# Patient Record
Sex: MALE | Race: WHITE | NOT HISPANIC OR LATINO | Employment: OTHER | URBAN - METROPOLITAN AREA
[De-identification: names, ages, dates, MRNs, and addresses within clinical notes are randomized per-mention and may not be internally consistent; named-entity substitution may affect disease eponyms.]

---

## 2022-03-21 PROBLEM — E78.1 HYPERGLYCERIDEMIA, PURE: Status: ACTIVE | Noted: 2022-03-21

## 2022-03-21 PROBLEM — R73.01 IMPAIRED FASTING GLUCOSE: Status: ACTIVE | Noted: 2022-03-21

## 2022-03-21 NOTE — PROGRESS NOTES
FAMILY MEDICINE NEW PATIENT     Date of Service: 22  Primary Care Provider:   Joseph Gannon MD     Name: Yue Villafuerte       : 1965       Age:56 y o  Sex: male      MRN: [id number]      Chief [address] and Back Pain     ASSESSMENT and PLAN:  Yue Villafuerte is a 55 y o  male here to establish care with:     Problem List Items Addressed This Visit        Endocrine    Impaired fasting glucose     Counseled patient on the importance of lifestyle interventions, specifically discussed a whole food, plant based diet low in saturated fat and processed foods  Discussed the importance of a diet that is rich in whole grains, fruits and vegetables, beans and legumes  Other    Hyperglyceridemia, pure - Primary     Last >200, has had TGs as high as high as >600  He has been on fibrate in the past, but had adverse reaction  Counseled on dietary modifications, specifically avoiding processed/refined carbohydrates, alcohol  Patient to send previously done labs for life insurance         Chronic right-sided low back pain without sciatica     Back pain MSK in nature given that symptoms worse at the end of the day after repetitive motions with job as   Would not expect kidney stone to have such insidious symptoms that come and go  No significant findings of disc herniations or radicular symptoms on exam, and no tenderness to palpation  Patient requesting CT scan to evaluate internal organs, though given nature of his back pain, and no other symptoms (no GI,  symptoms, ect) there is no indication for this type of imaging  Can obtain lumbar spine xray to assess disc space height, degenerative changes  Had extensive discussion regarding mainstay of therapy for chronic back pain, especially given poor hip and hamstring flexibility            Relevant Orders    XR spine lumbar minimum 4 views non injury      Other Visit Diagnoses     Encounter to establish care with new doctor        Class 1 obesity due to excess calories without serious comorbidity with body mass index (BMI) of 30 0 to 30 9 in adult               SUBJECTIVE:  Ana Combs is a 55 y o  male who presents today with a chief complaint of Establish Care and Back Pain  HPI     Patient presents to [facility]  He was previously seen at [address] in [facility] and previously in [facility]  He was last seen in March 2020  Overall his health is "not bad " His main complaint today is back pain  He reports he has cramping sensation, dull right sided back and flank pain  This started about a year ago as a dull pain, but the pain has progressed to having some cramping/spasms at night  He denies any injury or trauma  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  He cannot wear belts due to back pain  He has remote back injury in his 25s  He denies any lower extremity numbness, tingling, weakness in his legs  He started taking an antiinflammatory supplement  He sees a chiropractor on a regular basis  He has history of familial hyperglyceridemia, he has intolerance to fibrate  He reports it caused him to say inappropriate things  His brother is on atorvastatin  He does not eat the healthiest diet  He does eat a lot meat, red meat twice a week  He does not eat whole grains  He has one serving of fruit daily  He does want to eat healthier  He is not on any medications  He does have allergies to certain foods, has had rashes to certain chocolate, sensation of throat closing when eating certain chicken  He has to avoid brown sugar  Review of Systems   Gastrointestinal: Negative for abdominal pain, blood in stool, constipation, diarrhea and nausea  Musculoskeletal: Positive for back pain  Negative for gait problem  Neurological: Negative for weakness and numbness  Psychiatric/Behavioral: Negative for sleep disturbance       I have reviewed the patient's PMH, Surgical History, Family History, Social History, Medication List and Allergies  Histories Reviewed and Updated 3/22/2022:  Patient's Medications   New Prescriptions    No medications on file   Previous Medications    NON FORMULARY    Take 1 Dose by mouth in the morning RELIEF FACTOR PACK- ANTI-INFLAMATORY   Modified Medications    No medications on file   Discontinued Medications    No medications on file     Allergies   Allergen Reactions    Turmeric - Food Allergy Anaphylaxis    Niacin Other (See Comments)     Other reaction(s): NIACIN (NIACIN) (facial flushing)    Fenofibrate Dizziness     Other reaction(s): dizziness     Past Medical History:   Diagnosis Date    Back ache     Hemorrhoids     Hypertension     Kidney stone      Past Surgical History:   Procedure Laterality Date    NO PAST SURGERIES       Social History     Socioeconomic History    Marital status: Unknown     Spouse name: Not on file    Number of children: Not on file    Years of education: Not on file    Highest education level: Not on file   Occupational History    Not on file   Tobacco Use    Smoking status: Never Smoker    Smokeless tobacco: Never Used   Substance and Sexual Activity    Alcohol use:  Yes     Alcohol/week: 3 0 standard drinks     Types: 3 Cans of beer per week    Drug use: Never    Sexual activity: Not on file   Other Topics Concern    Not on file   Social History Narrative    Not on file     Social Determinants of Health     Financial Resource Strain: Not on file   Food Insecurity: Not on file   Transportation Needs: Not on file   Physical Activity: Not on file   Stress: Not on file   Social Connections: Not on file   Intimate Partner Violence: Not on file   Housing Stability: Not on file     Family History   Problem Relation Age of Onset    Diabetes Mother     No Known Problems Father     Diabetes Sister     Diabetic kidney disease Sister     Stroke Sister     Hyperlipidemia Brother      Immunization History   Administered Date(s) Administered    COVID-19 PFIZER VACCINE 0 3 ML IM 06/03/2021, 06/25/2021    INFLUENZA 11/01/2009, 11/11/2010    Tdap 02/04/2010     OBJECTIVE:  /68   Pulse 64   Temp (!) 96 2 °F (35 7 °C)   Resp 16   Ht 5' 7 5" (1 715 m)   Wt 89 8 kg (198 lb)   BMI 30 55 kg/m²   BP Readings from Last 3 Encounters:   03/22/22 110/68      Wt Readings from Last 3 Encounters:   03/22/22 89 8 kg (198 lb)      Physical Exam  Constitutional:       General: He is not in acute distress  Appearance: Normal appearance  He is obese  He is not ill-appearing or diaphoretic  HENT:      Head: Normocephalic and atraumatic  Right Ear: External ear normal       Left Ear: External ear normal       Nose: Nose normal       Mouth/Throat:      Mouth: Mucous membranes are moist    Eyes:      Extraocular Movements: Extraocular movements intact  Conjunctiva/sclera: Conjunctivae normal    Cardiovascular:      Rate and Rhythm: Normal rate and regular rhythm  Pulses: Normal pulses  Heart sounds: Normal heart sounds  No murmur heard  No friction rub  No gallop  Pulmonary:      Effort: Pulmonary effort is normal  No respiratory distress  Breath sounds: Normal breath sounds  No stridor  No wheezing, rhonchi or rales  Abdominal:      General: There is no distension  Palpations: Abdomen is soft  Tenderness: There is no abdominal tenderness  There is no guarding or rebound  Musculoskeletal:      Cervical back: Normal range of motion and neck supple  Lumbar back: No swelling, spasms, tenderness or bony tenderness  Normal range of motion  Negative right straight leg raise test and negative left straight leg raise test  No scoliosis  Right hip: Decreased range of motion  Left hip: Decreased range of motion  Right lower leg: No edema  Left lower leg: No edema        Comments: Decreased hip range of motion due to poor flexibility in bilateral hips, poor hamstring flexibility Skin:     General: Skin is warm and dry  Findings: No erythema or rash  Neurological:      General: No focal deficit present  Mental Status: He is alert  Gait: Gait normal    Psychiatric:         Mood and Affect: Mood normal          Behavior: Behavior normal              BMI Counseling: Body mass index is 30 55 kg/m²  The BMI is above normal  Nutrition recommendations include decreasing portion sizes, encouraging healthy choices of fruits and vegetables, consuming healthier snacks and reducing intake of saturated and trans fat  Exercise recommendations include moderate physical activity 150 minutes/week and strength training exercises  Rationale for BMI follow-up plan is due to patient being overweight or obese  Depression Screening and Follow-up Plan: Patient was screened for depression during today's encounter  They screened negative with a PHQ-2 score of 0          Abad Corea MD

## 2022-03-22 ENCOUNTER — OFFICE VISIT (OUTPATIENT)
Dept: FAMILY MEDICINE CLINIC | Facility: CLINIC | Age: 57
End: 2022-03-22
Payer: COMMERCIAL

## 2022-03-22 VITALS
WEIGHT: 198 LBS | SYSTOLIC BLOOD PRESSURE: 110 MMHG | TEMPERATURE: 96.2 F | HEIGHT: 68 IN | RESPIRATION RATE: 16 BRPM | BODY MASS INDEX: 30.01 KG/M2 | DIASTOLIC BLOOD PRESSURE: 68 MMHG | HEART RATE: 64 BPM

## 2022-03-22 DIAGNOSIS — Z76.89 ENCOUNTER TO ESTABLISH CARE WITH NEW DOCTOR: ICD-10-CM

## 2022-03-22 DIAGNOSIS — R73.01 IMPAIRED FASTING GLUCOSE: ICD-10-CM

## 2022-03-22 DIAGNOSIS — M54.50 CHRONIC RIGHT-SIDED LOW BACK PAIN WITHOUT SCIATICA: ICD-10-CM

## 2022-03-22 DIAGNOSIS — E78.1 HYPERGLYCERIDEMIA, PURE: Primary | ICD-10-CM

## 2022-03-22 DIAGNOSIS — G89.29 CHRONIC RIGHT-SIDED LOW BACK PAIN WITHOUT SCIATICA: ICD-10-CM

## 2022-03-22 DIAGNOSIS — E66.09 CLASS 1 OBESITY DUE TO EXCESS CALORIES WITHOUT SERIOUS COMORBIDITY WITH BODY MASS INDEX (BMI) OF 30.0 TO 30.9 IN ADULT: ICD-10-CM

## 2022-03-22 PROCEDURE — 3725F SCREEN DEPRESSION PERFORMED: CPT | Performed by: FAMILY MEDICINE

## 2022-03-22 PROCEDURE — 99204 OFFICE O/P NEW MOD 45 MIN: CPT | Performed by: FAMILY MEDICINE

## 2022-03-22 NOTE — PATIENT INSTRUCTIONS
Jaron Method for progressive back extension     Lower Back Exercises   AMBULATORY CARE:   Lower back exercises  help heal and strengthen your back muscles to prevent another injury  Ask your healthcare provider if you need to see a physical therapist for more advanced exercises  Seek care immediately if:   · You have severe pain that prevents you from moving  Contact your healthcare provider if:   · Your pain becomes worse  · You have new pain  · You have questions or concerns about your condition or care  Do lower back exercises safely:   · Do the exercises on a mat or firm surface  (not on a bed) to support your spine and prevent low back pain  · Move slowly and smoothly  Avoid fast or jerky motions  · Breathe normally  Do not hold your breath  · Stop if you feel pain  It is normal to feel some discomfort at first  Regular exercise will help decrease your discomfort over time  Lower back exercises: Your healthcare provider may recommend that you do back exercises 10 to 30 minutes each day  He may also recommend that you do exercises 1 to 3 times each day  Ask your healthcare provider which exercises are best for you and how often to do them  · Ankle pumps:  Lie on your back  Move your foot up (with your toes pointing toward your head)  Then, move your foot down (with your toes pointing away from you)  Repeat this exercise 10 times on each side  · Heel slides:  Lie on your back  Slowly bend one leg and then straighten it  Next, bend the other leg and then straighten it  Repeat 10 times on each side  · Pelvic tilt:  Lie on your back with your knees bent and feet flat on the floor  Place your arms in a relaxed position beside your body  Tighten the muscles of your abdomen and flatten your back against the floor  Hold for 5 seconds  Repeat 5 times  · Back stretch:  Lie on your back with your hands behind your head   Bend your knees and turn the lower half of your body to one side  Hold this position for 10 seconds  Repeat 3 times on each side  · Straight leg raises:  Lie on your back with one leg straight  Bend the other knee  Tighten your abdomen and then slowly lift the straight leg up about 6 to 12 inches off the floor  Hold for 1 to 5 seconds  Lower your leg slowly  Repeat 10 times on each leg  · Knee-to-chest:  Lie on your back with your knees bent and feet flat on the floor  Pull one of your knees toward your chest and hold it there for 5 seconds  Return your leg to the starting position  Lift the other knee toward your chest and hold for 5 seconds  Do this 5 times on each side  · Cat and camel:  Place your hands and knees on the floor  Arch your back upward toward the ceiling and lower your head  Round out your spine as much as you can  Hold for 5 seconds  Lift your head upward and push your chest downward toward the floor  Hold for 5 seconds  Do 3 sets or as directed  · Wall squats:  Stand with your back against a wall  Tighten the muscles of your abdomen  Slowly lower your body until your knees are bent at a 45 degree angle  Hold this position for 5 seconds  Slowly move back up to a standing position  Repeat 10 times  · Curl up:  Lie on your back with your knees bent and feet flat on the floor  Place your hands, palms down, underneath the curve in your lower back  Next, with your elbows on the floor, lift your shoulders and chest 2 to 3 inches  Keep your head in line with your shoulders  Hold this position for 5 seconds  When you can do this exercise without pain for 10 to 15 seconds, you may add a rotation  While your shoulders and chest are lifted off the ground, turn slightly to the left and hold  Repeat on the other side  · Bird dog:  Place your hands and knees on the floor  Keep your wrists directly below your shoulders and your knees directly below your hips  Pull your belly button in toward your spine  Do not flatten or arch your back  Tighten your abdominal muscles  Raise one arm straight out so that it is aligned with your head  Next, raise the leg opposite your arm  Hold this position for 15 seconds  Lower your arm and leg slowly and change sides  Do 5 sets  © Copyright [employer] 2022 Information is for End User's use only and may not be sold, redistributed or otherwise used for commercial purposes  All illustrations and images included in CareNotes® are the copyrighted property of [employer]  or Isaac Goodson   The above information is an  only  It is not intended as medical advice for individual conditions or treatments  Talk to your doctor, nurse or pharmacist before following any medical regimen to see if it is safe and effective for you

## 2022-03-22 NOTE — ASSESSMENT & PLAN NOTE
Last >200, has had TGs as high as high as >600  He has been on fibrate in the past, but had adverse reaction  Counseled on dietary modifications, specifically avoiding processed/refined carbohydrates, alcohol  Patient to send previously done labs for life insurance

## 2022-03-23 ENCOUNTER — TELEPHONE (OUTPATIENT)
Dept: INTERNAL MEDICINE CLINIC | Facility: CLINIC | Age: 57
End: 2022-03-23

## 2022-03-23 NOTE — ASSESSMENT & PLAN NOTE
Back pain MSK in nature given that symptoms worse at the end of the day after repetitive motions with job as   Would not expect kidney stone to have such insidious symptoms that come and go  No significant findings of disc herniations or radicular symptoms on exam, and no tenderness to palpation  Patient requesting CT scan to evaluate internal organs, though given nature of his back pain, and no other symptoms (no GI,  symptoms, ect) there is no indication for this type of imaging  Can obtain lumbar spine xray to assess disc space height, degenerative changes  Had extensive discussion regarding mainstay of therapy for chronic back pain, especially given poor hip and hamstring flexibility

## 2022-03-23 NOTE — TELEPHONE ENCOUNTER
Stacie Moon had left a voice mail to the  Office requesting a call back  Attempted to call Sj Pratt to call back

## 2022-03-24 ENCOUNTER — OFFICE VISIT (OUTPATIENT)
Dept: INTERNAL MEDICINE CLINIC | Facility: CLINIC | Age: 57
End: 2022-03-24
Payer: COMMERCIAL

## 2022-03-24 VITALS
DIASTOLIC BLOOD PRESSURE: 90 MMHG | BODY MASS INDEX: 29.98 KG/M2 | HEIGHT: 68 IN | WEIGHT: 197.8 LBS | TEMPERATURE: 97.8 F | SYSTOLIC BLOOD PRESSURE: 142 MMHG | OXYGEN SATURATION: 97 % | HEART RATE: 57 BPM

## 2022-03-24 DIAGNOSIS — R10.9 RIGHT FLANK PAIN: Primary | ICD-10-CM

## 2022-03-24 DIAGNOSIS — R79.89 ELEVATED SERUM CREATININE: ICD-10-CM

## 2022-03-24 PROBLEM — N28.1 BILATERAL RENAL CYSTS: Status: ACTIVE | Noted: 2020-02-18

## 2022-03-24 PROBLEM — K64.1 GRADE II HEMORRHOIDS: Status: ACTIVE | Noted: 2018-06-28

## 2022-03-24 PROCEDURE — 1036F TOBACCO NON-USER: CPT | Performed by: INTERNAL MEDICINE

## 2022-03-24 PROCEDURE — 3008F BODY MASS INDEX DOCD: CPT | Performed by: INTERNAL MEDICINE

## 2022-03-24 PROCEDURE — 99204 OFFICE O/P NEW MOD 45 MIN: CPT | Performed by: INTERNAL MEDICINE

## 2022-03-24 RX ORDER — MULTIVIT-MIN/IRON FUM/FOLIC AC 7.5 MG-4
1 TABLET ORAL DAILY
COMMUNITY

## 2022-03-24 NOTE — ASSESSMENT & PLAN NOTE
Questionable history of bilateral kidney cysts, he did have kidney stones in the past   No family history of kidney disease other than complications from longstanding diabetes  Last blood work in the system from 2 years ago shows elevated creatinine and low GFR of 58  Ordered CT scan of the abdomen wo and UA  He had blood work done 1 month ago, but does not have copies with him, we will review and re-evaluate the need for further tests

## 2022-03-24 NOTE — ASSESSMENT & PLAN NOTE
Blood pressure in office today was 142/90  And prior office visit 2 days ago his blood pressure was 110/80  We will monitor

## 2022-03-24 NOTE — PROGRESS NOTES
INTERNAL MEDICINE INITIAL OFFICE VISIT  [facility] INTERNAL MEDICINE NURYS    NAME: Pablo Person  AGE: 55 y o  SEX: male  : 1965     DATE: 3/24/2022     Assessment and Plan:     Right flank pain  Questionable history of bilateral kidney cysts, he did have kidney stones in the past   No family history of kidney disease other than complications from longstanding diabetes  Last blood work in the system from 2 years ago shows elevated creatinine and low GFR of 58  Ordered CT scan of the abdomen wo and UA  He had blood work done 1 month ago, but does not have copies with him, we will review and re-evaluate the need for further tests  Bilateral renal cysts  Ordered CT scan for further evaluation  Elevated serum creatinine  Patient will bring copies of his most recent blood work, oriented to avoid NSAIDs for now  Elevated blood-pressure reading without diagnosis of hypertension  Blood pressure in office today was 142/90  And prior office visit 2 days ago his blood pressure was 110/80  We will monitor  Return in about 3 months (around 2022) for Recheck  Chief Complaint:     Chief Complaint   Patient presents with    Follow-up     establish care, ED, lower right back pain      History of Present Illness:     Patient is a 50-year-old male with past medical history of hypertriglyceridemia, elevated creatinine, questionable bilateral kidney cysts, history of kidney stones, who presents in office to establish care with us as a primary care physician  Patient was recently seen by family med, but states that his issue was not addressed as he expected  Patient states that he has been dealing with right flank pain for about 1 year  The pain has been progressive bothersome and worsening, the pain is exacerbated by certain movements specially when he sits down  The pain is alleviated by him lying down on his left side    He also takes Advil as needed for the pain  The pain is mild most of the time, with episodes of severe cramp pain  The pain does not radiate, is not associated with nausea, vomiting, urinary or bowel changes  Patient states that he had back problem in the past, and he does not feel like the pain comes from a back issue  Review of his prior consultations and blood work revealed elevated creatinine of 1 38, with a GFR of 58 and 2020, he also had elevated fasting glucose of 110, and elevated triglycerides of 244  Patient was seen by internal medicine in the past, questionable bilateral kidney cysts, he was referred by nephrologist, he does not recall seeing a specialist for his or being told of this diagnosis  Patient states that he have a colonoscopy about 5 years ago, he has a history of colon polyps and hemorrhoids which were taking care at that time  Patient have blood work done about 1 month ago, but could not bring the copies for review today  The following portions of the patient's history were reviewed and updated as appropriate: allergies, current medications, past family history, past medical history, past social history, past surgical history and problem list      Review of Systems:     Review of Systems   Constitutional: Negative for appetite change, fatigue and fever  HENT: Negative for congestion, sore throat and trouble swallowing  Eyes: Negative for visual disturbance  Respiratory: Negative for cough, chest tightness, shortness of breath and wheezing  Cardiovascular: Negative for chest pain, palpitations and leg swelling  Gastrointestinal: Positive for abdominal pain (right flank)  Negative for constipation, nausea and vomiting  Genitourinary: Negative for difficulty urinating, frequency, hematuria and urgency  Musculoskeletal: Positive for back pain  Negative for arthralgias and joint swelling  Skin: Negative for rash  Neurological: Negative for dizziness and headaches     Psychiatric/Behavioral: Negative for confusion and sleep disturbance  Past Medical History:     Past Medical History:   Diagnosis Date    Back ache     Hemorrhoids     Hypertension     Kidney stone     Kidney stones         Past Surgical History:     Past Surgical History:   Procedure Laterality Date    NO PAST SURGERIES          Social History:   He reports that he has never smoked  He has never used smokeless tobacco  He reports current alcohol use of about 3 0 standard drinks of alcohol per week  He reports that he does not use drugs  Family History:     Family History   Problem Relation Age of Onset    Diabetes Mother     No Known Problems Father     Diabetes Sister     Diabetic kidney disease Sister     Stroke Sister     Hyperlipidemia Brother         Current Medications:     Current Outpatient Medications:     Multiple Vitamins-Minerals (multivitamin with minerals) tablet, Take 1 tablet by mouth daily, Disp: , Rfl:     NON FORMULARY, Take 1 Dose by mouth in the morning RELIEF FACTOR PACK- ANTI-INFLAMATORY, Disp: , Rfl:      Allergies: Allergies   Allergen Reactions    Turmeric - Food Allergy Anaphylaxis    Niacin Other (See Comments)     Other reaction(s): NIACIN (NIACIN) (facial flushing)    Fenofibrate Dizziness     Other reaction(s): dizziness        Physical Exam:     /90 (BP Location: Left arm, Patient Position: Sitting, Cuff Size: Standard)   Pulse 57   Temp 97 8 °F (36 6 °C) (Tympanic)   Ht 5' 7 5" (1 715 m)   Wt 89 7 kg (197 lb 12 8 oz)   SpO2 97%   BMI 30 52 kg/m²     Physical Exam  Vitals and nursing note reviewed  Constitutional:       General: He is not in acute distress  Appearance: He is well-developed  He is obese  HENT:      Head: Normocephalic and atraumatic  Eyes:      Conjunctiva/sclera: Conjunctivae normal       Pupils: Pupils are equal, round, and reactive to light  Neck:      Thyroid: No thyromegaly     Cardiovascular:      Rate and Rhythm: Normal rate and regular rhythm  Heart sounds: Normal heart sounds  Pulmonary:      Effort: No respiratory distress  Breath sounds: Normal breath sounds  No wheezing  Abdominal:      General: Bowel sounds are normal  There is no distension  Palpations: Abdomen is soft  There is no mass  Tenderness: There is no abdominal tenderness  There is no right CVA tenderness  Musculoskeletal:         General: No swelling  Normal range of motion  Cervical back: Normal range of motion and neck supple  Skin:     General: Skin is warm and dry  Capillary Refill: Capillary refill takes less than 2 seconds  Coloration: Skin is not pale  Findings: No erythema  Neurological:      Mental Status: He is alert and oriented to person, place, and time  Sensory: No sensory deficit  Motor: No weakness or abnormal muscle tone  Psychiatric:         Thought Content: Thought content normal          Judgment: Judgment normal           Data:     Laboratory Results: I have personally reviewed the pertinent laboratory results/reports   Radiology/Other Diagnostic Testing Results: I have personally reviewed pertinent reports        Sourav Anderson MD  [facility] INTERNAL MEDICINE [place]

## 2022-04-22 ENCOUNTER — HOSPITAL ENCOUNTER (OUTPATIENT)
Dept: CT IMAGING | Facility: HOSPITAL | Age: 57
Discharge: HOME | End: 2022-04-22
Attending: INTERNAL MEDICINE
Payer: COMMERCIAL

## 2022-04-22 DIAGNOSIS — R10.9 RIGHT FLANK PAIN: ICD-10-CM

## 2022-04-22 PROCEDURE — 74176 CT ABD & PELVIS W/O CONTRAST: CPT

## 2022-04-22 PROCEDURE — G1004 CDSM NDSC: HCPCS

## 2022-05-02 DIAGNOSIS — R73.01 IMPAIRED FASTING GLUCOSE: Primary | ICD-10-CM

## 2022-05-02 DIAGNOSIS — E78.1 HYPERGLYCERIDEMIA, PURE: ICD-10-CM

## 2022-05-02 DIAGNOSIS — R79.89 ELEVATED SERUM CREATININE: ICD-10-CM

## 2022-05-02 NOTE — PROGRESS NOTES
Called patient to inform results of CT scan, which showed bilateral kidney cysts, simple and right-sided 3 mm kidney stone  Instructed patient to increase oral intake  He was not able to have access to his most recent blood work, will repeat

## 2022-05-24 ENCOUNTER — APPOINTMENT (OUTPATIENT)
Dept: LAB | Facility: CLINIC | Age: 57
End: 2022-05-24
Payer: COMMERCIAL

## 2022-05-24 DIAGNOSIS — R79.89 ELEVATED SERUM CREATININE: ICD-10-CM

## 2022-05-24 DIAGNOSIS — R73.01 IMPAIRED FASTING GLUCOSE: ICD-10-CM

## 2022-05-24 DIAGNOSIS — E78.1 HYPERGLYCERIDEMIA, PURE: ICD-10-CM

## 2022-05-24 LAB
ALBUMIN SERPL BCP-MCNC: 3.9 G/DL (ref 3.5–5)
ALP SERPL-CCNC: 102 U/L (ref 46–116)
ALT SERPL W P-5'-P-CCNC: 41 U/L (ref 12–78)
ANION GAP SERPL CALCULATED.3IONS-SCNC: 2 MMOL/L (ref 4–13)
AST SERPL W P-5'-P-CCNC: 27 U/L (ref 5–45)
BACTERIA UR QL AUTO: NORMAL /HPF
BASOPHILS # BLD AUTO: 0.05 THOUSANDS/ΜL (ref 0–0.1)
BASOPHILS NFR BLD AUTO: 1 % (ref 0–1)
BILIRUB SERPL-MCNC: 1.26 MG/DL (ref 0.2–1)
BILIRUB UR QL STRIP: NEGATIVE
BUN SERPL-MCNC: 19 MG/DL (ref 5–25)
CALCIUM SERPL-MCNC: 9.4 MG/DL (ref 8.3–10.1)
CHLORIDE SERPL-SCNC: 108 MMOL/L (ref 100–108)
CHOLEST SERPL-MCNC: 176 MG/DL
CLARITY UR: CLEAR
CO2 SERPL-SCNC: 31 MMOL/L (ref 21–32)
COLOR UR: NORMAL
CREAT SERPL-MCNC: 1.43 MG/DL (ref 0.6–1.3)
EOSINOPHIL # BLD AUTO: 0.13 THOUSAND/ΜL (ref 0–0.61)
EOSINOPHIL NFR BLD AUTO: 2 % (ref 0–6)
ERYTHROCYTE [DISTWIDTH] IN BLOOD BY AUTOMATED COUNT: 12.1 % (ref 11.6–15.1)
EST. AVERAGE GLUCOSE BLD GHB EST-MCNC: 114 MG/DL
GFR SERPL CREATININE-BSD FRML MDRD: 54 ML/MIN/1.73SQ M
GLUCOSE SERPL-MCNC: 97 MG/DL (ref 65–140)
GLUCOSE UR STRIP-MCNC: NEGATIVE MG/DL
HBA1C MFR BLD: 5.6 %
HCT VFR BLD AUTO: 47 % (ref 36.5–49.3)
HDLC SERPL-MCNC: 33 MG/DL
HGB BLD-MCNC: 16.4 G/DL (ref 12–17)
HGB UR QL STRIP.AUTO: NEGATIVE
IMM GRANULOCYTES # BLD AUTO: 0.01 THOUSAND/UL (ref 0–0.2)
IMM GRANULOCYTES NFR BLD AUTO: 0 % (ref 0–2)
KETONES UR STRIP-MCNC: NEGATIVE MG/DL
LDLC SERPL CALC-MCNC: 88 MG/DL (ref 0–100)
LEUKOCYTE ESTERASE UR QL STRIP: NEGATIVE
LYMPHOCYTES # BLD AUTO: 1.6 THOUSANDS/ΜL (ref 0.6–4.47)
LYMPHOCYTES NFR BLD AUTO: 28 % (ref 14–44)
MCH RBC QN AUTO: 30.9 PG (ref 26.8–34.3)
MCHC RBC AUTO-ENTMCNC: 34.9 G/DL (ref 31.4–37.4)
MCV RBC AUTO: 89 FL (ref 82–98)
MONOCYTES # BLD AUTO: 0.49 THOUSAND/ΜL (ref 0.17–1.22)
MONOCYTES NFR BLD AUTO: 9 % (ref 4–12)
NEUTROPHILS # BLD AUTO: 3.39 THOUSANDS/ΜL (ref 1.85–7.62)
NEUTS SEG NFR BLD AUTO: 60 % (ref 43–75)
NITRITE UR QL STRIP: NEGATIVE
NON-SQ EPI CELLS URNS QL MICRO: NORMAL /HPF
NRBC BLD AUTO-RTO: 0 /100 WBCS
PH UR STRIP.AUTO: 5.5 [PH]
PLATELET # BLD AUTO: 179 THOUSANDS/UL (ref 149–390)
PMV BLD AUTO: 11.6 FL (ref 8.9–12.7)
POTASSIUM SERPL-SCNC: 4.5 MMOL/L (ref 3.5–5.3)
PROT SERPL-MCNC: 7.2 G/DL (ref 6.4–8.2)
PROT UR STRIP-MCNC: NEGATIVE MG/DL
RBC # BLD AUTO: 5.3 MILLION/UL (ref 3.88–5.62)
RBC #/AREA URNS AUTO: NORMAL /HPF
SODIUM SERPL-SCNC: 141 MMOL/L (ref 136–145)
SP GR UR STRIP.AUTO: 1.02 (ref 1–1.03)
TRIGL SERPL-MCNC: 273 MG/DL
UROBILINOGEN UR STRIP-ACNC: <2 MG/DL
WBC # BLD AUTO: 5.67 THOUSAND/UL (ref 4.31–10.16)
WBC #/AREA URNS AUTO: NORMAL /HPF

## 2022-05-24 PROCEDURE — 80061 LIPID PANEL: CPT

## 2022-05-24 PROCEDURE — 83036 HEMOGLOBIN GLYCOSYLATED A1C: CPT

## 2022-05-24 PROCEDURE — 81001 URINALYSIS AUTO W/SCOPE: CPT | Performed by: INTERNAL MEDICINE

## 2022-05-24 PROCEDURE — 80053 COMPREHEN METABOLIC PANEL: CPT

## 2022-05-24 PROCEDURE — 36415 COLL VENOUS BLD VENIPUNCTURE: CPT

## 2022-05-24 PROCEDURE — 85025 COMPLETE CBC W/AUTO DIFF WBC: CPT

## 2022-05-25 ENCOUNTER — RA CDI HCC (OUTPATIENT)
Dept: OTHER | Facility: HOSPITAL | Age: 57
End: 2022-05-25

## 2022-05-25 NOTE — PROGRESS NOTES
[facility]  coding opportunities       Chart reviewed, no opportunity found: CHART REVIEWED, NO OPPORTUNITY FOUND        Patients Insurance        Commercial Insurance: [employer]

## 2022-06-01 ENCOUNTER — OFFICE VISIT (OUTPATIENT)
Dept: INTERNAL MEDICINE CLINIC | Facility: CLINIC | Age: 57
End: 2022-06-01
Payer: COMMERCIAL

## 2022-06-01 VITALS
HEIGHT: 68 IN | OXYGEN SATURATION: 96 % | TEMPERATURE: 98.1 F | BODY MASS INDEX: 29.98 KG/M2 | WEIGHT: 197.8 LBS | HEART RATE: 55 BPM | SYSTOLIC BLOOD PRESSURE: 138 MMHG | DIASTOLIC BLOOD PRESSURE: 80 MMHG

## 2022-06-01 DIAGNOSIS — N20.0 KIDNEY STONE: ICD-10-CM

## 2022-06-01 DIAGNOSIS — E78.1 HYPERTRIGLYCERIDEMIA: ICD-10-CM

## 2022-06-01 DIAGNOSIS — R17 ELEVATED BILIRUBIN: ICD-10-CM

## 2022-06-01 DIAGNOSIS — R79.89 ELEVATED SERUM CREATININE: ICD-10-CM

## 2022-06-01 DIAGNOSIS — N28.1 BILATERAL RENAL CYSTS: ICD-10-CM

## 2022-06-01 DIAGNOSIS — Z00.00 ANNUAL PHYSICAL EXAM: Primary | ICD-10-CM

## 2022-06-01 DIAGNOSIS — Z12.5 SCREENING FOR PROSTATE CANCER: ICD-10-CM

## 2022-06-01 DIAGNOSIS — E78.2 MIXED HYPERLIPIDEMIA: ICD-10-CM

## 2022-06-01 PROCEDURE — 3008F BODY MASS INDEX DOCD: CPT | Performed by: INTERNAL MEDICINE

## 2022-06-01 PROCEDURE — 1036F TOBACCO NON-USER: CPT | Performed by: INTERNAL MEDICINE

## 2022-06-01 PROCEDURE — 99396 PREV VISIT EST AGE 40-64: CPT | Performed by: INTERNAL MEDICINE

## 2022-06-01 RX ORDER — ICOSAPENT ETHYL 1000 MG/1
1 CAPSULE ORAL 2 TIMES DAILY
Qty: 60 CAPSULE | Refills: 2 | Status: SHIPPED | OUTPATIENT
Start: 2022-06-01

## 2022-06-01 NOTE — ASSESSMENT & PLAN NOTE
As seen on CT, 3 mm  Hx of stones in the past, no stone analysis   Recommended increase oral hydration

## 2022-06-01 NOTE — PATIENT INSTRUCTIONS

## 2022-06-01 NOTE — PROGRESS NOTES
ADULT ANNUAL PHYSICAL  [facility] INTERNAL MEDICINE NURYS    NAME: Reed Rodriges  AGE: 55 y o  SEX: male  : 1965     DATE: 2022     Assessment and Plan:     Problem List Items Addressed This Visit        Genitourinary    Bilateral renal cysts     As seen on recent ct scan, minimal increase in the right side  Kidney stone     As seen on CT, 3 mm  Hx of stones in the past, no stone analysis  Recommended increase oral hydration                                  Elevated serum creatinine     Persistent elevated Cr  Occasional use of NSAIDs  Refer to Nephrology  Repeat Cr/BUN after good hydration and no use of NSAID prior  Relevant Orders    BUN    Creatinine, serum    Ambulatory Referral to Nephrology    Elevated bilirubin     Check direct bilirubin  No hx of transient jaundice in the past             Relevant Orders    Bilirubin, total    Bilirubin, direct    Mixed hyperlipidemia     Hx of high TG in the past, has positive family hx  Also low HDL  Tried Tricor and niacin, didn't tolerated  Will start Vascepa, repeat lipid panel in 3 months           Relevant Medications    Icosapent Ethyl (Vascepa) 1 g CAPS      Other Visit Diagnoses     Annual physical exam    -  Primary    Screening for prostate cancer        Relevant Orders    PSA, Total Screen          Immunizations and preventive care screenings were discussed with patient today  Appropriate education was printed on patient's after visit summary  Counseling:  Alcohol/drug use: discussed moderation in alcohol intake, the recommendations for healthy alcohol use, and avoidance of illicit drug use  Dental Health: discussed importance of regular tooth brushing, flossing, and dental visits  Injury prevention: discussed safety/seat belts, safety helmets, smoke detectors, carbon dioxide detectors, and smoking near bedding or upholstery    Sexual health: discussed sexually transmitted diseases, partner selection, use of condoms, avoidance of unintended pregnancy, and contraceptive alternatives  · Exercise: the importance of regular exercise/physical activity was discussed  Recommend exercise 3-5 times per week for at least 30 minutes  ·   Patient states that he had a colonoscopy done in the past 6 years  We do not have access to the records  Recommended to follow up with GI  Return in 3 months (on 9/1/2022)  Chief Complaint:     Chief Complaint   Patient presents with    Annual Exam     3 month f/u      History of Present Illness:     Adult Annual Physical   Patient here for a comprehensive physical exam  The patient reports no problems  His back/flank pain has improved with avoidance of activities that exacerbate it  Discussed results of recent blood work and CT abdomen  CT scan of abdomen wo : 04/22/2022  KIDNEYS/URETERS:  3 mm right renal interpolar calculus  No significant interval change  No hydronephrosis or hydroureter  5 cm right renal exophytic simple cyst previously 3 6 cm; minimal enlargement over this period of time is not an unexpected finding  New 1 8 cm left renal lower pole parapelvic simple cyst  No perinephric collection      STOMACH AND BOWEL:  Few scattered colonic diverticula without diverticulitis  OSSEOUS STRUCTURES:  No acute fracture or osseous destructive lesion identified  Mild degenerative changes of the spine, pubic symphysis, and multiple joints  Chronic bilateral pars defect at L5  Minimal grade 1 anterolisthesis of L5 on S1  Multilevel   thoracolumbar spondylosis and paravertebral ossification suggestive of diffuse idiopathic skeletal hyperostosis      IMPRESSION:     Stable 3 mm right renal nonobstructing calculus      Bilateral renal simple cysts, the larger of which measures 5 cm on the right      Colonic diverticulosis  Results of the blood work that were abnormal discussed with patient        Diet and Physical Activity  · Diet/Nutrition: limited junk food and limited fruits/vegetables  · Exercise: no formal exercise  Depression Screening  PHQ-2/9 Depression Screening         General Health  · Sleep: sleeps well and gets 7-8 hours of sleep on average  · Hearing: normal - bilateral   · Vision: wears glasses  · Dental: regular dental visits   Health  · Symptoms include: none     Review of Systems:     Review of Systems   Constitutional: Negative for appetite change and fatigue  HENT: Negative for congestion, hearing loss, sore throat and tinnitus  Eyes: Negative for visual disturbance  Respiratory: Negative for cough, chest tightness, shortness of breath and wheezing  Cardiovascular: Negative for chest pain, palpitations and leg swelling  Gastrointestinal: Negative for abdominal pain, nausea and vomiting  Genitourinary: Negative for difficulty urinating, frequency and urgency  Musculoskeletal: Positive for back pain  Negative for arthralgias and joint swelling  Skin: Negative for rash  Neurological: Negative for dizziness and headaches  Psychiatric/Behavioral: Negative for confusion and sleep disturbance  The patient is not nervous/anxious         Past Medical History:     Past Medical History:   Diagnosis Date    Back ache     Hemorrhoids     Hypertension     Kidney stone     Kidney stones       Past Surgical History:     Past Surgical History:   Procedure Laterality Date    NO PAST SURGERIES        Family History:     Family History   Problem Relation Age of Onset    Diabetes Mother     No Known Problems Father     Diabetes Sister     Diabetic kidney disease Sister     Stroke Sister     Hyperlipidemia Brother       Social History:     Social History     Socioeconomic History    Marital status: /Civil Union     Spouse name: None    Number of children: None    Years of education: None    Highest education level: None   Occupational History    None   Tobacco Use  Smoking status: Never Smoker    Smokeless tobacco: Never Used   Vaping Use    Vaping Use: Never used   Substance and Sexual Activity    Alcohol use: Yes     Alcohol/week: 3 0 standard drinks     Types: 3 Cans of beer per week     Comment: 2-3 beers a week    Drug use: Never    Sexual activity: None   Other Topics Concern    None   Social History Narrative    None     Social Determinants of Health     Financial Resource Strain: Not on file   Food Insecurity: Not on file   Transportation Needs: Not on file   Physical Activity: Not on file   Stress: Not on file   Social Connections: Not on file   Intimate Partner Violence: Not on file   Housing Stability: Not on file      Current Medications:     Current Outpatient Medications   Medication Sig Dispense Refill    Icosapent Ethyl (Vascepa) 1 g CAPS Take 1 capsule (1 g total) by mouth 2 (two) times a day 60 capsule 2    Multiple Vitamins-Minerals (multivitamin with minerals) tablet Take 1 tablet by mouth daily      NON FORMULARY Take 1 Dose by mouth in the morning RELIEF FACTOR PACK- ANTI-INFLAMATORY       No current facility-administered medications for this visit  Allergies: Allergies   Allergen Reactions    Turmeric - Food Allergy Anaphylaxis    Niacin Other (See Comments)     Other reaction(s): NIACIN (NIACIN) (facial flushing)    Fenofibrate Dizziness     Other reaction(s): dizziness      Physical Exam:     /80 (BP Location: Left arm, Patient Position: Sitting, Cuff Size: Standard)   Pulse 55   Temp 98 1 °F (36 7 °C) (Tympanic)   Ht 5' 7 5" (1 715 m)   Wt 89 7 kg (197 lb 12 8 oz)   SpO2 96%   BMI 30 52 kg/m²     Physical Exam  Vitals and nursing note reviewed  Constitutional:       General: He is not in acute distress  Appearance: Normal appearance  He is obese  HENT:      Head: Normocephalic and atraumatic        Right Ear: Tympanic membrane and ear canal normal       Left Ear: Tympanic membrane and ear canal normal  Eyes:      Extraocular Movements: Extraocular movements intact  Pupils: Pupils are equal, round, and reactive to light  Cardiovascular:      Rate and Rhythm: Regular rhythm  Pulses: Normal pulses  Heart sounds: No murmur heard  Pulmonary:      Effort: Pulmonary effort is normal       Breath sounds: Normal breath sounds  No wheezing or rhonchi  Abdominal:      General: Abdomen is flat  Bowel sounds are normal       Palpations: Abdomen is soft  Tenderness: There is no abdominal tenderness  Musculoskeletal:         General: No swelling  Normal range of motion  Cervical back: Normal range of motion and neck supple  Skin:     General: Skin is warm and dry  Capillary Refill: Capillary refill takes less than 2 seconds  Neurological:      General: No focal deficit present  Mental Status: He is alert and oriented to person, place, and time  Sensory: No sensory deficit  Motor: No weakness  Psychiatric:         Mood and Affect: Mood normal          Behavior: Behavior normal          Thought Content:  Thought content normal           Ce Torrez MD  [facility] INTERNAL MEDICINE [place]

## 2022-06-01 NOTE — ASSESSMENT & PLAN NOTE
Tried Tricor and Niacin in the past, did not tolerated  , he also has low HDL, indication pattern of metabolic syndrome  Will start Vescepa and recheck lipid panel in 3 months

## 2022-06-01 NOTE — ASSESSMENT & PLAN NOTE
Persistent elevated Cr  Occasional use of NSAIDs  Refer to Nephrology  Repeat Cr/BUN after good hydration and no use of NSAID prior

## 2022-06-02 ENCOUNTER — TELEPHONE (OUTPATIENT)
Dept: NEPHROLOGY | Facility: CLINIC | Age: 57
End: 2022-06-02

## 2022-06-02 PROBLEM — E78.2 MIXED HYPERLIPIDEMIA: Status: ACTIVE | Noted: 2022-06-02

## 2022-06-02 NOTE — ASSESSMENT & PLAN NOTE
Hx of high TG in the past, has positive family hx  Also low HDL  Tried Tricor and niacin, didn't tolerated    Will start Vascepa, repeat lipid panel in 3 months

## 2022-06-06 NOTE — TELEPHONE ENCOUNTER
New Patient Intake Form   Patient Details   Reed Rodriges     1965     [id number]     Appointment Information   Who is calling to schedule? If not patient, what is callers name? [address]    Referring Provider  Dr Carlos Pritchett   Referring Provider Number [phone]   Reason for Appt (Diagnosis) Elevated serum creatinine    Is patient aware of why they are being referred? yes   Does Patient have labs done at [facility]? If not, where do they go? yes    Has patient had labs / urine work done? List date of most recent lab / urine work yes    Has patient had a BMP & CBC done in the past 2 years? If so, list the date yes    Has patient been hospitalized recently? If yes, list name and location of hospital they were in no    Has patient been seen by a Nephrologist before? If yes, list name, location and phone number  no   Has patient been see by another Specialty before (ex  Neurology, urology, cardiology)? If yes, please list name, and specialty no   Has the patient had imaging done? If so, list the most recent date and type of imaging yes  - CT   Does the patient has a stone analysis report if history of kidney stones? no   Appointment Details   Is there a referral on file?  yes    Appointment Date  8/23   Location [place]    Miscellaneous

## 2022-06-09 ENCOUNTER — TELEPHONE (OUTPATIENT)
Dept: INTERNAL MEDICINE CLINIC | Facility: CLINIC | Age: 57
End: 2022-06-09

## 2022-06-09 NOTE — TELEPHONE ENCOUNTER
6/9/22 - Pt called and said his pharmacy advised him they couldn't fill Dr Hannah You prescription for Vascepa because the ins co is denying it and they don't see where a prior authn for this medication was done  Pt wants a status of this medication  Icosapent Ethyl (Vascepa) 1 g CAPS 60 capsule 2 6/1/2022     Sig - Route:  Take 1 capsule (1 g total) by mouth 2 (two) times a day - Oral    Sent to pharmacy as: Icosapent Ethyl (VASCEPA) 1 g CAPS    E-Prescribing Status: Receipt confirmed by pharmacy (6/1/2022  3:51 PM EDT)

## 2022-06-09 NOTE — TELEPHONE ENCOUNTER
Rosina Cope can you please check if needs pre auth? He failed niacin and fenofibrate in the past  Thank you!

## 2022-06-13 NOTE — TELEPHONE ENCOUNTER
Prior Authorization for [employer] has been completed  Waiting for approval from insurance company

## 2022-08-23 ENCOUNTER — TELEPHONE (OUTPATIENT)
Dept: NEPHROLOGY | Facility: CLINIC | Age: 57
End: 2022-08-23

## 2022-08-23 ENCOUNTER — CONSULT (OUTPATIENT)
Dept: NEPHROLOGY | Facility: CLINIC | Age: 57
End: 2022-08-23
Payer: COMMERCIAL

## 2022-08-23 VITALS
DIASTOLIC BLOOD PRESSURE: 88 MMHG | HEIGHT: 68 IN | WEIGHT: 196 LBS | SYSTOLIC BLOOD PRESSURE: 142 MMHG | BODY MASS INDEX: 29.7 KG/M2

## 2022-08-23 DIAGNOSIS — N18.31 STAGE 3A CHRONIC KIDNEY DISEASE (HCC): ICD-10-CM

## 2022-08-23 DIAGNOSIS — R79.89 ELEVATED SERUM CREATININE: ICD-10-CM

## 2022-08-23 DIAGNOSIS — M89.9 CHRONIC KIDNEY DISEASE-MINERAL AND BONE DISORDER: Primary | ICD-10-CM

## 2022-08-23 DIAGNOSIS — E83.9 CHRONIC KIDNEY DISEASE-MINERAL AND BONE DISORDER: Primary | ICD-10-CM

## 2022-08-23 DIAGNOSIS — N18.9 CHRONIC KIDNEY DISEASE-MINERAL AND BONE DISORDER: Primary | ICD-10-CM

## 2022-08-23 PROCEDURE — 99204 OFFICE O/P NEW MOD 45 MIN: CPT | Performed by: INTERNAL MEDICINE

## 2022-08-23 NOTE — LETTER
August 23, 2022     Tana Price MD   [place] [address]    Patient: Aguilar Plunkett   YOB: 1965   Date of Visit: 8/23/2022       Dear Dr Edson Moreno: Thank you for referring Chai President to me for evaluation  Below are my notes for this consultation  If you have questions, please do not hesitate to call me  I look forward to following your patient along with you  Sincerely,        Halle Wang MD        CC: No Recipients  Halle Wang MD  8/23/2022  4:04 PM  Sign when Signing Visit  [address] 65 y o  male MRN: [id number]  Date: 08/23/22  Reason for consultation: Initial evaluation of elevated creatinine  ASSESSMENT and PLAN:  1  Chronic kidney disease, stage IIIA:  · Adan's baseline creatinine is 1 3 to 1 4 since 2012  · The etiology of his renal disease is not clear  His urinalysis is bland and his CT abd/pelvis did not show any hydronephrosis  · He does not have hypertension or diabetes  His hematologic parameters are not indicative of conditions that can predispose him to CKD  · Fortunately, he has no proteinuria which bodes for a good renal prognosis  · I recommend no changes at this time and would simply monitor going forward  · Would simply check a UPC ratio to quantify proteinuria (if any)  2  Mineral and bone disease:  · Check PTH, phos, and Vitamin D      3  Elevated blood pressure:  · Check BP twice daily at home for 1 week and bring BP readings in with next visit  · No medications recommended at this time  Patient Instructions   You have stage III kidney disease  However, it has been stable for about a decade now  Your urinalysis indicates that you are likely to stay stable  Please try to avoid NSAIDs  Please check your BP at home twice a day for 1 week prior to your next appt  Follow up in 6 months - check labs 1-2 weeks before the appt       HISTORY OF PRESENT ILLNESS:  Requesting Physician: Darron Ramos MD    Tarah Luevano is a 65 y o  male who was referred for initial evaluation of an elevated creatinine  Reggie Kulkarni has a history of hyperlipidemia, nephrolithiasis, and degenerative disc disease  He is unaware of a history of hypertension, diabetes mellitus, cardiac , pulmonary, or vascular disease  He is also unaware of a history of chronic kidney disease  However, upon my review of the record, I note that his creatinine has been in the range of 1 29-1 68 based on readings between 2012 and 2022  For the most part, his creatinine ranges from 1 3 to 1 4  He had routine blood work in May 2022 and was found have a creatinine of 1 43 prompting renal consultation  His last passage of kidney stones was years ago  He denies any history of gross hematuria  He takes Aleve once every 2 months  He is currently not on any maintenance medications  PAST MEDICAL HISTORY:  1  HLP: currently not on meds  2  GERD: diet controlled  3  Nephrolithiasis  4  DDD    No known history of HTN, DM, HLP, CAD, CHF, CVA, PAD, COPD, VLADISLAV, asthma, thromboembolism, liver disease, BPH, malignancy, psychiatric disease  PAST SURGICAL HISTORY:  1  None  ALLERGIES:  Allergies   Allergen Reactions    Turmeric - Food Allergy Anaphylaxis    Niacin Other (See Comments)     Other reaction(s): NIACIN (NIACIN) (facial flushing)    Fenofibrate Dizziness     Other reaction(s): dizziness     SOCIAL HISTORY:  · Non smoker  · He drinks 2-3 beers a week when he is out  · No IVDA  FAMILY HISTORY:  · Mother with DM and ESRD on HD  · Sister with DM and ESRD on HD       MEDICATIONS:    Current Outpatient Medications:     Icosapent Ethyl (Vascepa) 1 g CAPS, Take 1 capsule (1 g total) by mouth 2 (two) times a day (Patient not taking: Reported on 8/23/2022), Disp: 60 capsule, Rfl: 2    Multiple Vitamins-Minerals (multivitamin with minerals) tablet, Take 1 tablet by mouth daily (Patient not taking: Reported on 8/23/2022), Disp: , Rfl:     NON FORMULARY, Take 1 Dose by mouth in the morning RELIEF FACTOR PACK- ANTI-INFLAMATORY (Patient not taking: Reported on 8/23/2022), Disp: , Rfl:     REVIEW OF SYSTEMS:  Review of Systems   Constitutional: Positive for fatigue  Negative for appetite change, chills and fever  HENT: Positive for hearing loss  Negative for postnasal drip, rhinorrhea, sinus pressure and sinus pain  Eyes: Negative for visual disturbance  Respiratory: Negative for cough and shortness of breath  Cardiovascular: Negative for chest pain and leg swelling  Gastrointestinal: Negative for abdominal pain, diarrhea, nausea and vomiting  Genitourinary: Negative for difficulty urinating, dysuria and hematuria  Musculoskeletal: Positive for back pain  Negative for arthralgias  Skin: Negative for rash  Allergic/Immunologic: Negative for immunocompromised state  Neurological: Negative for dizziness and light-headedness  Hematological: Does not bruise/bleed easily  Psychiatric/Behavioral: Negative for dysphoric mood and self-injury  All the systems were reviewed and were negative except as documented on the HPI  PHYSICAL EXAMINATION:  /88   Ht 5' 7 5" (1 715 m)   Wt 88 9 kg (196 lb)   BMI 30 24 kg/m²   Current Weight: Weight - Scale: 88 9 kg (196 lb) Body mass index is 30 24 kg/m²  General: conscious, coherent, cooperative, not in distress  Skin: warm, dry, good turgor  Eyes: pink conjunctivae, no scleral icterus  ENT: moist lips and mucous membranes  Respiratory: equal chest expansion, clear breath sounds  Cardiovascular: distinct heart sounds, normal rate, regular rhythm, no rub  Abdomen: soft, non-tender, non-distended, normoactive bowel sounds  Extremities: no edema  Genitourinary: no falk catheter  Neuro: awake, alert, oriented to time, place and person  Psych: appropriate affect       LABORATORY RESULTS:  Results for orders placed or performed in visit on 22   CBC and differential   Result Value Ref Range    WBC 5 67 4 31 - 10 16 Thousand/uL    RBC 5 30 3 88 - 5 62 Million/uL    Hemoglobin 16 4 12 0 - 17 0 g/dL    Hematocrit 47 0 36 5 - 49 3 %    MCV 89 82 - 98 fL    MCH 30 9 26 8 - 34 3 pg    MCHC 34 9 31 4 - 37 4 g/dL    RDW 12 1 11 6 - 15 1 %    MPV 11 6 8 9 - 12 7 fL    Platelets 825 539 - 796 Thousands/uL    nRBC 0 /100 WBCs    Neutrophils Relative 60 43 - 75 %    Immat GRANS % 0 0 - 2 %    Lymphocytes Relative 28 14 - 44 %    Monocytes Relative 9 4 - 12 %    Eosinophils Relative 2 0 - 6 %    Basophils Relative 1 0 - 1 %    Neutrophils Absolute 3 39 1 85 - 7 62 Thousands/µL    Immature Grans Absolute 0 01 0 00 - 0 20 Thousand/uL    Lymphocytes Absolute 1 60 0 60 - 4 47 Thousands/µL    Monocytes Absolute 0 49 0 17 - 1 22 Thousand/µL    Eosinophils Absolute 0 13 0 00 - 0 61 Thousand/µL    Basophils Absolute 0 05 0 00 - 0 10 Thousands/µL   Comprehensive metabolic panel   Result Value Ref Range    Sodium 141 136 - 145 mmol/L    Potassium 4 5 3 5 - 5 3 mmol/L    Chloride 108 100 - 108 mmol/L    CO2 31 21 - 32 mmol/L    ANION GAP 2 (L) 4 - 13 mmol/L    BUN 19 5 - 25 mg/dL    Creatinine 1 43 (H) 0 60 - 1 30 mg/dL    Glucose 97 65 - 140 mg/dL    Calcium 9 4 8 3 - 10 1 mg/dL    AST 27 5 - 45 U/L    ALT 41 12 - 78 U/L    Alkaline Phosphatase 102 46 - 116 U/L    Total Protein 7 2 6 4 - 8 2 g/dL    Albumin 3 9 3 5 - 5 0 g/dL    Total Bilirubin 1 26 (H) 0 20 - 1 00 mg/dL    eGFR 54 ml/min/1 73sq m   Lipid Panel with Direct LDL reflex   Result Value Ref Range    Cholesterol 176 See Comment mg/dL    Triglycerides 273 (H) See Comment mg/dL    HDL, Direct 33 (L) >=40 mg/dL    LDL Calculated 88 0 - 100 mg/dL   HEMOGLOBIN A1C W/ EAG ESTIMATION   Result Value Ref Range    Hemoglobin A1C 5 6 Normal 3 8-5 6%; PreDiabetic 5 7-6 4%;  Diabetic >=6 5%; Glycemic control for adults with diabetes <7 0% %     mg/dl     IMAGIN22 CT abd: stable 3 mm right renal calculus, bilateral simple renal cysts

## 2022-08-23 NOTE — PATIENT INSTRUCTIONS
You have stage III kidney disease  However, it has been stable for about a decade now  Your urinalysis indicates that you are likely to stay stable  Please try to avoid NSAIDs  Please check your BP at home twice a day for 1 week prior to your next appt  Follow up in 6 months - check labs 1-2 weeks before the appt

## 2022-08-23 NOTE — PROGRESS NOTES
NEPHROLOGY OUTPATIENT CONSULTATION   Jem Elmore 65 y o  male MRN: [id number]  Date: 08/23/22  Reason for consultation: Initial evaluation of elevated creatinine  ASSESSMENT and PLAN:  1  Chronic kidney disease, stage IIIA:  · Adan's baseline creatinine is 1 3 to 1 4 since 2012  · The etiology of his renal disease is not clear  His urinalysis is bland and his CT abd/pelvis did not show any hydronephrosis  · He does not have hypertension or diabetes  His hematologic parameters are not indicative of conditions that can predispose him to CKD  · Fortunately, he has no proteinuria which bodes for a good renal prognosis  · I recommend no changes at this time and would simply monitor going forward  · Would simply check a UPC ratio to quantify proteinuria (if any)  2  Mineral and bone disease:  · Check PTH, phos, and Vitamin D      3  Elevated blood pressure:  · Check BP twice daily at home for 1 week and bring BP readings in with next visit  · No medications recommended at this time  Patient Instructions   You have stage III kidney disease  However, it has been stable for about a decade now  Your urinalysis indicates that you are likely to stay stable  Please try to avoid NSAIDs  Please check your BP at home twice a day for 1 week prior to your next appt  Follow up in 6 months - check labs 1-2 weeks before the appt  HISTORY OF PRESENT ILLNESS:  Requesting Physician: Marah Martino MD    Jem Elmore is a 65 y o  male who was referred for initial evaluation of an elevated creatinine  Morgan Alva has a history of hyperlipidemia, nephrolithiasis, and degenerative disc disease  He is unaware of a history of hypertension, diabetes mellitus, cardiac , pulmonary, or vascular disease  He is also unaware of a history of chronic kidney disease    However, upon my review of the record, I note that his creatinine has been in the range of 1 29-1 68 based on readings between 2012 and 2022  For the most part, his creatinine ranges from 1 3 to 1 4  He had routine blood work in May 2022 and was found have a creatinine of 1 43 prompting renal consultation  His last passage of kidney stones was years ago  He denies any history of gross hematuria  He takes Aleve once every 2 months  He is currently not on any maintenance medications  PAST MEDICAL HISTORY:  1  HLP: currently not on meds  2  GERD: diet controlled  3  Nephrolithiasis  4  DDD    No known history of HTN, DM, HLP, CAD, CHF, CVA, PAD, COPD, VLADISLAV, asthma, thromboembolism, liver disease, BPH, malignancy, psychiatric disease  PAST SURGICAL HISTORY:  1  None  ALLERGIES:  Allergies   Allergen Reactions    Turmeric - Food Allergy Anaphylaxis    Niacin Other (See Comments)     Other reaction(s): NIACIN (NIACIN) (facial flushing)    Fenofibrate Dizziness     Other reaction(s): dizziness     SOCIAL HISTORY:  · Non smoker  · He drinks 2-3 beers a week when he is out  · No IVDA  FAMILY HISTORY:  · Mother with DM and ESRD on HD  · Sister with DM and ESRD on HD  MEDICATIONS:    Current Outpatient Medications:     Icosapent Ethyl (Vascepa) 1 g CAPS, Take 1 capsule (1 g total) by mouth 2 (two) times a day (Patient not taking: Reported on 8/23/2022), Disp: 60 capsule, Rfl: 2    Multiple Vitamins-Minerals (multivitamin with minerals) tablet, Take 1 tablet by mouth daily (Patient not taking: Reported on 8/23/2022), Disp: , Rfl:     NON FORMULARY, Take 1 Dose by mouth in the morning RELIEF FACTOR PACK- ANTI-INFLAMATORY (Patient not taking: Reported on 8/23/2022), Disp: , Rfl:     REVIEW OF SYSTEMS:  Review of Systems   Constitutional: Positive for fatigue  Negative for appetite change, chills and fever  HENT: Positive for hearing loss  Negative for postnasal drip, rhinorrhea, sinus pressure and sinus pain  Eyes: Negative for visual disturbance  Respiratory: Negative for cough and shortness of breath  Cardiovascular: Negative for chest pain and leg swelling  Gastrointestinal: Negative for abdominal pain, diarrhea, nausea and vomiting  Genitourinary: Negative for difficulty urinating, dysuria and hematuria  Musculoskeletal: Positive for back pain  Negative for arthralgias  Skin: Negative for rash  Allergic/Immunologic: Negative for immunocompromised state  Neurological: Negative for dizziness and light-headedness  Hematological: Does not bruise/bleed easily  Psychiatric/Behavioral: Negative for dysphoric mood and self-injury  All the systems were reviewed and were negative except as documented on the HPI  PHYSICAL EXAMINATION:  /88   Ht 5' 7 5" (1 715 m)   Wt 88 9 kg (196 lb)   BMI 30 24 kg/m²   Current Weight: Weight - Scale: 88 9 kg (196 lb) Body mass index is 30 24 kg/m²  General: conscious, coherent, cooperative, not in distress  Skin: warm, dry, good turgor  Eyes: pink conjunctivae, no scleral icterus  ENT: moist lips and mucous membranes  Respiratory: equal chest expansion, clear breath sounds  Cardiovascular: distinct heart sounds, normal rate, regular rhythm, no rub  Abdomen: soft, non-tender, non-distended, normoactive bowel sounds  Extremities: no edema  Genitourinary: no falk catheter  Neuro: awake, alert, oriented to time, place and person  Psych: appropriate affect       LABORATORY RESULTS:  Results for orders placed or performed in visit on 05/24/22   CBC and differential   Result Value Ref Range    WBC 5 67 4 31 - 10 16 Thousand/uL    RBC 5 30 3 88 - 5 62 Million/uL    Hemoglobin 16 4 12 0 - 17 0 g/dL    Hematocrit 47 0 36 5 - 49 3 %    MCV 89 82 - 98 fL    MCH 30 9 26 8 - 34 3 pg    MCHC 34 9 31 4 - 37 4 g/dL    RDW 12 1 11 6 - 15 1 %    MPV 11 6 8 9 - 12 7 fL    Platelets 457 952 - 250 Thousands/uL    nRBC 0 /100 WBCs    Neutrophils Relative 60 43 - 75 %    Immat GRANS % 0 0 - 2 %    Lymphocytes Relative 28 14 - 44 %    Monocytes Relative 9 4 - 12 % Eosinophils Relative 2 0 - 6 %    Basophils Relative 1 0 - 1 %    Neutrophils Absolute 3 39 1 85 - 7 62 Thousands/µL    Immature Grans Absolute 0 01 0 00 - 0 20 Thousand/uL    Lymphocytes Absolute 1 60 0 60 - 4 47 Thousands/µL    Monocytes Absolute 0 49 0 17 - 1 22 Thousand/µL    Eosinophils Absolute 0 13 0 00 - 0 61 Thousand/µL    Basophils Absolute 0 05 0 00 - 0 10 Thousands/µL   Comprehensive metabolic panel   Result Value Ref Range    Sodium 141 136 - 145 mmol/L    Potassium 4 5 3 5 - 5 3 mmol/L    Chloride 108 100 - 108 mmol/L    CO2 31 21 - 32 mmol/L    ANION GAP 2 (L) 4 - 13 mmol/L    BUN 19 5 - 25 mg/dL    Creatinine 1 43 (H) 0 60 - 1 30 mg/dL    Glucose 97 65 - 140 mg/dL    Calcium 9 4 8 3 - 10 1 mg/dL    AST 27 5 - 45 U/L    ALT 41 12 - 78 U/L    Alkaline Phosphatase 102 46 - 116 U/L    Total Protein 7 2 6 4 - 8 2 g/dL    Albumin 3 9 3 5 - 5 0 g/dL    Total Bilirubin 1 26 (H) 0 20 - 1 00 mg/dL    eGFR 54 ml/min/1 73sq m   Lipid Panel with Direct LDL reflex   Result Value Ref Range    Cholesterol 176 See Comment mg/dL    Triglycerides 273 (H) See Comment mg/dL    HDL, Direct 33 (L) >=40 mg/dL    LDL Calculated 88 0 - 100 mg/dL   HEMOGLOBIN A1C W/ EAG ESTIMATION   Result Value Ref Range    Hemoglobin A1C 5 6 Normal 3 8-5 6%; PreDiabetic 5 7-6 4%; Diabetic >=6 5%; Glycemic control for adults with diabetes <7 0% %     mg/dl     IMAGIN22 CT abd: stable 3 mm right renal calculus, bilateral simple renal cysts

## 2022-09-20 ENCOUNTER — OFFICE VISIT (OUTPATIENT)
Dept: INTERNAL MEDICINE CLINIC | Facility: CLINIC | Age: 57
End: 2022-09-20
Payer: COMMERCIAL

## 2022-09-20 VITALS
BODY MASS INDEX: 29.16 KG/M2 | SYSTOLIC BLOOD PRESSURE: 138 MMHG | HEIGHT: 68 IN | DIASTOLIC BLOOD PRESSURE: 80 MMHG | TEMPERATURE: 98.2 F | HEART RATE: 54 BPM | OXYGEN SATURATION: 98 % | WEIGHT: 192.4 LBS

## 2022-09-20 DIAGNOSIS — M54.31 SCIATIC PAIN, RIGHT: ICD-10-CM

## 2022-09-20 DIAGNOSIS — S39.012A BACK STRAIN, INITIAL ENCOUNTER: Primary | ICD-10-CM

## 2022-09-20 PROCEDURE — 99213 OFFICE O/P EST LOW 20 MIN: CPT | Performed by: INTERNAL MEDICINE

## 2022-09-20 RX ORDER — METHOCARBAMOL 500 MG/1
500 TABLET, FILM COATED ORAL 4 TIMES DAILY PRN
Qty: 28 TABLET | Refills: 0 | Status: SHIPPED | OUTPATIENT
Start: 2022-09-20

## 2022-09-20 RX ORDER — PREDNISONE 20 MG/1
20 TABLET ORAL DAILY
Qty: 5 TABLET | Refills: 0 | Status: SHIPPED | OUTPATIENT
Start: 2022-09-20 | End: 2022-09-25

## 2022-09-20 NOTE — ASSESSMENT & PLAN NOTE
- about 1 week of low back pain which has not improved with otc measures such as tylenol and motrin  - recommended avoiding motrin as he does have CKD stage 3  - no need for imaging at this time as it is acute without trauma or red flag symptoms  - will send in diclofenac gel and robaxin prn

## 2022-09-20 NOTE — ASSESSMENT & PLAN NOTE
- 2 days of radicular like pain going down the right leg  - no incontinence or saddle anesthesia   - will trial 5 day course of prednisone 20mg

## 2022-09-20 NOTE — PROGRESS NOTES
INTERNAL MEDICINE FOLLOW-UP OFFICE VISIT  [facility] - [facility] INTERNAL MEDICINE NURYS    NAME: Efra Razo  AGE: 65 y o  SEX: male  : 1965     DATE: 2022     Assessment and Plan:     Back strain  - about 1 week of low back pain which has not improved with otc measures such as tylenol and motrin  - recommended avoiding motrin as he does have CKD stage 3  - no need for imaging at this time as it is acute without trauma or red flag symptoms  - will send in diclofenac gel and robaxin prn    Sciatic pain, right  - 2 days of radicular like pain going down the right leg  - no incontinence or saddle anesthesia   - will trial 5 day course of prednisone 20mg      No follow-ups on file  Chief Complaint:     Chief Complaint   Patient presents with    Follow-up     Back pain for a week, pain and tightening        History of Present Illness:     Patient reoprts that last week he was doing twisting motions on a ladder when he felt a sharp pain in his right lower back  The pain was localized and semi-controlled with tylenol until 2 days ago  It started to have sharp shooting pains down the back of his leg along with tingling in his right foot  He states he did take motrin which helped with the pain  Patient denies any weakness, saddle anesthesia, or incontinence  The following portions of the patient's history were reviewed and updated as appropriate: allergies, current medications, past family history, past medical history, past social history, past surgical history and problem list      Review of Systems:     Review of Systems   Constitutional: Negative for fever  Gastrointestinal: Negative for diarrhea  Genitourinary: Negative for decreased urine volume, dysuria, enuresis and frequency  Musculoskeletal: Positive for back pain  Negative for arthralgias, gait problem and joint swelling  Neurological: Positive for numbness  Negative for weakness          Problem List:     Patient Active Problem List   Diagnosis    Hyperglyceridemia, pure    Impaired fasting glucose    Chronic right-sided low back pain without sciatica    Right flank pain    Stage 3a chronic kidney disease (HCC)    Elevated blood-pressure reading without diagnosis of hypertension    Bilateral renal cysts    Grade II hemorrhoids    History of colonic polyps    History of urinary stone    Kidney stone    Elevated bilirubin    Mixed hyperlipidemia    Chronic kidney disease-mineral and bone disorder        Objective:     /80 (BP Location: Right arm, Patient Position: Sitting, Cuff Size: Standard)   Pulse (!) 54   Temp 98 2 °F (36 8 °C) (Tympanic)   Ht 5' 7 5" (1 715 m)   Wt 87 3 kg (192 lb 6 4 oz)   SpO2 98%   BMI 29 69 kg/m²     Physical Exam  Constitutional:       General: He is not in acute distress  Appearance: He is normal weight  Cardiovascular:      Rate and Rhythm: Normal rate and regular rhythm  Pulses: Normal pulses  Pulmonary:      Effort: Pulmonary effort is normal       Breath sounds: Normal breath sounds  No wheezing or rales  Musculoskeletal:      Right lower leg: No edema  Left lower leg: No edema  Comments: No midline tenderness on spine  No tenderness over SI joints  Straight leg raising negative bilaterally   Neurological:      Mental Status: He is alert  Sensory: No sensory deficit  Motor: No weakness  Gait: Gait normal       Deep Tendon Reflexes: Reflexes normal       Comments: Bilateral patellar DTR wnl  Bilateral achilles DTR wnl  No loss of pinprick sensation bilateral lower extremities   Psychiatric:         Mood and Affect: Mood normal          Thought Content:  Thought content normal          Pertinent Laboratory/Diagnostic Studies:    Laboratory Results: I have personally reviewed the pertinent laboratory results/reports     Ivan Santa DO  [address]

## 2022-09-21 NOTE — ASSESSMENT & PLAN NOTE
Counseled patient on the importance of lifestyle interventions, specifically discussed a whole food, plant based diet low in saturated fat and processed foods  Discussed the importance of a diet that is rich in whole grains, fruits and vegetables, beans and legumes  Patient went to  recently called to have apixaBAN (ELIQUIS) 5 MG Tab 60 tablets refilled by [employer]. Patient was told there would be a $90 copay for the 30-day supply (60 tablets). Pharmacist told patient that the copay would only be $120 if prescribed as a 90-day prescription (180 tablets). Pharmacist then told patient that the prescription could be updated within 30 days to be corrected and received at the same price. Patient requesting recently filled apixaBAN (ELIQUIS) 5 MG be resubmitted as a 90-day prescription (180 tablets) for cost-saving reasons.

## 2023-02-28 ENCOUNTER — APPOINTMENT (OUTPATIENT)
Dept: LAB | Facility: CLINIC | Age: 58
End: 2023-02-28

## 2023-02-28 ENCOUNTER — OFFICE VISIT (OUTPATIENT)
Dept: NEPHROLOGY | Facility: CLINIC | Age: 58
End: 2023-02-28

## 2023-02-28 VITALS
HEIGHT: 68 IN | WEIGHT: 198 LBS | HEART RATE: 78 BPM | DIASTOLIC BLOOD PRESSURE: 92 MMHG | BODY MASS INDEX: 30.01 KG/M2 | SYSTOLIC BLOOD PRESSURE: 148 MMHG

## 2023-02-28 DIAGNOSIS — M89.9 CHRONIC KIDNEY DISEASE-MINERAL AND BONE DISORDER: ICD-10-CM

## 2023-02-28 DIAGNOSIS — N20.0 KIDNEY STONE: ICD-10-CM

## 2023-02-28 DIAGNOSIS — N18.31 STAGE 3A CHRONIC KIDNEY DISEASE (HCC): ICD-10-CM

## 2023-02-28 DIAGNOSIS — E83.9 CHRONIC KIDNEY DISEASE-MINERAL AND BONE DISORDER: ICD-10-CM

## 2023-02-28 DIAGNOSIS — N18.31 STAGE 3A CHRONIC KIDNEY DISEASE (HCC): Primary | ICD-10-CM

## 2023-02-28 DIAGNOSIS — R03.0 ELEVATED BLOOD-PRESSURE READING WITHOUT DIAGNOSIS OF HYPERTENSION: ICD-10-CM

## 2023-02-28 DIAGNOSIS — N18.9 CHRONIC KIDNEY DISEASE-MINERAL AND BONE DISORDER: ICD-10-CM

## 2023-02-28 LAB
25(OH)D3 SERPL-MCNC: 15.5 NG/ML (ref 30–100)
ALBUMIN SERPL BCP-MCNC: 4.3 G/DL (ref 3.5–5)
ANION GAP SERPL CALCULATED.3IONS-SCNC: 5 MMOL/L (ref 4–13)
BACTERIA UR QL AUTO: ABNORMAL /HPF
BILIRUB UR QL STRIP: NEGATIVE
BUN SERPL-MCNC: 23 MG/DL (ref 5–25)
CALCIUM SERPL-MCNC: 9.3 MG/DL (ref 8.4–10.2)
CHLORIDE SERPL-SCNC: 103 MMOL/L (ref 96–108)
CLARITY UR: CLEAR
CO2 SERPL-SCNC: 31 MMOL/L (ref 21–32)
COLOR UR: ABNORMAL
CREAT SERPL-MCNC: 1.35 MG/DL (ref 0.6–1.3)
CREAT UR-MCNC: 176.2 MG/DL
GFR SERPL CREATININE-BSD FRML MDRD: 58 ML/MIN/1.73SQ M
GLUCOSE P FAST SERPL-MCNC: 116 MG/DL (ref 65–99)
GLUCOSE UR STRIP-MCNC: NEGATIVE MG/DL
HGB UR QL STRIP.AUTO: NEGATIVE
KETONES UR STRIP-MCNC: NEGATIVE MG/DL
LEUKOCYTE ESTERASE UR QL STRIP: NEGATIVE
MAGNESIUM SERPL-MCNC: 2.1 MG/DL (ref 1.9–2.7)
MUCOUS THREADS UR QL AUTO: ABNORMAL
NITRITE UR QL STRIP: NEGATIVE
NON-SQ EPI CELLS URNS QL MICRO: ABNORMAL /HPF
PH UR STRIP.AUTO: 5.5 [PH]
PHOSPHATE SERPL-MCNC: 3.1 MG/DL (ref 2.7–4.5)
POTASSIUM SERPL-SCNC: 5.2 MMOL/L (ref 3.5–5.3)
PROT UR STRIP-MCNC: NEGATIVE MG/DL
PROT UR-MCNC: 10 MG/DL
PROT/CREAT UR: 0.06 MG/G{CREAT} (ref 0–0.1)
PTH-INTACT SERPL-MCNC: 47 PG/ML (ref 18.4–80.1)
RBC #/AREA URNS AUTO: ABNORMAL /HPF
SODIUM SERPL-SCNC: 139 MMOL/L (ref 135–147)
SP GR UR STRIP.AUTO: 1.02 (ref 1–1.03)
UROBILINOGEN UR STRIP-ACNC: <2 MG/DL
WBC #/AREA URNS AUTO: ABNORMAL /HPF

## 2023-02-28 NOTE — PATIENT INSTRUCTIONS
You have chronic kidney disease (CKD) and your kidney function is stable  Please check a kidney ultrasound  Please start Vitamin D 2000 units daily  Work on a low sodium diet and increased exercise to help lower your BP  Please check your BP twice daily for 1 week and bring a log with your next visit  Follow up in 12 months - please obtain blood work 1-2 weeks prior to the appointment  Please avoid taking NSAIDs (Ibuprofen, Motrin, Aleve, Advil, Naproxen, Celebrex, etc )  Make sure you are eating healthy and have adequate exercise

## 2023-02-28 NOTE — PROGRESS NOTES
NEPHROLOGY OFFICE PROGRESS NOTE   Marely Black 65 y o  male MRN: [id number]  DATE: 02/28/23  Reason for visit: Continued evaluation and management of CKD    ASSESSMENT & PLAN:  1  Chronic kidney disease, stage IIIA:  · Adan's baseline creatinine is 1 3 to 1 4 since 2012  · Etiology for CKD is not clear - possibly arterio and arteriolosclerosis  · No proteinuria  · Stable renal function - SCr 1 35  · Treatment/Management consists of controlling modifiable risk factors (good blood pressure, glycemic and lipid control) and avoidance of nephrotoxic medications in order to slow down progression of renal disease  · Check renal US given concomitant back pain  2  Mineral and bone disease:  · PTH is at goal   47 0 in February 2023  · Ca and phos are at goal    · Vitamin D is below goal   15 5 in Feb 2023  Start vitamin D3 2000 units daily  3  Nephrolithiasis:  · Check renal ultrasound given right lower back pain  4  Elevated blood pressure:  · Not checking BP at home  · Advised low-sodium diet and increased exercise  · Advised to check BP at home twice a day prior to his next visit in 1 year  Patient Instructions   You have chronic kidney disease (CKD) and your kidney function is stable  Please check a kidney ultrasound  Please start Vitamin D 2000 units daily  Work on a low sodium diet and increased exercise to help lower your BP  Please check your BP twice daily for 1 week and bring a log with your next visit  Follow up in 12 months - please obtain blood work 1-2 weeks prior to the appointment  Please avoid taking NSAIDs (Ibuprofen, Motrin, Aleve, Advil, Naproxen, Celebrex, etc )  Make sure you are eating healthy and have adequate exercise  SUBJECTIVE / INTERVAL HISTORY:  Gypsy Main was seen in the office back in August 2022 on initial consultation  Since that time, there have been no recent hospitalizations or ER visits  He has been having pain on his R lower back     He had COVID a few months ago but has recovered since  Not checking BP at home  No new acute complaints  PMH/PSH: HLP, GERD, nephrolithiasis, DDD    Previous work up:   4/22/22 CT abd: stable 3 mm right renal calculus, bilateral simple renal cysts  ALLERGIES:   Allergies   Allergen Reactions   • Turmeric - Food Allergy Anaphylaxis   • Niacin Other (See Comments)     Other reaction(s): NIACIN (NIACIN) (facial flushing)   • Fenofibrate Dizziness     Other reaction(s): dizziness     REVIEW OF SYSTEMS:  Review of Systems   Constitutional: Negative for appetite change, chills, fatigue and fever  Respiratory: Negative for cough and shortness of breath  Cardiovascular: Negative for chest pain and leg swelling  Gastrointestinal: Negative for abdominal pain, diarrhea, nausea and vomiting  Genitourinary: Negative for dysuria and hematuria  Musculoskeletal: Positive for back pain  Negative for arthralgias  Neurological: Positive for light-headedness (1 episode after recovering from [facility])  Negative for dizziness  OBJECTIVE:  /92 (BP Location: Left arm, Patient Position: Sitting, Cuff Size: Large)   Pulse 78   Ht 5' 7 5" (1 715 m)   Wt 89 8 kg (198 lb)   BMI 30 55 kg/m²   Current Weight: Weight - Scale: 89 8 kg (198 lb) Body mass index is 30 55 kg/m²  Physical Exam  Constitutional:       General: He is not in acute distress  Appearance: Normal appearance  He is well-developed  He is not ill-appearing or diaphoretic  HENT:      Head: Normocephalic and atraumatic  Eyes:      General: No scleral icterus  Conjunctiva/sclera: Conjunctivae normal    Neck:      Vascular: No JVD  Cardiovascular:      Rate and Rhythm: Normal rate and regular rhythm  Heart sounds: Normal heart sounds  Pulmonary:      Effort: Pulmonary effort is normal  No respiratory distress  Breath sounds: Normal breath sounds     Abdominal:      General: Bowel sounds are normal       Palpations: Abdomen is soft    Musculoskeletal:      Cervical back: Neck supple  Right lower leg: No edema  Left lower leg: No edema  Skin:     General: Skin is warm and dry  Neurological:      Mental Status: He is alert and oriented to person, place, and time     Psychiatric:         Mood and Affect: Mood normal          Behavior: Behavior normal          Judgment: Judgment normal        Medications:  Current Outpatient Medications:   •  Diclofenac Sodium (VOLTAREN) 1 %, Apply 2 g topically 4 (four) times a day (Patient not taking: Reported on 2/28/2023), Disp: 100 g, Rfl: 2  •  Icosapent Ethyl (Vascepa) 1 g CAPS, Take 1 capsule (1 g total) by mouth 2 (two) times a day (Patient not taking: Reported on 8/23/2022), Disp: 60 capsule, Rfl: 2  •  methocarbamol (ROBAXIN) 500 mg tablet, Take 1 tablet (500 mg total) by mouth 4 (four) times a day as needed for muscle spasms (Patient not taking: Reported on 2/28/2023), Disp: 28 tablet, Rfl: 0  •  Multiple Vitamins-Minerals (multivitamin with minerals) tablet, Take 1 tablet by mouth daily (Patient not taking: Reported on 8/23/2022), Disp: , Rfl:   •  NON FORMULARY, Take 1 Dose by mouth in the morning RELIEF FACTOR PACK- ANTI-INFLAMATORY (Patient not taking: Reported on 8/23/2022), Disp: , Rfl:     Laboratory Results:  Results for orders placed or performed in visit on 02/28/23   Magnesium   Result Value Ref Range    Magnesium 2 1 1 9 - 2 7 mg/dL   Renal function panel   Result Value Ref Range    Albumin 4 3 3 5 - 5 0 g/dL    Calcium 9 3 8 4 - 10 2 mg/dL    Phosphorus 3 1 2 7 - 4 5 mg/dL    BUN 23 5 - 25 mg/dL    Creatinine 1 35 (H) 0 60 - 1 30 mg/dL    Sodium 139 135 - 147 mmol/L    Potassium 5 2 3 5 - 5 3 mmol/L    Chloride 103 96 - 108 mmol/L    CO2 31 21 - 32 mmol/L    ANION GAP 5 4 - 13 mmol/L    eGFR 58 ml/min/1 73sq m    Glucose, Fasting 116 (H) 65 - 99 mg/dL   PTH, intact   Result Value Ref Range    PTH 47 0 18 4 - 80 1 pg/mL   Protein / creatinine ratio, urine   Result Value Ref Range    Creatinine, Ur 176 2 mg/dL    Protein Urine Random 10 mg/dL    Prot/Creat Ratio, Ur 0 06 0 00 - 0 10   Vitamin D 25 hydroxy   Result Value Ref Range    Vit D, 25-Hydroxy 15 5 (L) 30 0 - 100 0 ng/mL   Urinalysis with microscopic   Result Value Ref Range    Color, UA Light Yellow     Clarity, UA Clear     Specific [place], UA 1 024 1 003 - 1 030    pH, UA 5 5 4 5, 5 0, 5 5, 6 0, 6 5, 7 0, 7 5, 8 0    Leukocytes, UA Negative Negative    Nitrite, UA Negative Negative    Protein, UA Negative Negative mg/dl    Glucose, UA Negative Negative mg/dl    Ketones, UA Negative Negative mg/dl    Urobilinogen, UA <2 0 <2 0 mg/dl mg/dl    Bilirubin, UA Negative Negative    Occult Blood, UA Negative Negative    RBC, UA 1-2 None Seen, 1-2 /hpf    WBC, UA None Seen None Seen, 1-2 /hpf    Epithelial Cells Occasional None Seen, Occasional /hpf    Bacteria, UA None Seen None Seen, Occasional /hpf    MUCUS THREADS Occasional (A) None Seen

## 2023-03-09 ENCOUNTER — HOSPITAL ENCOUNTER (OUTPATIENT)
Dept: ULTRASOUND IMAGING | Facility: HOSPITAL | Age: 58
Discharge: HOME | End: 2023-03-09
Attending: INTERNAL MEDICINE

## 2023-03-09 DIAGNOSIS — N18.31 STAGE 3A CHRONIC KIDNEY DISEASE (HCC): ICD-10-CM

## 2023-03-17 ENCOUNTER — TELEPHONE (OUTPATIENT)
Dept: NEPHROLOGY | Facility: CLINIC | Age: 58
End: 2023-03-17

## 2023-03-17 NOTE — TELEPHONE ENCOUNTER
Pt advised that renal US showed no acute findings except for a cyst (R) kidney which is no need for concern per Dr Cindy Kovacs     ----- Message from Jesus Longo MD sent at 3/17/2023  1:06 PM EDT -----  Please inform patient that renal US showed no acute findings  He does have a cyst in the right kidney but it is not cause for concern

## 2023-06-18 ENCOUNTER — HOSPITAL ENCOUNTER (INPATIENT)
Facility: HOSPITAL | Age: 58
LOS: 2 days | Discharge: HOME | DRG: 247 | End: 2023-06-20
Attending: EMERGENCY MEDICINE | Admitting: INTERNAL MEDICINE
Payer: COMMERCIAL

## 2023-06-18 ENCOUNTER — APPOINTMENT (EMERGENCY)
Dept: RADIOLOGY | Facility: HOSPITAL | Age: 58
DRG: 247 | End: 2023-06-18
Payer: COMMERCIAL

## 2023-06-18 DIAGNOSIS — I21.3 STEMI (ST ELEVATION MYOCARDIAL INFARCTION) (HCC): ICD-10-CM

## 2023-06-18 DIAGNOSIS — I21.3 ST ELEVATION MYOCARDIAL INFARCTION (STEMI), UNSPECIFIED ARTERY (HCC): Primary | ICD-10-CM

## 2023-06-18 PROBLEM — I21.02 ACUTE ST ELEVATION MYOCARDIAL INFARCTION (STEMI) DUE TO OCCLUSION OF LEFT ANTERIOR DESCENDING (LAD) CORONARY ARTERY (HCC): Status: ACTIVE | Noted: 2023-06-18

## 2023-06-18 LAB
ALBUMIN SERPL BCP-MCNC: 4.6 G/DL (ref 3.5–5)
ALP SERPL-CCNC: 91 U/L (ref 34–104)
ALT SERPL W P-5'-P-CCNC: 46 U/L (ref 7–52)
ANION GAP SERPL CALCULATED.3IONS-SCNC: 7 MMOL/L (ref 4–13)
AST SERPL W P-5'-P-CCNC: 35 U/L (ref 13–39)
ATRIAL RATE: 60 BPM
ATRIAL RATE: 61 BPM
ATRIAL RATE: 62 BPM
BASOPHILS # BLD AUTO: 0.04 THOUSANDS/ÂΜL (ref 0–0.1)
BASOPHILS NFR BLD AUTO: 0 % (ref 0–1)
BILIRUB SERPL-MCNC: 0.81 MG/DL (ref 0.2–1)
BUN SERPL-MCNC: 26 MG/DL (ref 5–25)
CALCIUM SERPL-MCNC: 9.3 MG/DL (ref 8.4–10.2)
CARDIAC TROPONIN I PNL SERPL HS: 60 NG/L
CHLORIDE SERPL-SCNC: 105 MMOL/L (ref 96–108)
CO2 SERPL-SCNC: 29 MMOL/L (ref 21–32)
CREAT SERPL-MCNC: 1.76 MG/DL (ref 0.6–1.3)
EOSINOPHIL # BLD AUTO: 0.11 THOUSAND/ÂΜL (ref 0–0.61)
EOSINOPHIL NFR BLD AUTO: 1 % (ref 0–6)
ERYTHROCYTE [DISTWIDTH] IN BLOOD BY AUTOMATED COUNT: 11.9 % (ref 11.6–15.1)
GFR SERPL CREATININE-BSD FRML MDRD: 42 ML/MIN/1.73SQ M
GLUCOSE SERPL-MCNC: 148 MG/DL (ref 65–140)
HCT VFR BLD AUTO: 49.5 % (ref 36.5–49.3)
HGB BLD-MCNC: 17 G/DL (ref 12–17)
IMM GRANULOCYTES # BLD AUTO: 0.04 THOUSAND/UL (ref 0–0.2)
IMM GRANULOCYTES NFR BLD AUTO: 0 % (ref 0–2)
KCT BLD-ACNC: 151 SEC (ref 89–137)
KCT BLD-ACNC: 220 SEC (ref 89–137)
LIPASE SERPL-CCNC: 21 U/L (ref 11–82)
LYMPHOCYTES # BLD AUTO: 1.51 THOUSANDS/ÂΜL (ref 0.6–4.47)
LYMPHOCYTES NFR BLD AUTO: 14 % (ref 14–44)
MCH RBC QN AUTO: 31.1 PG (ref 26.8–34.3)
MCHC RBC AUTO-ENTMCNC: 34.3 G/DL (ref 31.4–37.4)
MCV RBC AUTO: 91 FL (ref 82–98)
MONOCYTES # BLD AUTO: 0.63 THOUSAND/ÂΜL (ref 0.17–1.22)
MONOCYTES NFR BLD AUTO: 6 % (ref 4–12)
NEUTROPHILS # BLD AUTO: 8.22 THOUSANDS/ÂΜL (ref 1.85–7.62)
NEUTS SEG NFR BLD AUTO: 79 % (ref 43–75)
NRBC BLD AUTO-RTO: 0 /100 WBCS
P AXIS: -54 DEGREES
P AXIS: -76 DEGREES
P AXIS: 70 DEGREES
PLATELET # BLD AUTO: 205 THOUSANDS/UL (ref 149–390)
PMV BLD AUTO: 10.2 FL (ref 8.9–12.7)
POTASSIUM SERPL-SCNC: 4.5 MMOL/L (ref 3.5–5.3)
PR INTERVAL: 120 MS
PR INTERVAL: 162 MS
PR INTERVAL: 166 MS
PROT SERPL-MCNC: 7.4 G/DL (ref 6.4–8.4)
QRS AXIS: 18 DEGREES
QRS AXIS: 31 DEGREES
QRS AXIS: 38 DEGREES
QRSD INTERVAL: 126 MS
QRSD INTERVAL: 78 MS
QRSD INTERVAL: 80 MS
QT INTERVAL: 404 MS
QT INTERVAL: 408 MS
QT INTERVAL: 430 MS
QTC INTERVAL: 406 MS
QTC INTERVAL: 414 MS
QTC INTERVAL: 430 MS
RBC # BLD AUTO: 5.47 MILLION/UL (ref 3.88–5.62)
SODIUM SERPL-SCNC: 141 MMOL/L (ref 135–147)
SPECIMEN SOURCE: ABNORMAL
SPECIMEN SOURCE: ABNORMAL
T WAVE AXIS: -13 DEGREES
T WAVE AXIS: 0 DEGREES
T WAVE AXIS: 31 DEGREES
VENTRICULAR RATE: 60 BPM
VENTRICULAR RATE: 61 BPM
VENTRICULAR RATE: 62 BPM
WBC # BLD AUTO: 10.55 THOUSAND/UL (ref 4.31–10.16)

## 2023-06-18 PROCEDURE — C1753 CATH, INTRAVAS ULTRASOUND: HCPCS | Performed by: INTERNAL MEDICINE

## 2023-06-18 PROCEDURE — 92941 PRQ TRLML REVSC TOT OCCL AMI: CPT | Performed by: INTERNAL MEDICINE

## 2023-06-18 PROCEDURE — 83690 ASSAY OF LIPASE: CPT

## 2023-06-18 PROCEDURE — C1725 CATH, TRANSLUMIN NON-LASER: HCPCS | Performed by: INTERNAL MEDICINE

## 2023-06-18 PROCEDURE — 84484 ASSAY OF TROPONIN QUANT: CPT | Performed by: EMERGENCY MEDICINE

## 2023-06-18 PROCEDURE — C1769 GUIDE WIRE: HCPCS | Performed by: INTERNAL MEDICINE

## 2023-06-18 PROCEDURE — 36415 COLL VENOUS BLD VENIPUNCTURE: CPT

## 2023-06-18 PROCEDURE — 99152 MOD SED SAME PHYS/QHP 5/>YRS: CPT | Performed by: INTERNAL MEDICINE

## 2023-06-18 PROCEDURE — 99285 EMERGENCY DEPT VISIT HI MDM: CPT

## 2023-06-18 PROCEDURE — 93458 L HRT ARTERY/VENTRICLE ANGIO: CPT | Performed by: INTERNAL MEDICINE

## 2023-06-18 PROCEDURE — C9606 PERC D-E COR REVASC W AMI S: HCPCS | Performed by: INTERNAL MEDICINE

## 2023-06-18 PROCEDURE — 99223 1ST HOSP IP/OBS HIGH 75: CPT | Performed by: INTERNAL MEDICINE

## 2023-06-18 PROCEDURE — 93005 ELECTROCARDIOGRAM TRACING: CPT

## 2023-06-18 PROCEDURE — 96375 TX/PRO/DX INJ NEW DRUG ADDON: CPT

## 2023-06-18 PROCEDURE — 85025 COMPLETE CBC W/AUTO DIFF WBC: CPT

## 2023-06-18 PROCEDURE — 85347 COAGULATION TIME ACTIVATED: CPT

## 2023-06-18 PROCEDURE — C1887 CATHETER, GUIDING: HCPCS | Performed by: INTERNAL MEDICINE

## 2023-06-18 PROCEDURE — 96376 TX/PRO/DX INJ SAME DRUG ADON: CPT

## 2023-06-18 PROCEDURE — B2111ZZ FLUOROSCOPY OF MULTIPLE CORONARY ARTERIES USING LOW OSMOLAR CONTRAST: ICD-10-PCS | Performed by: INTERNAL MEDICINE

## 2023-06-18 PROCEDURE — 4A023N7 MEASUREMENT OF CARDIAC SAMPLING AND PRESSURE, LEFT HEART, PERCUTANEOUS APPROACH: ICD-10-PCS | Performed by: INTERNAL MEDICINE

## 2023-06-18 PROCEDURE — 92978 ENDOLUMINL IVUS OCT C 1ST: CPT | Performed by: INTERNAL MEDICINE

## 2023-06-18 PROCEDURE — 027034Z DILATION OF CORONARY ARTERY, ONE ARTERY WITH DRUG-ELUTING INTRALUMINAL DEVICE, PERCUTANEOUS APPROACH: ICD-10-PCS | Performed by: INTERNAL MEDICINE

## 2023-06-18 PROCEDURE — 71045 X-RAY EXAM CHEST 1 VIEW: CPT

## 2023-06-18 PROCEDURE — 80053 COMPREHEN METABOLIC PANEL: CPT

## 2023-06-18 PROCEDURE — B2151ZZ FLUOROSCOPY OF LEFT HEART USING LOW OSMOLAR CONTRAST: ICD-10-PCS | Performed by: INTERNAL MEDICINE

## 2023-06-18 PROCEDURE — 96374 THER/PROPH/DIAG INJ IV PUSH: CPT

## 2023-06-18 PROCEDURE — 99153 MOD SED SAME PHYS/QHP EA: CPT | Performed by: INTERNAL MEDICINE

## 2023-06-18 PROCEDURE — C1874 STENT, COATED/COV W/DEL SYS: HCPCS | Performed by: INTERNAL MEDICINE

## 2023-06-18 PROCEDURE — 93010 ELECTROCARDIOGRAM REPORT: CPT | Performed by: INTERNAL MEDICINE

## 2023-06-18 PROCEDURE — C1894 INTRO/SHEATH, NON-LASER: HCPCS | Performed by: INTERNAL MEDICINE

## 2023-06-18 DEVICE — EVEROLIMUS-ELUTING PLATINUM CHROMIUM CORONARY STENT SYSTEM
Type: IMPLANTABLE DEVICE | Site: CORONARY | Status: FUNCTIONAL
Brand: SYNERGY™ XD

## 2023-06-18 RX ORDER — NITROGLYCERIN 0.4 MG/1
TABLET SUBLINGUAL CODE/TRAUMA/SEDATION MEDICATION
Status: COMPLETED | OUTPATIENT
Start: 2023-06-18 | End: 2023-06-18

## 2023-06-18 RX ORDER — ATORVASTATIN CALCIUM 40 MG/1
80 TABLET, FILM COATED ORAL
Status: DISCONTINUED | OUTPATIENT
Start: 2023-06-19 | End: 2023-06-20 | Stop reason: HOSPADM

## 2023-06-18 RX ORDER — LIDOCAINE HYDROCHLORIDE 10 MG/ML
INJECTION, SOLUTION EPIDURAL; INFILTRATION; INTRACAUDAL; PERINEURAL CODE/TRAUMA/SEDATION MEDICATION
Status: DISCONTINUED | OUTPATIENT
Start: 2023-06-18 | End: 2023-06-18 | Stop reason: HOSPADM

## 2023-06-18 RX ORDER — FENTANYL CITRATE 50 UG/ML
INJECTION, SOLUTION INTRAMUSCULAR; INTRAVENOUS
Status: DISPENSED
Start: 2023-06-18 | End: 2023-06-19

## 2023-06-18 RX ORDER — ONDANSETRON 2 MG/ML
4 INJECTION INTRAMUSCULAR; INTRAVENOUS EVERY 6 HOURS PRN
Status: DISCONTINUED | OUTPATIENT
Start: 2023-06-18 | End: 2023-06-20 | Stop reason: HOSPADM

## 2023-06-18 RX ORDER — FLUMAZENIL 0.1 MG/ML
INJECTION INTRAVENOUS CODE/TRAUMA/SEDATION MEDICATION
Status: DISCONTINUED | OUTPATIENT
Start: 2023-06-18 | End: 2023-06-18 | Stop reason: HOSPADM

## 2023-06-18 RX ORDER — ONDANSETRON 2 MG/ML
INJECTION INTRAMUSCULAR; INTRAVENOUS
Status: DISCONTINUED
Start: 2023-06-18 | End: 2023-06-18 | Stop reason: WASHOUT

## 2023-06-18 RX ORDER — ALBUMIN, HUMAN INJ 5% 5 %
12.5 SOLUTION INTRAVENOUS ONCE
Status: COMPLETED | OUTPATIENT
Start: 2023-06-18 | End: 2023-06-18

## 2023-06-18 RX ORDER — NITROGLYCERIN 20 MG/100ML
INJECTION INTRAVENOUS CODE/TRAUMA/SEDATION MEDICATION
Status: DISCONTINUED | OUTPATIENT
Start: 2023-06-18 | End: 2023-06-18 | Stop reason: HOSPADM

## 2023-06-18 RX ORDER — CHLORHEXIDINE GLUCONATE 0.12 MG/ML
15 RINSE ORAL EVERY 12 HOURS SCHEDULED
Status: DISCONTINUED | OUTPATIENT
Start: 2023-06-18 | End: 2023-06-19

## 2023-06-18 RX ORDER — SODIUM CHLORIDE 9 MG/ML
75 INJECTION, SOLUTION INTRAVENOUS CONTINUOUS
Status: DISCONTINUED | OUTPATIENT
Start: 2023-06-18 | End: 2023-06-18

## 2023-06-18 RX ORDER — ACETAMINOPHEN 325 MG/1
650 TABLET ORAL EVERY 6 HOURS PRN
Status: DISCONTINUED | OUTPATIENT
Start: 2023-06-18 | End: 2023-06-20 | Stop reason: HOSPADM

## 2023-06-18 RX ORDER — NITROGLYCERIN 0.4 MG/1
0.4 TABLET SUBLINGUAL
Status: DISCONTINUED | OUTPATIENT
Start: 2023-06-18 | End: 2023-06-20 | Stop reason: HOSPADM

## 2023-06-18 RX ORDER — SODIUM CHLORIDE 9 MG/ML
INJECTION, SOLUTION INTRAVENOUS
Status: COMPLETED | OUTPATIENT
Start: 2023-06-18 | End: 2023-06-18

## 2023-06-18 RX ORDER — LORAZEPAM 2 MG/ML
0.5 INJECTION INTRAMUSCULAR ONCE
Status: COMPLETED | OUTPATIENT
Start: 2023-06-18 | End: 2023-06-18

## 2023-06-18 RX ORDER — SODIUM CHLORIDE, SODIUM GLUCONATE, SODIUM ACETATE, POTASSIUM CHLORIDE, MAGNESIUM CHLORIDE, SODIUM PHOSPHATE, DIBASIC, AND POTASSIUM PHOSPHATE .53; .5; .37; .037; .03; .012; .00082 G/100ML; G/100ML; G/100ML; G/100ML; G/100ML; G/100ML; G/100ML
75 INJECTION, SOLUTION INTRAVENOUS CONTINUOUS
Status: DISCONTINUED | OUTPATIENT
Start: 2023-06-18 | End: 2023-06-19

## 2023-06-18 RX ORDER — ASPIRIN 81 MG/1
TABLET, CHEWABLE ORAL CODE/TRAUMA/SEDATION MEDICATION
Status: COMPLETED | OUTPATIENT
Start: 2023-06-18 | End: 2023-06-18

## 2023-06-18 RX ORDER — MIDAZOLAM HYDROCHLORIDE 2 MG/2ML
INJECTION, SOLUTION INTRAMUSCULAR; INTRAVENOUS CODE/TRAUMA/SEDATION MEDICATION
Status: DISCONTINUED | OUTPATIENT
Start: 2023-06-18 | End: 2023-06-18 | Stop reason: HOSPADM

## 2023-06-18 RX ORDER — FENTANYL CITRATE 50 UG/ML
INJECTION, SOLUTION INTRAMUSCULAR; INTRAVENOUS CODE/TRAUMA/SEDATION MEDICATION
Status: COMPLETED | OUTPATIENT
Start: 2023-06-18 | End: 2023-06-18

## 2023-06-18 RX ORDER — SODIUM CHLORIDE, SODIUM GLUCONATE, SODIUM ACETATE, POTASSIUM CHLORIDE, MAGNESIUM CHLORIDE, SODIUM PHOSPHATE, DIBASIC, AND POTASSIUM PHOSPHATE .53; .5; .37; .037; .03; .012; .00082 G/100ML; G/100ML; G/100ML; G/100ML; G/100ML; G/100ML; G/100ML
1000 INJECTION, SOLUTION INTRAVENOUS ONCE
Status: COMPLETED | OUTPATIENT
Start: 2023-06-18 | End: 2023-06-18

## 2023-06-18 RX ORDER — HEPARIN SODIUM 1000 [USP'U]/ML
INJECTION, SOLUTION INTRAVENOUS; SUBCUTANEOUS CODE/TRAUMA/SEDATION MEDICATION
Status: COMPLETED | OUTPATIENT
Start: 2023-06-18 | End: 2023-06-18

## 2023-06-18 RX ORDER — HEPARIN SODIUM 1000 [USP'U]/ML
INJECTION, SOLUTION INTRAVENOUS; SUBCUTANEOUS CODE/TRAUMA/SEDATION MEDICATION
Status: DISCONTINUED | OUTPATIENT
Start: 2023-06-18 | End: 2023-06-18 | Stop reason: HOSPADM

## 2023-06-18 RX ORDER — FENTANYL CITRATE 50 UG/ML
INJECTION, SOLUTION INTRAMUSCULAR; INTRAVENOUS CODE/TRAUMA/SEDATION MEDICATION
Status: DISCONTINUED | OUTPATIENT
Start: 2023-06-18 | End: 2023-06-18 | Stop reason: HOSPADM

## 2023-06-18 RX ORDER — ENOXAPARIN SODIUM 100 MG/ML
40 INJECTION SUBCUTANEOUS DAILY
Status: DISCONTINUED | OUTPATIENT
Start: 2023-06-19 | End: 2023-06-20 | Stop reason: HOSPADM

## 2023-06-18 RX ADMIN — NITROGLYCERIN 0.4 MG: 0.4 TABLET SUBLINGUAL at 14:20

## 2023-06-18 RX ADMIN — LORAZEPAM 0.5 MG: 2 INJECTION INTRAMUSCULAR; INTRAVENOUS at 23:27

## 2023-06-18 RX ADMIN — ASPIRIN 81 MG 324 MG: 81 TABLET ORAL at 14:20

## 2023-06-18 RX ADMIN — SODIUM CHLORIDE, SODIUM GLUCONATE, SODIUM ACETATE, POTASSIUM CHLORIDE, MAGNESIUM CHLORIDE, SODIUM PHOSPHATE, DIBASIC, AND POTASSIUM PHOSPHATE 75 ML/HR: .53; .5; .37; .037; .03; .012; .00082 INJECTION, SOLUTION INTRAVENOUS at 16:41

## 2023-06-18 RX ADMIN — HEPARIN SODIUM 4000 UNITS: 1000 INJECTION INTRAVENOUS; SUBCUTANEOUS at 14:20

## 2023-06-18 RX ADMIN — CHLORHEXIDINE GLUCONATE 15 ML: 1.2 SOLUTION ORAL at 20:19

## 2023-06-18 RX ADMIN — SODIUM CHLORIDE, SODIUM GLUCONATE, SODIUM ACETATE, POTASSIUM CHLORIDE, MAGNESIUM CHLORIDE, SODIUM PHOSPHATE, DIBASIC, AND POTASSIUM PHOSPHATE 1000 ML: .53; .5; .37; .037; .03; .012; .00082 INJECTION, SOLUTION INTRAVENOUS at 20:18

## 2023-06-18 RX ADMIN — ALBUMIN (HUMAN) 12.5 G: 12.5 INJECTION, SOLUTION INTRAVENOUS at 20:18

## 2023-06-18 RX ADMIN — TICAGRELOR 180 MG: 90 TABLET ORAL at 14:19

## 2023-06-18 RX ADMIN — SODIUM CHLORIDE 1000 ML: 0.9 INJECTION, SOLUTION INTRAVENOUS at 14:42

## 2023-06-18 RX ADMIN — FENTANYL CITRATE 50 MCG: 50 INJECTION, SOLUTION INTRAMUSCULAR; INTRAVENOUS at 14:38

## 2023-06-18 NOTE — ED ATTENDING ATTESTATION
6/18/2023  IJian DO, saw and evaluated the patient  I have discussed the patient with the resident/non-physician practitioner and agree with the resident's/non-physician practitioner's findings, Plan of Care, and MDM as documented in the resident's/non-physician practitioner's note, except where noted  All available labs and Radiology studies were reviewed  I was present for key portions of any procedure(s) performed by the resident/non-physician practitioner and I was immediately available to provide assistance  At this point I agree with the current assessment done in the Emergency Department  I have conducted an independent evaluation of this patient a history and physical is as follows:    78-year-old male coming into the ED for evaluation of chest pain, back pain with vomiting and diaphoresis  This occurred about an hour and a half ago while he was working on his roof  He also feels a pain in his shoulders  No prior cardiac history, although he does have hypertension, also has a strong family history  For example his sister just had a CABG per the patient's spouse at bedside  PE:  The patient is ill appearing, diaphoretic  Head: normocephalic, atraumatic  HEENT: mucous membranes moist   Lungs: CTA b/l, no resp distress  Heart: RRR  No M/R/G  Abdomen: NT, ND, no R/R/G  Neuro: CN2-12 intact, GCS 15  Normal strength and sensation, normal speech and gait  Cap refill < 2 sec, skin warm and dry  No rashes or lesions  2+ peripheral pulses  Moving all extremities equally  EKG: + Anterolateral STEMI  Interventional cardiology alerted, Dr Waqas Sharpe, Code STEMI called, will take to cath lab        ED Course         Critical Care Time  CriticalCare Time    Date/Time: 6/18/2023 11:17 PM    Performed by: Jian Chauhan DO  Authorized by: Jian Chauhan DO    Critical care provider statement:     Critical care time (minutes):  35    Critical care was necessary to treat or prevent imminent or life-threatening deterioration of the following conditions:  Cardiac failure (STEMI, sent to cath lab emergently)    Critical care was time spent personally by me on the following activities:  Obtaining history from patient or surrogate, development of treatment plan with patient or surrogate, discussions with consultants, discussions with primary provider, examination of patient, evaluation of patient's response to treatment, review of old charts, re-evaluation of patient's condition, ordering and review of radiographic studies, ordering and review of laboratory studies and ordering and performing treatments and interventions    I assumed direction of critical care for this patient from another provider in my specialty: no

## 2023-06-18 NOTE — CONSULTS
Consultation - Cardiology   Matthew Swartz 65 y o  male MRN: [id number]  Unit/Bed#: ED-27 Encounter: [id number]        Assessment:     Anterior wall STEMI  Hypertension -intermittently mildly elevated in the outpatient setting, not on any treatment  Mixed hypercholesterolemia, primarily low HDL and elevated triglyceride  CKD 3, baseline creatinine 1 35-1 43, currently 1 76    Plan:     Emergent cardiac catheterization, risks and benefits described, in particular risk for contrast-induced nephropathy considering his baseline CKD  He has received 200 cc normal saline  No dye allergy  Patient and wife at the bedside agreeable to proceeding with cardiac catheterization  We will obtain echocardiogram  We will admit to the ICU    History of Present Illness   Physician Requesting Consult: Garima Jones DO  Reason for Consult / Principal Problem: Anterior wall STEMI  HPI: Matthew Swartz is a 65y o  year old male who presents with intermittent mild hypertension untreated, mixed hyperlipidemia with low HDL and elevated triglycerides, obesity, CKD stage III presents with back pain, bilateral shoulder pain, while working on the roof, which has been unrelenting, presented to the ER with evidence of diffuse anterior ST elevation V1 to V6  He has baseline CKD, creatinine around 1 4, today 1 76  He presented with elevated blood pressures, he is received heparin, Brilinta, aspirin, nitro, fentanyl  He received 200 cc normal saline  He has ongoing back pain  Review of Systems   Constitutional: Negative  HENT: Negative  Eyes: Negative  Respiratory: Negative  Cardiovascular: Negative  Gastrointestinal: Negative  Endocrine: Negative  Genitourinary: Negative  Musculoskeletal: Positive for arthralgias and back pain  Skin: Negative  Neurological: Negative  Hematological: Negative  Psychiatric/Behavioral: Negative          Historical Information   Past Medical History:   Diagnosis Date   • Back ache    • Hemorrhoids    • Hypertension    • Kidney stone    • Kidney stones      Past Surgical History:   Procedure Laterality Date   • NO PAST SURGERIES       Social History     Substance and Sexual Activity   Alcohol Use Not Currently   • Alcohol/week: 3 0 standard drinks of alcohol   • Types: 3 Cans of beer per week    Comment: 2-3 beers a week     Social History     Substance and Sexual Activity   Drug Use Never     Social History     Tobacco Use   Smoking Status Never   Smokeless Tobacco Never     Family History   Problem Relation Age of Onset   • Diabetes Mother    • No Known Problems Father    • Diabetes Sister    • Diabetic kidney disease Sister    • Stroke Sister    • Hyperlipidemia Brother        Allergies:   Allergies   Allergen Reactions   • Turmeric - Food Allergy Anaphylaxis   • Niacin Other (See Comments)     Other reaction(s): NIACIN (NIACIN) (facial flushing)   • Fenofibrate Dizziness     Other reaction(s): dizziness       Medications:   Scheduled Meds:  Current Facility-Administered Medications   Medication Dose Route Frequency Provider Last Rate   • aspirin    Code/Trauma/Sedation Med Garima Jones DO     • fentanyl citrate (PF)    Code/Trauma/Sedation Med Logan Davis MD     • heparin (porcine)   Intravenous Code/Trauma/Sedation Med Garima Jones DO     • nitroglycerin    Code/Trauma/Sedation Med Garima Jones DO     • sodium chloride   Intravenous Code/Trauma/Sedation Continuous Med Garima Jones DO 1,000 mL (06/18/23 1442)   • ticagrelor    Code/Trauma/Sedation Med Georges Pritchett DO       Continuous Infusions:sodium chloride, , Last Rate: 1,000 mL (06/18/23 1442)      PRN Meds:  aspirin, , Code/Trauma/Sedation Med  fentanyl citrate (PF), , Code/Trauma/Sedation Med  heparin (porcine), , Code/Trauma/Sedation Med  nitroglycerin, , Code/Trauma/Sedation Med  sodium chloride, , Code/Trauma/Sedation Continuous Med  ticagrelor, , Code/Trauma/Sedation Med        Objective:   Vitals: Vitals:    06/18/23 1450   BP: 141/95   Pulse: 61   Resp: 16   Temp:    SpO2: 99%     Body mass index is 30 18 kg/m²  Orthostatic Blood Pressures    Flowsheet Row Most Recent Value   Blood Pressure 141/95 filed at 06/18/2023 1450   Patient Position - Orthostatic VS Lying filed at 06/18/2023 4958        Systolic (49THA), KNZ:655 , Min:126 , MYT:116     Diastolic (51TXQ), TLX:99, Min:84, Max:105    No intake or output data in the 24 hours ending 06/18/23 1452  Weight (last 2 days)     Date/Time Weight    06/18/23 14:17:08 88 7 (195 55)        Invasive Devices     None                 Physical Exam  Constitutional:       General: He is not in acute distress  Appearance: Normal appearance  He is well-developed  He is not diaphoretic  HENT:      Head: Normocephalic and atraumatic  Eyes:      General: No scleral icterus  Conjunctiva/sclera: Conjunctivae normal       Pupils: Pupils are equal, round, and reactive to light  Neck:      Thyroid: No thyromegaly  Vascular: No JVD  Cardiovascular:      Rate and Rhythm: Normal rate and regular rhythm  Heart sounds: Normal heart sounds  No murmur heard  No friction rub  No gallop  Pulmonary:      Effort: Pulmonary effort is normal  No respiratory distress  Breath sounds: Normal breath sounds  No wheezing or rales  Abdominal:      General: Bowel sounds are normal  There is no distension  Palpations: Abdomen is soft  Tenderness: There is no abdominal tenderness  Musculoskeletal:         General: No deformity  Normal range of motion  Cervical back: Normal range of motion and neck supple  Right lower leg: No edema  Left lower leg: No edema  Skin:     General: Skin is warm and dry  Findings: No erythema or rash  Neurological:      General: No focal deficit present  Mental Status: He is alert and oriented to person, place, and time  Cranial Nerves: No cranial nerve deficit  Motor: No weakness  "        Labs:       CBC with diff:   Results from last 7 days   Lab Units 06/18/23  1422   WBC Thousand/uL 10 55*   HEMOGLOBIN g/dL 17 0   HEMATOCRIT % 49 5*   MCV fL 91   PLATELETS Thousands/uL 205   RBC Million/uL 5 47   MCH pg 31 1   MCHC g/dL 34 3   RDW % 11 9   MPV fL 10 2   NRBC AUTO /100 WBCs 0     CMP:  Results from last 7 days   Lab Units 06/18/23  1422   SODIUM mmol/L 141   POTASSIUM mmol/L 4 5   CHLORIDE mmol/L 105   CO2 mmol/L 29   BUN mg/dL 26*   CREATININE mg/dL 1 76*   CALCIUM mg/dL 9 3   AST U/L 35   ALT U/L 46   ALK PHOS U/L 91   EGFR ml/min/1 73sq m 42     NT-proBNP: No results found for: \"NTBNP\"   Magnesium:    Coags:    TSH:     Hemoglobin A1C:    Lipid Profile:   No results found for: \"CHOL\"  Lab Results   Component Value Date    HDL 33 (L) 05/24/2022     Lab Results   Component Value Date    LDLCALC 88 05/24/2022     Lab Results   Component Value Date    TRIG 273 (H) 05/24/2022       Imaging & Testing   Cardiac testing:   EKG reviewed personally: Sinus rhythm, diffuse tombstone anterior ST elevation V1 to V6, second EKG shows right bundle branch block with diffuse ST elevation  Telemetry reviewed personally: Sinus rhythm with V1 ST elevation  No results found for this or any previous visit  No results found for this or any previous visit  No results found for this or any previous visit  No results found for this or any previous visit  Imaging:   I have personally reviewed pertinent reports  No results found  Amy Rodriguez MD    Portions of the record may have been created with voice recognition software  Occasional wrong word or \"sound a like\" substitutions may have occurred due to the inherent limitations of voice recognition software  Read the chart carefully and recognize, using context, where substitutions have occurred      "

## 2023-06-18 NOTE — ASSESSMENT & PLAN NOTE
"Patient is a 67-year-old male with PMH of triglyceridemia, 3A CKD, who presented to the ED on 6/18 complaining of sudden onset nausea, vomiting, back pain, feelings of muscle \"stiffness\", and left arm pain  Patient stated he had been working on his roof when he experienced sudden onset of above symptoms which he initially attributed to muscle strain  Patient presented to ED for assessment, where EKG demonstrated ST segment elevation in leads V1 through V6 consistent with an anterior STEMI  Patient was transferred emergently to Cath Lab, catheterization demonstrated 100% occlusion of LAD with angioplasty x2 and stent placement    Patient was transferred to ICU for management of acute STEMI status post PCI    Plan:  · Begin dual antiplatelet therapy ASA 81 and Brilinta 90 mg twice daily tomorrow morning  · Begin high-dose statin therapy  · A m  echo  · Telemetry  · Repeat EKG if any new episodes of chest pain  · Nitrostat as needed for chest pain  "

## 2023-06-18 NOTE — H&P
"[facility]  H&P  Name: Inge Alcala 65 y o  male I MRN: [id number]  Unit/Bed#: ICU 10 I Date of Admission: 6/18/2023   Date of Service: 6/18/2023 I Hospital Day: 0      Assessment/Plan   * Acute ST elevation myocardial infarction (STEMI) due to occlusion of left anterior descending (LAD) coronary artery [facility])  Assessment & Plan  Patient is a 52-year-old male with PMH of triglyceridemia, 3A CKD, who presented to the ED on 6/18 complaining of sudden onset nausea, vomiting, back pain, feelings of muscle \"stiffness\", and left arm pain  Patient stated he had been working on his roof when he experienced sudden onset of above symptoms which he initially attributed to muscle strain  Patient presented to ED for assessment, where EKG demonstrated ST segment elevation in leads V1 through V6 consistent with an anterior STEMI  Patient was transferred emergently to Cath Lab, catheterization demonstrated 100% occlusion of LAD with angioplasty x2 and stent placement    Patient was transferred to ICU for management of acute STEMI status post PCI    Plan:  · Begin dual antiplatelet therapy ASA 81 and Brilinta 90 mg twice daily tomorrow morning  · A m  echo  · Telemetry  · Repeat EKG if any new episodes of chest pain  · Nitrostat as needed for chest pain    Mixed hyperlipidemia  Assessment & Plan  Lab Results   Component Value Date    CHOLESTEROL 176 05/24/2022    TRIG 273 (H) 05/24/2022    HDL 33 (L) 05/24/2022    LDLCALC 88 05/24/2022     Consider initiating statin therapy    Stage 3a chronic kidney disease [facility])  Assessment & Plan  Lab Results   Component Value Date    EGFR 42 06/18/2023    EGFR 58 02/28/2023    EGFR 54 05/24/2022    CREATININE 1 76 (H) 06/18/2023    CREATININE 1 35 (H) 02/28/2023    CREATININE 1 43 (H) 05/24/2022     Creatinine slightly elevated from baseline, likely due to acute stress event  Continue IV fluid hydration to avoid contrast-induced nephropathy  Continue to monitor " with daily labs           History of Present Illness     HPI: Josue Atkins is a 65 y o  with PMH of dyslipidemia, CKD 3A, sciatica, prediabetes, hypertension who presents complaining of sudden onset pain in left arm, back pain, nausea, vomiting  Patient stated he was working on his roof when he experienced sudden onset of above symptoms  Patient denied any similar symptoms in the past, denies any overt chest pain but did have nausea with vomiting as well as back pain  Patient presented to ED for assessment  In ED, EKG was taken which demonstrated diffuse ST segment elevation in V1 through V6 consistent with anterior STEMI  STEMI alert was called on patient, patient was loaded with 325 mg aspirin, 100 Brilinta, sublingual nitro, 4 units of heparin, 50 mg of fentanyl  Patient was transferred emergently to cardiac Cath Lab for catheterization    Catheterization demonstrated single-vessel coronary disease with total occlusion of proximal LAD  Balloon angiography x2 with stent placement x1 performed with 0% remaining stenosis  Patient tolerated procedure well was transferred to ICU for management of STEMI status post PCI  History obtained from the patient  Review of Systems   Constitutional: Negative for appetite change, diaphoresis and fatigue  HENT: Negative for ear pain and trouble swallowing  Eyes: Negative for visual disturbance  Respiratory: Negative for apnea, cough and shortness of breath  Cardiovascular: Negative for chest pain and palpitations  Gastrointestinal: Negative for abdominal distention, constipation, diarrhea, nausea and vomiting  Endocrine: Negative for polydipsia and polyphagia  Genitourinary: Negative for dysuria and urgency  Musculoskeletal: Negative for arthralgias and myalgias  Skin: Negative for pallor and rash  Neurological: Negative for dizziness and syncope  Hematological: Negative for adenopathy  Psychiatric/Behavioral: Negative for agitation  Historical Information   Past Medical History:  No date: Back ache  No date: Hemorrhoids  No date: Hypertension  No date: Kidney stone  No date: Kidney stones Past Surgical History:  No date: NO PAST SURGERIES   Current Outpatient Medications   Medication Instructions   • Diclofenac Sodium (VOLTAREN) 2 g, Topical, 4 times daily   • Icosapent Ethyl (VASCEPA) 1 g, Oral, 2 times daily   • methocarbamol (ROBAXIN) 500 mg, Oral, 4 times daily PRN   • Multiple Vitamins-Minerals (multivitamin with minerals) tablet 1 tablet, Daily   • NON FORMULARY 1 Dose, Daily    Allergies   Allergen Reactions   • Turmeric - Food Allergy Anaphylaxis   • Niacin Other (See Comments)     Other reaction(s): NIACIN (NIACIN) (facial flushing)   • Fenofibrate Dizziness     Other reaction(s): dizziness      Social History     Tobacco Use   • Smoking status: Never   • Smokeless tobacco: Never   Vaping Use   • Vaping Use: Never used   Substance Use Topics   • Alcohol use: Not Currently     Alcohol/week: 3 0 standard drinks of alcohol     Types: 3 Cans of beer per week     Comment: 2-3 beers a week   • Drug use: Never    Family History   Problem Relation Age of Onset   • Diabetes Mother    • No Known Problems Father    • Diabetes Sister    • Diabetic kidney disease Sister    • Stroke Sister    • Hyperlipidemia Brother           Objective                            Vitals I/O      Most Recent Min/Max in 24hrs   Temp 97 6 °F (36 4 °C) Temp  Min: 97 4 °F (36 3 °C)  Max: 97 6 °F (36 4 °C)   Pulse (!) 48 Pulse  Min: 44  Max: 74   Resp 15 Resp  Min: 12  Max: 18   /68 BP  Min: 100/67  Max: 166/105   O2 Sat 97 % SpO2  Min: 97 %  Max: 99 %    No intake or output data in the 24 hours ending 06/18/23 1658      Diet Cardiovascular; Cardiac     Invasive Monitoring Physical exam    Physical Exam  Vitals and nursing note reviewed  Constitutional:       General: He is not in acute distress  Appearance: He is well-developed     HENT:      Head: Normocephalic and atraumatic  Right Ear: External ear normal       Left Ear: External ear normal       Mouth/Throat:      Mouth: Mucous membranes are moist       Pharynx: Oropharynx is clear  Eyes:      Conjunctiva/sclera: Conjunctivae normal    Cardiovascular:      Rate and Rhythm: Regular rhythm  Bradycardia present  Pulses: Normal pulses  Heart sounds: Normal heart sounds  No murmur heard  No friction rub  No gallop  Pulmonary:      Effort: Pulmonary effort is normal  No respiratory distress  Breath sounds: Normal breath sounds  No wheezing  Abdominal:      General: Abdomen is flat  Palpations: Abdomen is soft  There is no mass  Tenderness: There is no abdominal tenderness  There is no guarding  Musculoskeletal:         General: No swelling  Normal range of motion  Cervical back: Normal range of motion and neck supple  Right lower leg: No edema  Left lower leg: No edema  Skin:     General: Skin is warm and dry  Capillary Refill: Capillary refill takes less than 2 seconds  Neurological:      General: No focal deficit present  Mental Status: He is alert     Psychiatric:         Mood and Affect: Mood normal             Diagnostic Studies      EKG: ST elevation V1 through V6    Imaging: CXR demonstrates no acute cardiopulmonary disease  I have personally reviewed pertinent films in PACS     Medications:  Scheduled PRN   [START ON 6/19/2023] aspirin, 81 mg, Daily  chlorhexidine, 15 mL, Q12H [address]  [START ON 6/19/2023] enoxaparin, 40 mg, Daily  [START ON 6/19/2023] ticagrelor, 90 mg, Q12H [address]      nitroglycerin, 0 4 mg, Q5 Min PRN       Continuous    multi-electrolyte, 75 mL/hr, Last Rate: 75 mL/hr (06/18/23 1641)  sodium chloride, 75 mL/hr         Labs:    CBC    Recent Labs     06/18/23  1422   WBC 10 55*   HGB 17 0   HCT 49 5*        BMP    Recent Labs     06/18/23  1422   SODIUM 141   K 4 5      CO2 29   AGAP 7   BUN 26*   CREATININE 1 76*   CALCIUM 9 3       Coags    No recent results     Additional Electrolytes  No recent results       Blood Gas    No recent results  No recent results LFTs  Recent Labs     06/18/23  1422   ALT 46   AST 35   ALKPHOS 91   ALB 4 6   TBILI 0 81       Infectious  No recent results  Glucose  Recent Labs     06/18/23  1422   GLUC 148*             Critical Care Time Delivered: Upon my evaluation, this patient had a high probability of imminent or life-threatening deterioration due to STEMI status post PCI, which required my direct attention, intervention, and personal management  I have personally provided 30 minutes of critical care time, exclusive of procedures, teaching, family meetings, and any prior time recorded by providers other than myself     Anticipated Length of Stay is > 2 Juanetta Lesches, MD

## 2023-06-18 NOTE — ASSESSMENT & PLAN NOTE
Lab Results   Component Value Date    CHOLESTEROL 176 05/24/2022    TRIG 273 (H) 05/24/2022    HDL 33 (L) 05/24/2022    LDLCALC 88 05/24/2022     Initiate high intensity statin therapy

## 2023-06-18 NOTE — ED PROVIDER NOTES
"History  Chief Complaint   Patient presents with   • Vomiting     Reports vomiting that started this morning  Pt reports almost passing out  Reports feeling \"very stiff\" and fatigued right now  52-year-old male with history of prediabetes and hypertension presents with STEMI  Patient states that he was working on the roof when about an hour and a half ago he developed back pain, nausea, vomiting, left arm pain  History as above  No medications  No previous surgical history  Fmhx of cardiac dz, MI, and  Denies alcohol, tobacco, recreational drugs  History provided by:  Patient and spouse  History limited by:  Acuity of condition   used: No        Prior to Admission Medications   Prescriptions Last Dose Informant Patient Reported? Taking?    Diclofenac Sodium (VOLTAREN) 1 % Not Taking Self No No   Sig: Apply 2 g topically 4 (four) times a day   Patient not taking: Reported on 2/28/2023   Icosapent Ethyl (Vascepa) 1 g CAPS Not Taking Self No No   Sig: Take 1 capsule (1 g total) by mouth 2 (two) times a day   Patient not taking: Reported on 8/23/2022   Multiple Vitamins-Minerals (multivitamin with minerals) tablet Not Taking Self Yes No   Sig: Take 1 tablet by mouth daily   Patient not taking: Reported on 8/23/2022   NON FORMULARY Not Taking Self Yes No   Sig: Take 1 Dose by mouth in the morning RELIEF FACTOR PACK- ANTI-INFLAMATORY   Patient not taking: Reported on 8/23/2022   methocarbamol (ROBAXIN) 500 mg tablet Not Taking Self No No   Sig: Take 1 tablet (500 mg total) by mouth 4 (four) times a day as needed for muscle spasms   Patient not taking: Reported on 2/28/2023      Facility-Administered Medications: None       Past Medical History:   Diagnosis Date   • Back ache    • Hemorrhoids    • Hypertension    • Kidney stone    • Kidney stones        Past Surgical History:   Procedure Laterality Date   • NO PAST SURGERIES         Family History   Problem Relation Age of Onset   • " Diabetes Mother    • No Known Problems Father    • Diabetes Sister    • Diabetic kidney disease Sister    • Stroke Sister    • Hyperlipidemia Brother      I have reviewed and agree with the history as documented  E-Cigarette/Vaping   • E-Cigarette Use Never User      E-Cigarette/Vaping Substances   • Nicotine No    • THC No    • CBD No    • Flavoring No    • Other No    • Unknown No      Social History     Tobacco Use   • Smoking status: Never   • Smokeless tobacco: Never   Vaping Use   • Vaping Use: Never used   Substance Use Topics   • Alcohol use: Not Currently     Alcohol/week: 3 0 standard drinks of alcohol     Types: 3 Cans of beer per week     Comment: 2-3 beers a week   • Drug use: Never        Review of Systems   Constitutional: Negative for activity change, appetite change, chills, diaphoresis, fatigue and fever  Eyes: Negative for visual disturbance  Respiratory: Negative for chest tightness and shortness of breath  Cardiovascular: Negative for chest pain, palpitations and leg swelling  Gastrointestinal: Positive for nausea and vomiting  Negative for abdominal pain, constipation and diarrhea  Musculoskeletal: Positive for back pain  Negative for neck pain and neck stiffness  Skin: Negative for color change and pallor  Neurological: Negative for dizziness, speech difficulty, weakness, numbness and headaches  Psychiatric/Behavioral: Negative for confusion  All other systems reviewed and are negative        Physical Exam  ED Triage Vitals [06/18/23 1406]   Temperature Pulse Respirations Blood Pressure SpO2   (!) 97 4 °F (36 3 °C) 57 18 149/97 99 %      Temp Source Heart Rate Source Patient Position - Orthostatic VS BP Location FiO2 (%)   Oral Monitor Sitting Right arm --      Pain Score       --             Orthostatic Vital Signs  Vitals:    06/18/23 1421 06/18/23 1435 06/18/23 1440 06/18/23 1450   BP: (!) 158/102 126/84 135/90 141/95   Pulse: 74 55 55 61   Patient Position - Orthostatic VS: Lying Lying Lying Lying       Physical Exam  Vitals and nursing note reviewed  Constitutional:       General: He is in acute distress  Appearance: Normal appearance  He is normal weight  He is not ill-appearing, toxic-appearing or diaphoretic  HENT:      Head: Normocephalic and atraumatic  Right Ear: External ear normal       Left Ear: External ear normal       Nose: Nose normal       Mouth/Throat:      Mouth: Mucous membranes are moist       Pharynx: Oropharynx is clear  Eyes:      General: No scleral icterus  Right eye: No discharge  Left eye: No discharge  Extraocular Movements: Extraocular movements intact  Conjunctiva/sclera: Conjunctivae normal    Cardiovascular:      Rate and Rhythm: Normal rate and regular rhythm  Pulses: Normal pulses  Heart sounds: Normal heart sounds  Comments: Radial pulses 2+ bilaterally  Pulmonary:      Effort: Pulmonary effort is normal  No respiratory distress  Breath sounds: Normal breath sounds  No stridor  No wheezing, rhonchi or rales  Abdominal:      General: There is no distension  Palpations: Abdomen is soft  There is no mass  Tenderness: There is no abdominal tenderness  There is no guarding or rebound  Musculoskeletal:         General: No swelling, tenderness, deformity or signs of injury  Normal range of motion  Cervical back: Normal range of motion and neck supple  No rigidity or tenderness  Right lower leg: No edema  Left lower leg: No edema  Skin:     General: Skin is warm and dry  Capillary Refill: Capillary refill takes less than 2 seconds  Coloration: Skin is not jaundiced or pale  Findings: No bruising, erythema, lesion or rash  Neurological:      General: No focal deficit present  Mental Status: He is alert and oriented to person, place, and time  Cranial Nerves: No cranial nerve deficit  Sensory: No sensory deficit        Motor: No weakness  Gait: Gait normal       Comments: -Face symmetrical and tongue midline   Normal speech   -Cranial nerves II-XII intact  -Pronator drift negative  - strength strong and equal bilaterally  -Elbow flexor/extensor strength 5/5 bilaterally  -Sensation to light touch intact over distal arm bilaterally   Psychiatric:         Mood and Affect: Mood normal          Behavior: Behavior normal          ED Medications  Medications   fentanyl citrate (PF) 100 MCG/2ML (25 mcg Intravenous Given 6/18/23 1511)   midazolam (VERSED) injection (1 mg Intravenous Given 6/18/23 1511)   lidocaine (PF) (XYLOCAINE-MPF) 1 % injection (0 5 mL Infiltration Given 6/18/23 1509)   nitroGLYcerin 200 mcg/mL IA bolus (200 mcg Intra-arterial Given 6/18/23 1515)   heparin (porcine) injection (5,000 Units Intravenous Given 6/18/23 1515)   ticagrelor (BRILINTA) tablet (180 mg Oral Given 6/18/23 1419)   aspirin chewable tablet (324 mg Oral Given 6/18/23 1420)   nitroglycerin (NITROSTAT) SL tablet (0 4 mg Sublingual Given 6/18/23 1420)   heparin (porcine) injection (4,000 Units Intravenous Given 6/18/23 1420)   fentanyl citrate (PF) 100 MCG/2ML (  Canceled Entry 6/18/23 1445)   sodium chloride 0 9 % infusion (1,000 mL Intravenous New Bag 6/18/23 1442)       Diagnostic Studies  Results Reviewed     Procedure Component Value Units Date/Time    HS Troponin 0hr (reflex protocol) [[id number]]  (Abnormal) Collected: 06/18/23 1422    Lab Status: Final result Specimen: Blood from Arm, Right Updated: 06/18/23 1449     hs TnI 0hr 60 ng/L     HS Troponin I 2hr [[id number]]     Lab Status: No result Specimen: Blood     Comprehensive metabolic panel [[id number]]  (Abnormal) Collected: 06/18/23 1422    Lab Status: Final result Specimen: Blood from Arm, Right Updated: 06/18/23 1441     Sodium 141 mmol/L      Potassium 4 5 mmol/L      Chloride 105 mmol/L      CO2 29 mmol/L      ANION GAP 7 mmol/L      BUN 26 mg/dL      Creatinine 1 76 mg/dL      Glucose 148 mg/dL      Calcium 9 3 mg/dL      AST 35 U/L      ALT 46 U/L      Alkaline Phosphatase 91 U/L      Total Protein 7 4 g/dL      Albumin 4 6 g/dL      Total Bilirubin 0 81 mg/dL      eGFR 42 ml/min/1 73sq m     Narrative:      [place] guidelines for Chronic Kidney Disease (CKD):   •  Stage 1 with normal or high GFR (GFR > 90 mL/min/1 73 square meters)  •  Stage 2 Mild CKD (GFR = 60-89 mL/min/1 73 square meters)  •  Stage 3A Moderate CKD (GFR = 45-59 mL/min/1 73 square meters)  •  Stage 3B Moderate CKD (GFR = 30-44 mL/min/1 73 square meters)  •  Stage 4 Severe CKD (GFR = 15-29 mL/min/1 73 square meters)  •  Stage 5 End Stage CKD (GFR <15 mL/min/1 73 square meters)  Note: GFR calculation is accurate only with a steady state creatinine    Lipase [[id number]]  (Normal) Collected: 06/18/23 1422    Lab Status: Final result Specimen: Blood from Arm, Right Updated: 06/18/23 1441     Lipase 21 u/L     CBC and differential [[id number]]  (Abnormal) Collected: 06/18/23 1422    Lab Status: Final result Specimen: Blood from Arm, Right Updated: 06/18/23 1427     WBC 10 55 Thousand/uL      RBC 5 47 Million/uL      Hemoglobin 17 0 g/dL      Hematocrit 49 5 %      MCV 91 fL      MCH 31 1 pg      MCHC 34 3 g/dL      RDW 11 9 %      MPV 10 2 fL      Platelets 477 Thousands/uL      nRBC 0 /100 WBCs      Neutrophils Relative 79 %      Immat GRANS % 0 %      Lymphocytes Relative 14 %      Monocytes Relative 6 %      Eosinophils Relative 1 %      Basophils Relative 0 %      Neutrophils Absolute 8 22 Thousands/µL      Immature Grans Absolute 0 04 Thousand/uL      Lymphocytes Absolute 1 51 Thousands/µL      Monocytes Absolute 0 63 Thousand/µL      Eosinophils Absolute 0 11 Thousand/µL      Basophils Absolute 0 04 Thousands/µL     UA (URINE) with reflex to Scope [[id number]]     Lab Status: No result Specimen: Urine                  XR chest 1 view portable   ED Interpretation by Fay Waters MD (06/18 1432) No acute cardiopulmonary process noted  No mediastinal widening  Procedures  ECG 12 Lead Documentation Only    Date/Time: 6/18/2023 2:30 PM    Performed by: Amara Maher MD  Authorized by: Amara Maher MD    Interpretation:     Interpretation comment:  As in ED course  Sent to Dr [place] Score   ASA Scale Assessment E-Suffix to indicate emergency surgery for any class filed at 06/18/2023 1504   Mallampati Classification Class II: soft palate, uvula, fauces visible - No Difficulty filed at 06/18/2023 1504          ED Course  ED Course as of 06/18/23 1519   Sun Jun 18, 2023   1425 EKG normal sinus rhythm rate of 61, normal [place] interval, normal QRS interval, QTc 406, ST elevations in V1 through V6, 1, aVL with ST depressions in 2, 3, aVF (inferior wall)  STEMI alert called and Dr Shannan Bryson notified   0321 Patient given 325 of aspirin, 180 of Brilinta, sublingual nitro, 4000 units of heparin  1433 Repeat EKG at 1431 no significant changes noted with continued anterolateral STEMI with inferior ST depressions  [id number] Patient states increased back pain with noted pursed lip breathing  Reexam with lung sounds clear bilaterally, normal neurological exam including cranial nerves, upper extremity strength, radial pulses 2+ bilaterally  50 of fentanyl given  Medical Decision Making  60-year-old male presents with STEMI  Noted back pain, nausea, vomiting, left arm pain that started an hour and a half prior to arrival   Family history significant for cardiac disease, MI, diabetes  He states he has a history of prediabetes and hypertension  No current medications  Denies any other medical issues, medications  Denies alcohol use, tobacco use, recreational drug use  Hemodynamically stable at this time, placed on pads and cardiac monitor    STEMI alert called, EKG transmitted to cardiology and cath team   Started 325 baby aspirin, 180 Brilinta, sublingual nitro, 4000 units of heparin, 50 mcg of fentanyl  Patient admitted to Cath Lab for further management of STEMI  Amount and/or Complexity of Data Reviewed  Labs: ordered  Radiology: ordered and independent interpretation performed  Risk  OTC drugs  Prescription drug management  Decision regarding hospitalization              Disposition  Final diagnoses:   ST elevation myocardial infarction (STEMI), unspecified artery ([facility] [address] )     Time reflects when diagnosis was documented in both MDM as applicable and the Disposition within this note     Time User Action Codes Description Comment    6/18/2023  2:34 PM Aydin Elise Add [I21 3] ST elevation myocardial infarction (STEMI), unspecified artery ([facility] [address] )     6/18/2023  2:51 PM Vivian Schneider Add [I21 3] STEMI (ST elevation myocardial infarction) [facility])       ED Disposition     ED Disposition   Send to Cath Lab    Condition   --    Date/Time   Sun Jun 18, 2023  2:34 PM    Comment   --         Follow-up Information    None         Current Discharge Medication List      CONTINUE these medications which have NOT CHANGED    Details   Diclofenac Sodium (VOLTAREN) 1 % Apply 2 g topically 4 (four) times a day  Qty: 100 g, Refills: 2    Associated Diagnoses: Back strain, initial encounter; Sciatic pain, right      Icosapent Ethyl (Vascepa) 1 g CAPS Take 1 capsule (1 g total) by mouth 2 (two) times a day  Qty: 60 capsule, Refills: 2    Associated Diagnoses: Hypertriglyceridemia      methocarbamol (ROBAXIN) 500 mg tablet Take 1 tablet (500 mg total) by mouth 4 (four) times a day as needed for muscle spasms  Qty: 28 tablet, Refills: 0    Associated Diagnoses: Back strain, initial encounter; Sciatic pain, right      Multiple Vitamins-Minerals (multivitamin with minerals) tablet Take 1 tablet by mouth daily      NON FORMULARY Take 1 Dose by mouth in the morning RELIEF FACTOR PACK- ANTI-INFLAMATORY           No discharge procedures on file  PDMP Review     None           ED Provider  Attending physically available and evaluated Maria Fernanda Sellar  I managed the patient along with the ED Attending      Electronically Signed by         Vasu Singh MD  06/18/23 [id number]       Vasu Singh MD  06/18/23 0429

## 2023-06-18 NOTE — ASSESSMENT & PLAN NOTE
Lab Results   Component Value Date    EGFR 42 06/18/2023    EGFR 58 02/28/2023    EGFR 54 05/24/2022    CREATININE 1 76 (H) 06/18/2023    CREATININE 1 35 (H) 02/28/2023    CREATININE 1 43 (H) 05/24/2022     Creatinine slightly elevated from baseline, likely due to acute stress event  Continue IV fluid hydration to avoid contrast-induced nephropathy  Continue to monitor with daily labs

## 2023-06-19 ENCOUNTER — APPOINTMENT (INPATIENT)
Dept: NON INVASIVE DIAGNOSTICS | Facility: HOSPITAL | Age: 58
DRG: 247 | End: 2023-06-19
Payer: COMMERCIAL

## 2023-06-19 LAB
ANION GAP SERPL CALCULATED.3IONS-SCNC: 6 MMOL/L (ref 4–13)
AORTIC ROOT: 3.6 CM
APICAL FOUR CHAMBER EJECTION FRACTION: 39 %
ASCENDING AORTA: 3.3 CM
BACTERIA UR QL AUTO: ABNORMAL /HPF
BILIRUB UR QL STRIP: NEGATIVE
BUN SERPL-MCNC: 23 MG/DL (ref 5–25)
CA-I BLD-SCNC: 1.07 MMOL/L (ref 1.12–1.32)
CALCIUM SERPL-MCNC: 8.3 MG/DL (ref 8.4–10.2)
CHLORIDE SERPL-SCNC: 104 MMOL/L (ref 96–108)
CHOLEST SERPL-MCNC: 151 MG/DL
CLARITY UR: CLEAR
CO2 SERPL-SCNC: 29 MMOL/L (ref 21–32)
COLOR UR: ABNORMAL
CREAT SERPL-MCNC: 1.4 MG/DL (ref 0.6–1.3)
E WAVE DECELERATION TIME: 187 MS
ERYTHROCYTE [DISTWIDTH] IN BLOOD BY AUTOMATED COUNT: 12.2 % (ref 11.6–15.1)
EST. AVERAGE GLUCOSE BLD GHB EST-MCNC: 114 MG/DL
FRACTIONAL SHORTENING: 16 (ref 28–44)
GFR SERPL CREATININE-BSD FRML MDRD: 55 ML/MIN/1.73SQ M
GLUCOSE SERPL-MCNC: 157 MG/DL (ref 65–140)
GLUCOSE UR STRIP-MCNC: ABNORMAL MG/DL
HBA1C MFR BLD: 5.6 %
HCT VFR BLD AUTO: 43 % (ref 36.5–49.3)
HDLC SERPL-MCNC: 33 MG/DL
HGB BLD-MCNC: 14.5 G/DL (ref 12–17)
HGB UR QL STRIP.AUTO: NEGATIVE
INTERVENTRICULAR SEPTUM IN DIASTOLE (PARASTERNAL SHORT AXIS VIEW): 1.5 CM
INTERVENTRICULAR SEPTUM: 1.5 CM (ref 0.6–1.1)
KETONES UR STRIP-MCNC: ABNORMAL MG/DL
LAAS-AP2: 16.8 CM2
LAAS-AP4: 15.1 CM2
LDLC SERPL CALC-MCNC: 85 MG/DL (ref 0–100)
LEFT ATRIUM SIZE: 3 CM
LEFT INTERNAL DIMENSION IN SYSTOLE: 3.8 CM (ref 2.1–4)
LEFT VENTRICLE DIASTOLIC VOLUME (MOD BIPLANE): 87 ML
LEFT VENTRICLE SYSTOLIC VOLUME (MOD BIPLANE): 55 ML
LEFT VENTRICULAR INTERNAL DIMENSION IN DIASTOLE: 4.5 CM (ref 3.5–6)
LEFT VENTRICULAR POSTERIOR WALL IN END DIASTOLE: 0.8 CM
LEFT VENTRICULAR STROKE VOLUME: 32 ML
LEUKOCYTE ESTERASE UR QL STRIP: NEGATIVE
LV EF: 37 %
LVSV (TEICH): 32 ML
MAGNESIUM SERPL-MCNC: 1.9 MG/DL (ref 1.9–2.7)
MCH RBC QN AUTO: 30.6 PG (ref 26.8–34.3)
MCHC RBC AUTO-ENTMCNC: 33.7 G/DL (ref 31.4–37.4)
MCV RBC AUTO: 91 FL (ref 82–98)
MITRAL REGURGITATION PEAK VELOCITY: 5.89 M/S
MITRAL VALVE MEAN INFLOW VELOCITY: 4.81 M/S
MITRAL VALVE REGURGITANT PEAK GRADIENT: 139 MMHG
MUCOUS THREADS UR QL AUTO: ABNORMAL
MV E'TISSUE VEL-SEP: 6 CM/S
MV PEAK A VEL: 0.54 M/S
MV PEAK E VEL: 63 CM/S
MV STENOSIS PRESSURE HALF TIME: 54 MS
MV VALVE AREA P 1/2 METHOD: 4.07
NITRITE UR QL STRIP: NEGATIVE
NON-SQ EPI CELLS URNS QL MICRO: ABNORMAL /HPF
NONHDLC SERPL-MCNC: 118 MG/DL
PH UR STRIP.AUTO: 5.5 [PH]
PHOSPHATE SERPL-MCNC: 3.1 MG/DL (ref 2.7–4.5)
PLATELET # BLD AUTO: 182 THOUSANDS/UL (ref 149–390)
PMV BLD AUTO: 10.6 FL (ref 8.9–12.7)
POTASSIUM SERPL-SCNC: 4.2 MMOL/L (ref 3.5–5.3)
PROT UR STRIP-MCNC: ABNORMAL MG/DL
RBC # BLD AUTO: 4.74 MILLION/UL (ref 3.88–5.62)
RBC #/AREA URNS AUTO: ABNORMAL /HPF
RIGHT ATRIUM AREA SYSTOLE A4C: 12.6 CM2
RIGHT VENTRICLE ID DIMENSION: 4.1 CM
SL CV DOP CALC MV VTI RETROGRADE: 216 CM
SL CV LEFT ATRIUM LENGTH A2C: 5.1 CM
SL CV LV EF: 35
SL CV MV MEAN GRADIENT RETROGRADE: 100 MMHG
SL CV PED ECHO LEFT VENTRICLE DIASTOLIC VOLUME (MOD BIPLANE) 2D: 94 ML
SL CV PED ECHO LEFT VENTRICLE SYSTOLIC VOLUME (MOD BIPLANE) 2D: 62 ML
SODIUM SERPL-SCNC: 139 MMOL/L (ref 135–147)
SP GR UR STRIP.AUTO: 1.05 (ref 1–1.03)
TR MAX PG: 25 MMHG
TR PEAK VELOCITY: 2.5 M/S
TRICUSPID ANNULAR PLANE SYSTOLIC EXCURSION: 2.1 CM
TRICUSPID VALVE PEAK REGURGITATION VELOCITY: 2.52 M/S
TRIGL SERPL-MCNC: 166 MG/DL
UROBILINOGEN UR STRIP-ACNC: <2 MG/DL
WBC # BLD AUTO: 11.74 THOUSAND/UL (ref 4.31–10.16)
WBC #/AREA URNS AUTO: ABNORMAL /HPF

## 2023-06-19 PROCEDURE — 99232 SBSQ HOSP IP/OBS MODERATE 35: CPT | Performed by: INTERNAL MEDICINE

## 2023-06-19 PROCEDURE — 85027 COMPLETE CBC AUTOMATED: CPT

## 2023-06-19 PROCEDURE — 83036 HEMOGLOBIN GLYCOSYLATED A1C: CPT | Performed by: PHYSICIAN ASSISTANT

## 2023-06-19 PROCEDURE — 84100 ASSAY OF PHOSPHORUS: CPT

## 2023-06-19 PROCEDURE — 82330 ASSAY OF CALCIUM: CPT

## 2023-06-19 PROCEDURE — 93306 TTE W/DOPPLER COMPLETE: CPT | Performed by: INTERNAL MEDICINE

## 2023-06-19 PROCEDURE — 83735 ASSAY OF MAGNESIUM: CPT

## 2023-06-19 PROCEDURE — 93306 TTE W/DOPPLER COMPLETE: CPT

## 2023-06-19 PROCEDURE — 93005 ELECTROCARDIOGRAM TRACING: CPT

## 2023-06-19 PROCEDURE — 80048 BASIC METABOLIC PNL TOTAL CA: CPT

## 2023-06-19 PROCEDURE — 80061 LIPID PANEL: CPT | Performed by: PHYSICIAN ASSISTANT

## 2023-06-19 PROCEDURE — 81001 URINALYSIS AUTO W/SCOPE: CPT

## 2023-06-19 RX ORDER — METOPROLOL SUCCINATE 25 MG/1
25 TABLET, EXTENDED RELEASE ORAL DAILY
Status: DISCONTINUED | OUTPATIENT
Start: 2023-06-19 | End: 2023-06-19

## 2023-06-19 RX ORDER — MAGNESIUM SULFATE 1 G/100ML
1 INJECTION INTRAVENOUS ONCE
Status: COMPLETED | OUTPATIENT
Start: 2023-06-19 | End: 2023-06-19

## 2023-06-19 RX ORDER — LORAZEPAM 2 MG/ML
0.5 INJECTION INTRAMUSCULAR ONCE
Status: COMPLETED | OUTPATIENT
Start: 2023-06-19 | End: 2023-06-19

## 2023-06-19 RX ORDER — CALCIUM GLUCONATE 20 MG/ML
1 INJECTION, SOLUTION INTRAVENOUS ONCE
Status: COMPLETED | OUTPATIENT
Start: 2023-06-19 | End: 2023-06-19

## 2023-06-19 RX ORDER — METOPROLOL SUCCINATE 25 MG/1
12.5 TABLET, EXTENDED RELEASE ORAL DAILY
Status: DISCONTINUED | OUTPATIENT
Start: 2023-06-19 | End: 2023-06-20 | Stop reason: HOSPADM

## 2023-06-19 RX ADMIN — CALCIUM GLUCONATE 1 G: 20 INJECTION, SOLUTION INTRAVENOUS at 05:25

## 2023-06-19 RX ADMIN — TICAGRELOR 90 MG: 90 TABLET ORAL at 21:19

## 2023-06-19 RX ADMIN — PERFLUTREN 0.5 ML/MIN: 6.52 INJECTION, SUSPENSION INTRAVENOUS at 08:30

## 2023-06-19 RX ADMIN — METOPROLOL SUCCINATE 12.5 MG: 25 TABLET, EXTENDED RELEASE ORAL at 11:43

## 2023-06-19 RX ADMIN — MAGNESIUM SULFATE HEPTAHYDRATE 1 G: 1 INJECTION, SOLUTION INTRAVENOUS at 07:50

## 2023-06-19 RX ADMIN — TICAGRELOR 90 MG: 90 TABLET ORAL at 08:42

## 2023-06-19 RX ADMIN — ENOXAPARIN SODIUM 40 MG: 40 INJECTION SUBCUTANEOUS at 08:42

## 2023-06-19 RX ADMIN — ASPIRIN 81 MG: 81 TABLET, COATED ORAL at 08:42

## 2023-06-19 RX ADMIN — ATORVASTATIN CALCIUM 80 MG: 40 TABLET, FILM COATED ORAL at 21:19

## 2023-06-19 RX ADMIN — LORAZEPAM 0.5 MG: 2 INJECTION INTRAMUSCULAR; INTRAVENOUS at 02:13

## 2023-06-19 RX ADMIN — CHLORHEXIDINE GLUCONATE 15 ML: 1.2 SOLUTION ORAL at 08:42

## 2023-06-19 NOTE — UTILIZATION REVIEW
"Initial Clinical Review    Admission: Date/Time/Statement:   Admission Orders (From admission, onward)     Ordered        06/18/23 1632  Inpatient Admission  Once                      Orders Placed This Encounter   Procedures   • Inpatient Admission     Standing Status:   Standing     Number of Occurrences:   1     Order Specific Question:   Level of Care     Answer:   Level 1 Stepdown [13]     Order Specific Question:   Estimated length of stay     Answer:   More than 2 Midnights     Order Specific Question:   Certification     Answer:   I certify that inpatient services are medically necessary for this patient for a duration of greater than two midnights  See H&P and MD Progress Notes for additional information about the patient's course of treatment  ED Arrival Information     Expected   -    Arrival   6/18/2023 13:41    Acuity   Immediate            Means of arrival   Walk-In    Escorted by   Spouse    Service   Critical Care/ICU    Admission type   Emergency            Arrival complaint   Vomiting/Weakness           Chief Complaint   Patient presents with   • Vomiting     Reports vomiting that started this morning  Pt reports almost passing out  Reports feeling \"very stiff\" and fatigued right now  Initial Presentation: 65 y o  male with PMH of dyslipidemia, CKD 3A, sciatica, prediabetes and HTN who presents to the ED from home for evaluation of sudden onset pain in left arm, back pain, nausea, 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  Patient denied any similar symptoms in the past, denies any overt chest pain  ED EKG  demonstrated diffuse ST segment elevation in V1 through V6 consistent with anterior STEMI  STEMI alert was called  patient was loaded with 325 mg aspirin, 100 Brilinta, sublingual nitro, 4 units of heparin, 50 mg of fentanyl and transferred emergently to the cath lab  Cardiac cath  demonstrated single-vessel coronary disease with total occlusion of proximal LAD    Balloon angiography " x2 with stent placement x1 performed with 0% remaining stenosis  Patient tolerated procedure well was transferred to ICU for management of STEMI status post PCI  Cardiology consult:  65y o  year old male who presents with intermittent mild hypertension untreated, mixed hyperlipidemia with low HDL and elevated triglycerides, obesity, CKD stage III presents with back pain, bilateral shoulder pain, while working on the roof, which has been unrelenting, presented to the ER with evidence of diffuse anterior ST elevation V1 to V6  He has baseline CKD, creatinine around 1 4, today 1 76  He presented with elevated blood pressures, he is received heparin, Brilinta, aspirin, nitro, fentanyl  He received 200 cc normal saline  He has ongoing back pain  Emergent cardiac cath       6/18 Inpatient admission for acute STEMI s/p PCI:  Telemetry, ECHO in a m , NTG PRN for chest pain, begin DAPT ASA 81 mg and Brilinta 90 mg BID tomorrow morning  Consider initiating statin therapy  Creatinine slightly elevated from baseline (1 3-1 4), continue IVF, monitor with daily labs  6/19 Critical Care:  Palpitations overnight with PVDx on telemetry  Ativan given x2  Reperfusion arrhythmias with early bundle branch block  Follow ECHO  Begin DAPT, high dose statin therapy  Cardiology:  Anterior lateral STEMI - Associated with an occluded LAD  Status post PCI and stenting with good result  No other significant CAD  We will review his echocardiogram   Nonsustained VT/idioventricular rhythm - Associated with both his acute MI and reperfusion injury    Continue dual antiplatelet therapy, statin and we will start low-dose beta-blocker          ED Triage Vitals   Temperature Pulse Respirations Blood Pressure SpO2   06/18/23 1406 06/18/23 1406 06/18/23 1406 06/18/23 1406 06/18/23 1406   (!) 97 4 °F (36 3 °C) 57 18 149/97 99 %      Temp Source Heart Rate Source Patient Position - Orthostatic VS BP Location FiO2 (%)   06/18/23 1406 06/18/23 1406 06/18/23 1406 06/18/23 1406 --   Oral Monitor Sitting Right arm       Pain Score       06/18/23 1600       No Pain          Wt Readings from Last 1 Encounters:   06/19/23 92 5 kg (204 lb)     Additional Vital Signs:     Date/Time Pulse Resp BP MAP (mmHg) SpO2 O2 Device   06/19/23 1143 73 -- 134/92 -- -- --   06/19/23 1100 67 23 Abnormal  134/92 109 97 % None (Room air)   06/19/23 0730 61 -- 137/89 -- -- --   06/19/23 0700 56 15 137/89 108 98 % None (Room air)   06/19/23 0300 62 25 Abnormal  138/90 110 97 % None (Room air)   06/19/23 0209 60 16 141/89 111 95 % None (Room air)   06/19/23 0200 -- -- -- -- -- --   06/18/23 2330 61 14 -- -- 96 % --   06/18/23 2315 67 18 124/86 101 97 % --   06/18/23 2300 63 24 Abnormal  118/80 95 97 % --   06/18/23 2230 65 18 121/79 96 98 % --   06/18/23 2215 62 19 112/74 89 97 % --   06/18/23 2200 65 17 112/74 89 97 % --   06/18/23 2045 57 17 117/77 93 98 % --   06/18/23 2030 57 20 113/76 90 99 % --   06/18/23 2015 59 17 107/70 85 99 % --   06/18/23 2000 55 15 109/63 79 98 % --   06/18/23 1945 78 18 80/59 Abnormal   66 98 % --      06/18/23 1900 64 20 108/74 87 97 % --   06/18/23 1845 62 20 110/68 83 97 % --   06/18/23 1830 57 18 100/70 81 97 % --   06/18/23 1815 60 19 99/73 82 98 % --   06/18/23 1800 49 Abnormal  10 Abnormal  89/65 Abnormal  73 98 % --   06/18/23 1745 59 18 94/67 77 100 % --   06/18/23 1732 48 Abnormal  14 110/73 85 99 % --   06/18/23 1730 51 Abnormal  14 -- -- 99 % --   06/18/23 1715 51 Abnormal  12 -- -- 100 % --   06/18/23 1700 53 Abnormal  16 -- -- 99 % --   06/18/23 1645 51 Abnormal  13 -- -- 98 % --   06/18/23 1630 48 Abnormal  15 103/68 81 97 % --   06/18/23 1600 44 Abnormal  12 100/67 77 99 % None (Room air)   06/18/23 1450 61 16 141/95 -- 99 % Nasal cannula   06/18/23 1440 55 16 135/90 -- 98 % Nasal cannula   06/18/23 1435 55 16 126/84 -- 97 % Nasal cannula   06/18/23 1421 74 16 158/102 Abnormal  -- 99 % None (Room air)   06/18/23 14:17:08 53 Abnormal  16 166/105 Abnormal  -- 98 % Nasal cannula       Pertinent Labs/Diagnostic Test Results:     6/19 ECHO:    •  Left Ventricle: Left ventricular cavity size is normal  Wall thickness is normal  The left ventricular ejection fraction is 35%  Systolic function is moderately reduced  Diastolic function is moderately abnormal, consistent with grade II (pseudonormal) relaxation  •  The following segments are hypokinetic: mid anterior, mid anteroseptal, mid inferoseptal, mid inferior, apical anterior, apical septal, apical inferior, apical lateral and apex  •  All other segments are normal   •  Mitral Valve: There is mild regurgitation  •  Tricuspid Valve: There is mild regurgitation  •  Pericardium: There is a small pericardial effusion circumferential to the heart  The fluid exhibits no internal echoes      6/18 Cardiac cath: Impression:   Prox LAD lesion is 100% stenosed  Single-vessel coronary disease with a total occlusion of the proximal LAD representing the culprit for the patient's anterior STEMI  Successful IVUS-guided PCI, proximal LAD, with a reduction in stenosis from 100% to 0% following pre-dilation and placement of a 4 0x28mm drug-eluting stent  Mildly elevated LVEDP (20 mmHg)  Plan: DAPT, statin, beta-blocker, echocardiogram, guideline-directed medical therapy if LVEDP is significantly reduced, cardiac rehabilitation         6/18 EKG #3:    Unusual P axis, possible ectopic atrial rhythm  Non-specific intra-ventricular conduction block  Anteroseptal infarct (cited on or before 18-JUN-2023)  Lateral injury pattern   ACUTE MI / STEMI   Abnormal ECG  When compared with ECG of 18-JUN-2023 14:31, (unconfirmed)  Ectopic atrial rhythm has replaced Sinus rhythm  Questionable change in QRS duration  Questionable change in initial forces of Anterior leads      6/18 EKG #2:    Normal sinus rhythm  Low voltage QRS  Anteroseptal infarct (cited on or before 18-JUN-2023)  Lateral injury pattern   ACUTE MI / STEMI   Abnormal ECG  When compared with ECG of 18-JUN-2023 14:12, (unconfirmed)  Sinus rhythm has replaced Ectopic atrial rhythm  Serial changes of Anteroseptal infarct Present    6/18 EKG #1:    Anteroseptal infarct , possibly acute  Lateral injury pattern   ACUTE MI / STEMI   Abnormal ECG  No previous ECGs available    XR chest 1 view portable   ED Interpretation by Sofia Hooker MD (06/18 1432)   No acute cardiopulmonary process noted  No mediastinal widening  Final Result by Grandville Frankel, MD (06/19 2858)      No acute cardiopulmonary disease                 Results from last 7 days   Lab Units 06/19/23  0245 06/18/23  1422   WBC Thousand/uL 11 74* 10 55*   HEMOGLOBIN g/dL 14 5 17 0   HEMATOCRIT % 43 0 49 5*   PLATELETS Thousands/uL 182 205   NEUTROS ABS Thousands/µL  --  8 22*         Results from last 7 days   Lab Units 06/19/23  0245 06/18/23  1422   SODIUM mmol/L 139 141   POTASSIUM mmol/L 4 2 4 5   CHLORIDE mmol/L 104 105   CO2 mmol/L 29 29   ANION GAP mmol/L 6 7   BUN mg/dL 23 26*   CREATININE mg/dL 1 40* 1 76*   EGFR ml/min/1 73sq m 55 42   CALCIUM mg/dL 8 3* 9 3   CALCIUM, IONIZED mmol/L 1 07*  --    MAGNESIUM mg/dL 1 9  --    PHOSPHORUS mg/dL 3 1  --      Results from last 7 days   Lab Units 06/18/23  1422   AST U/L 35   ALT U/L 46   ALK PHOS U/L 91   TOTAL PROTEIN g/dL 7 4   ALBUMIN g/dL 4 6   TOTAL BILIRUBIN mg/dL 0 81         Results from last 7 days   Lab Units 06/19/23  0245 06/18/23  1422   GLUCOSE RANDOM mg/dL 157* 148*         Results from last 7 days   Lab Units 06/19/23  0245   HEMOGLOBIN A1C % 5 6   EAG mg/dl 114         Results from last 7 days   Lab Units 06/18/23  1422   HS TNI 0HR ng/L 60*             Results from last 7 days   Lab Units 06/18/23  1422   LIPASE u/L 21                 Results from last 7 days   Lab Units 06/19/23  0527   CLARITY UA  Clear   COLOR UA  Light Yellow   SPEC GRAV UA  1 046*   PH UA  5 5   GLUCOSE UA mg/dl Trace*   KETONES UA mg/dl 10 (1+)* BLOOD UA  Negative   PROTEIN UA mg/dl Trace*   NITRITE UA  Negative   BILIRUBIN UA  Negative   UROBILINOGEN UA (BE) mg/dl <2 0   LEUKOCYTES UA  Negative   WBC UA /hpf 1-2   RBC UA /hpf None Seen   BACTERIA UA /hpf None Seen   EPITHELIAL CELLS WET PREP /hpf Occasional   MUCUS THREADS  Occasional*             ED Treatment:   Medication Administration from 06/18/2023 1341 to 06/18/2023 1457       Date/Time Order Dose Route Action     06/18/2023 1419 EDT ticagrelor (BRILINTA) tablet 180 mg Oral Given     06/18/2023 1420 EDT aspirin chewable tablet 324 mg Oral Given     06/18/2023 1420 EDT nitroglycerin (NITROSTAT) SL tablet 0 4 mg Sublingual Given     06/18/2023 1420 EDT heparin (porcine) injection 4,000 Units Intravenous Given     06/18/2023 1445 EDT fentanyl citrate (PF) 100 MCG/2ML --  Canceled Entry     06/18/2023 1438 EDT fentanyl citrate (PF) 100 MCG/2ML 50 mcg Intravenous Given     06/18/2023 1442 EDT sodium chloride 0 9 % infusion 1,000 mL Intravenous New Bag        Past Medical History:   Diagnosis Date   • Back ache    • Hemorrhoids    • Hypertension    • Kidney stone    • Kidney stones      Present on Admission:  • Stage 3a chronic kidney disease (HCC)  • Mixed hyperlipidemia      Admitting Diagnosis: Vomiting [R11 10]  STEM[facility] (ST elevation myocardial infarction) [facility] ) [I21 3]  ST elevation myocardial infarction (STEMI), unspecified artery ([facility] 75 ) [I21 3]  Age/Sex: 65 y o  male       Admission Orders: Cardio-Pulmonary monitoring, ECHO, SCD       Scheduled Medications:      aspirin, 81 mg, Oral, Daily  atorvastatin, 80 mg, Oral, HS  chlorhexidine, 15 mL, Mouth/Throat, Q12H JIMMY  enoxaparin, 40 mg, Subcutaneous, Daily  metoprolol succinate, 12 5 mg, Oral, Daily  ticagrelor, 90 mg, Oral, Q12H JIMMY      Continuous IV Infusions:      multi-electrolyte, 75 mL/hr, Intravenous, Continuous      PRN Meds:      acetaminophen, 650 mg, Oral, Q6H PRN  nitroglycerin, 0 4 mg, Sublingual, Q5 Min PRN  ondansetron, 4 mg, Intravenous, Q6H PRN        IP CONSULT TO CARDIOLOGY  IP CONSULT TO CASE MANAGEMENT    Network Utilization Review Department  ATTENTION: Please call with any questions or concerns to [phone] and carefully listen to the prompts so that you are directed to the right person  All voicemails are confidential   Yelena Euceda all requests for admission clinical reviews, approved or denied determinations and any other requests to dedicated fax number below belonging to the campus where the patient is receiving treatment   List of dedicated fax numbers for the Facilities:  [address] DENIALS (Administrative/Medical Necessity) [phone]   [address] (Maternity/NICU/Pediatrics) [phone]   [address] Mireya R[address]os [phone]   [facility] 77 [phone]   [address] [address] [facility] [phone]   1558 [facility] 134 815 [facility] [phone]

## 2023-06-19 NOTE — ASSESSMENT & PLAN NOTE
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 when he experienced sudden onset of above symptoms which he initially attributed to muscle strain  Patient presented to ED for assessment, where EKG demonstrated ST segment elevation in leads V1 through V6 consistent with an anterior STEMI  Patient was transferred emergently to Cath Lab, catheterization demonstrated 100% occlusion of LAD with angioplasty x2 and stent placement  Patient was transferred to ICU for management of acute STEMI status post PCI    OVN 6/18-6/19: Reperfusion arrhythmias with early bundle branch block    Patient given Ativan for anxiety    Plan:  · Begin dual antiplatelet therapy ASA 81 and Brilinta 90 mg twice daily   · Begin high-dose statin therapy  · Echo ordered, follow for completion  · Telemetry  · Repeat EKG if any new episodes of chest pain  · Nitrostat as needed for chest pain  "

## 2023-06-19 NOTE — ASSESSMENT & PLAN NOTE
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 when he experienced sudden onset of above symptoms which he initially attributed to muscle strain  Patient presented to ED for assessment, where EKG demonstrated ST segment elevation in leads V1 through V6 consistent with an anterior STEMI  Patient was transferred emergently to Cath Lab, catheterization demonstrated 100% occlusion of LAD with angioplasty x2 and stent placement  Patient was transferred to ICU for management of acute STEMI status post PCI     OVN 6/18-6/19: Reperfusion arrhythmias with early bundle branch block    Patient given Ativan for anxiety     Plan:  • Begin dual antiplatelet therapy ASA 81 and Brilinta 90 mg twice daily   • Begin high-dose statin therapy  • Echo ordered, follow for completion  • Telemetry  • Repeat EKG if any new episodes of chest pain  • Nitrostat as needed for chest pain  "

## 2023-06-19 NOTE — PLAN OF CARE
Problem: PAIN - ADULT  Goal: Verbalizes/displays adequate comfort level or baseline comfort level  Description: Interventions:  - Encourage patient to monitor pain and request assistance  - Assess pain using appropriate pain scale  - Administer analgesics based on type and severity of pain and evaluate response  - Implement non-pharmacological measures as appropriate and evaluate response  - Consider cultural and social influences on pain and pain management  - Notify physician/advanced practitioner if interventions unsuccessful or patient reports new pain  Outcome: Not Progressing     Problem: SAFETY ADULT  Goal: Patient will remain free of falls  Description: INTERVENTIONS:  - Educate patient/family on patient safety including physical limitations  - Instruct patient to call for assistance with activity   - Consult OT/PT to assist with strengthening/mobility   - Keep Call bell within reach  - Keep bed low and locked with side rails adjusted as appropriate  - Keep care items and personal belongings within reach  - Initiate and maintain comfort rounds  - Make Fall Risk Sign visible to staff  - Offer Toileting every 2 Hours, in advance of need  - Initiate/Maintain bed alarm understands to use call bell        - Apply yellow socks and bracelet for high fall risk patients  - Consider moving patient to room near nurses station  Outcome: Not Progressing

## 2023-06-19 NOTE — ASSESSMENT & PLAN NOTE
Lab Results   Component Value Date    EGFR 55 06/19/2023    EGFR 42 06/18/2023    EGFR 58 02/28/2023    CREATININE 1 40 (H) 06/19/2023    CREATININE 1 76 (H) 06/18/2023    CREATININE 1 35 (H) 02/28/2023     Creatinine now returned to baseline  Continue IV fluid hydration to avoid contrast-induced nephropathy  Continue to monitor with daily labs

## 2023-06-19 NOTE — PLAN OF CARE
Problem: PAIN - ADULT  Goal: Verbalizes/displays adequate comfort level or baseline comfort level  Description: Interventions:  - Encourage patient to monitor pain and request assistance  - Assess pain using appropriate pain scale  - Administer analgesics based on type and severity of pain and evaluate response  - Implement non-pharmacological measures as appropriate and evaluate response  - Consider cultural and social influences on pain and pain management  - Notify physician/advanced practitioner if interventions unsuccessful or patient reports new pain  Outcome: Progressing     Problem: INFECTION - ADULT  Goal: Absence or prevention of progression during hospitalization  Description: INTERVENTIONS:  - Assess and monitor for signs and symptoms of infection  - Monitor lab/diagnostic results  - Monitor all insertion sites, i e  indwelling lines, tubes, and drains  - Monitor endotracheal if appropriate and nasal secretions for changes in amount and color  - [place] appropriate cooling/warming therapies per order  - Administer medications as ordered  - Instruct and encourage patient and family to use good hand hygiene technique  - Identify and instruct in appropriate isolation precautions for identified infection/condition  Outcome: Progressing  Goal: Absence of fever/infection during neutropenic period  Description: INTERVENTIONS:  - Monitor WBC    Outcome: Progressing     Problem: SAFETY ADULT  Goal: Patient will remain free of falls  Description: INTERVENTIONS:  - Educate patient/family on patient safety including physical limitations  - Instruct patient to call for assistance with activity   - Consult OT/PT to assist with strengthening/mobility   - Keep Call bell within reach  - Keep bed low and locked with side rails adjusted as appropriate  - Keep care items and personal belongings within reach  - Initiate and maintain comfort rounds  - Make Fall Risk Sign visible to staff  - Apply yellow socks and bracelet for high fall risk patients  - Consider moving patient to room near nurses station  Outcome: Progressing  Goal: Maintain or return to baseline ADL function  Description: INTERVENTIONS:  -  Assess patient's ability to carry out ADLs; assess patient's baseline for ADL function and identify physical deficits which impact ability to perform ADLs (bathing, care of mouth/teeth, toileting, grooming, dressing, etc )  - Assess/evaluate cause of self-care deficits   - Assess range of motion  - Assess patient's mobility; develop plan if impaired  - Assess patient's need for assistive devices and provide as appropriate  - Encourage maximum independence but intervene and supervise when necessary  - Involve family in performance of ADLs  - Assess for home care needs following discharge   - Consider OT consult to assist with ADL evaluation and planning for discharge  - Provide patient education as appropriate  Outcome: Progressing  Goal: Maintains/Returns to pre admission functional level  Description: INTERVENTIONS:  - Perform BMAT or MOVE assessment daily    - Set and communicate daily mobility goal to care team and patient/family/caregiver     - Collaborate with rehabilitation services on mobility goals if consulted  - Out of bed for toileting  - Record patient progress and toleration of activity level   Outcome: Progressing     Problem: DISCHARGE PLANNING  Goal: Discharge to home or other facility with appropriate resources  Description: INTERVENTIONS:  - Identify barriers to discharge w/patient and caregiver  - Arrange for needed discharge resources and transportation as appropriate  - Identify discharge learning needs (meds, wound care, etc )  - Arrange for interpretive services to assist at discharge as needed  - Refer to Case Management Department for coordinating discharge planning if the patient needs post-hospital services based on physician/advanced practitioner order or complex needs related to functional status, cognitive ability, or social support system  Outcome: Progressing     Problem: Knowledge Deficit  Goal: Patient/family/caregiver demonstrates understanding of disease process, treatment plan, medications, and discharge instructions  Description: Complete learning assessment and assess knowledge base    Interventions:  - Provide teaching at level of understanding  - Provide teaching via preferred learning methods  Outcome: Progressing

## 2023-06-19 NOTE — ASSESSMENT & PLAN NOTE
Component Value Date     CHOLESTEROL 151 06/19/2023     TRIG 166 (H) 06/19/2023     HDL 33 (L) 06/19/2023     LDLCALC 85 06/19/2023      Initiate high intensity statin therapy

## 2023-06-19 NOTE — ASSESSMENT & PLAN NOTE
Lab Results   Component Value Date    CHOLESTEROL 151 06/19/2023    TRIG 166 (H) 06/19/2023    HDL 33 (L) 06/19/2023    LDLCALC 85 06/19/2023     Initiate high intensity statin therapy

## 2023-06-19 NOTE — PROGRESS NOTES
"[facility]  Progress Note  Name: Abner Enamorado  MRN: [id number]  Unit/Bed#: ICU 10 I Date of Admission: 6/18/2023   Date of Service: 6/19/2023 I Hospital Day: 1    Assessment/Plan   * Acute ST elevation myocardial infarction (STEMI) due to occlusion of left anterior descending (LAD) coronary artery [facility])  Assessment & Plan  Patient is a 71-year-old male with PMH of triglyceridemia, 3A CKD, who presented to the ED on 6/18 complaining of sudden onset nausea, vomiting, back pain, feelings of muscle \"stiffness\", and left arm pain  Patient stated he had been working on his roof when he experienced sudden onset of above symptoms which he initially attributed to muscle strain  Patient presented to ED for assessment, where EKG demonstrated ST segment elevation in leads V1 through V6 consistent with an anterior STEMI  Patient was transferred emergently to Cath Lab, catheterization demonstrated 100% occlusion of LAD with angioplasty x2 and stent placement  Patient was transferred to ICU for management of acute STEMI status post PCI    OVN 6/18-6/19: Reperfusion arrhythmias with early bundle branch block    Patient given Ativan for anxiety    Plan:  · Begin dual antiplatelet therapy ASA 81 and Brilinta 90 mg twice daily   · Begin high-dose statin therapy  · Echo ordered, follow for completion  · Telemetry  · Repeat EKG if any new episodes of chest pain  · Nitrostat as needed for chest pain    Mixed hyperlipidemia  Assessment & Plan  Lab Results   Component Value Date    CHOLESTEROL 151 06/19/2023    TRIG 166 (H) 06/19/2023    HDL 33 (L) 06/19/2023    LDLCALC 85 06/19/2023     Initiate high intensity statin therapy    Stage 3a chronic kidney disease [facility])  Assessment & Plan  Lab Results   Component Value Date    EGFR 55 06/19/2023    EGFR 42 06/18/2023    EGFR 58 02/28/2023    CREATININE 1 40 (H) 06/19/2023    CREATININE 1 76 (H) 06/18/2023    CREATININE 1 35 (H) 02/28/2023     Creatinine now " "returned to baseline  Continue IV fluid hydration to avoid contrast-induced nephropathy  Continue to monitor with daily labs             ICU Core Measures     A: Assess, Prevent, and Manage Pain · Has pain been assessed? Yes  · Need for changes to pain regimen? No   B: Both SAT/SAT  · N/A   C: Choice of Sedation · RASS Goal: 0 Alert and Calm or N/A patient not on sedation  · Need for changes to sedation or analgesia regimen? No   D: Delirium · CAM-ICU: Negative   E: Early Mobility  · Plan for early mobility? Yes   F: Family Engagement · Plan for family engagement today? Yes         Prophylaxis:  VTE VTE covered by:  enoxaparin, Subcutaneous       Stress Ulcer  not ordered       Subjective   HPI/24hr events:   Patient is a 76-year-old male with PMH of triglyceridemia, 3A CKD, who presented to the ED on 6/18 complaining of sudden onset nausea, vomiting, back pain, feelings of muscle \"stiffness\", and left arm pain  Patient stated he had been working on his roof when he experienced sudden onset of above symptoms which he initially attributed to muscle strain  Patient presented to ED for assessment, where EKG demonstrated ST segment elevation in leads V1 through V6 consistent with an anterior STEMI  Patient was transferred emergently to Cath Lab, catheterization demonstrated 100% occlusion of LAD with angioplasty x2 and stent placement  Patient was transferred to ICU for management of acute STEMI status post PCI    OVN: Palpitations with PVCs on telemetry, Ativan given x2    Review of Systems   Constitutional: Negative for chills and fever  HENT: Negative for ear pain and sore throat  Eyes: Negative for pain and visual disturbance  Respiratory: Negative for cough and shortness of breath  Cardiovascular: Positive for palpitations  Negative for chest pain  Gastrointestinal: Negative for abdominal pain and vomiting  Genitourinary: Negative for dysuria and hematuria     Musculoskeletal: Negative for " arthralgias and back pain  Skin: Negative for color change and rash  Neurological: Negative for seizures and syncope  All other systems reviewed and are negative  Objective                            Vitals I/O      Most Recent Min/Max in 24hrs   Temp 99 °F (37 2 °C) Temp  Min: 97 4 °F (36 3 °C)  Max: 99 2 °F (37 3 °C)   Pulse 56 Pulse  Min: 44  Max: 78   Resp 15 Resp  Min: 10  Max: 25   /89 BP  Min: 80/59  Max: 166/105   O2 Sat 98 % SpO2  Min: 95 %  Max: 100 %      Intake/Output Summary (Last 24 hours) at 6/19/2023 0721  Last data filed at 6/19/2023 8036  Gross per 24 hour   Intake 2686 25 ml   Output 1050 ml   Net 1636 25 ml         Diet Cardiovascular; Cardiac     Invasive Monitoring Physical exam    Physical Exam  Vitals and nursing note reviewed  Constitutional:       General: He is not in acute distress  Appearance: He is well-developed  HENT:      Head: Normocephalic and atraumatic  Right Ear: External ear normal       Left Ear: External ear normal       Mouth/Throat:      Mouth: Mucous membranes are moist       Pharynx: Oropharynx is clear  Eyes:      Conjunctiva/sclera: Conjunctivae normal    Cardiovascular:      Rate and Rhythm: Regular rhythm  Bradycardia present  Pulses: Normal pulses  Heart sounds: Normal heart sounds  No murmur heard  No friction rub  No gallop  Pulmonary:      Effort: Pulmonary effort is normal  No respiratory distress  Breath sounds: Normal breath sounds  No wheezing  Abdominal:      General: Abdomen is flat  Palpations: Abdomen is soft  There is no mass  Tenderness: There is no abdominal tenderness  There is no guarding  Musculoskeletal:         General: No swelling  Normal range of motion  Cervical back: Normal range of motion and neck supple  Right lower leg: No edema  Left lower leg: No edema  Skin:     General: Skin is warm and dry        Capillary Refill: Capillary refill takes less than 2 seconds  Neurological:      General: No focal deficit present  Mental Status: He is alert  Psychiatric:         Mood and Affect: Mood normal             Diagnostic Studies      EKG: PVCs, wide QRS with early left bundle morphology  Imaging: CXR demonstrated no acute cardiopulmonary anomaly I have personally reviewed pertinent films in PACS     Medications:  Scheduled PRN   aspirin, 81 mg, Daily  atorvastatin, 80 mg, HS  chlorhexidine, 15 mL, Q12H JIMMY  enoxaparin, 40 mg, Daily  magnesium sulfate, 1 g, Once  ticagrelor, 90 mg, Q12H JIMMY      acetaminophen, 650 mg, Q6H PRN  nitroglycerin, 0 4 mg, Q5 Min PRN  ondansetron, 4 mg, Q6H PRN       Continuous    multi-electrolyte, 75 mL/hr, Last Rate: 75 mL/hr (06/18/23 1641)         Labs:    CBC    Recent Labs     06/18/23  1422 06/19/23  0245   WBC 10 55* 11 74*   HGB 17 0 14 5   HCT 49 5* 43 0    182     BMP    Recent Labs     06/18/23  1422 06/19/23  0245   SODIUM 141 139   K 4 5 4 2    104   CO2 29 29   AGAP 7 6   BUN 26* 23   CREATININE 1 76* 1 40*   CALCIUM 9 3 8 3*       Coags    No recent results     Additional Electrolytes  Recent Labs     06/19/23  0245   MG 1 9   PHOS 3 1   CAIONIZED 1 07*          Blood Gas    No recent results  No recent results LFTs  Recent Labs     06/18/23  1422   ALT 46   AST 35   ALKPHOS 91   ALB 4 6   TBILI 0 81       Infectious  No recent results  Glucose  Recent Labs     06/18/23  1422 06/19/23  0245   GLUC 148* 157*               Critical Care Time Delivered: Upon my evaluation, this patient had a high probability of imminent or life-threatening deterioration due to MI status post PCI, which required my direct attention, intervention, and personal management  I have personally provided 30 minutes of critical care time, exclusive of procedures, teaching, family meetings, and any prior time recorded by providers other than myself       Crystal Espinoza MD

## 2023-06-19 NOTE — PROGRESS NOTES
"Cardiology Progress Note - Muna Stoddard 65 y o  male MRN: [id number]    Unit/Bed#: ICU 10 Encounter: [id number]      Assessment:  Principal Problem:    Acute ST elevation myocardial infarction (STEMI) due to occlusion of left anterior descending (LAD) coronary artery (HCC)  Active Problems:    Stage 3a chronic kidney disease (HCC)    Mixed hyperlipidemia      Plan:    1  Anterior lateral STEMI - Associated with an occluded LAD  Status post PCI and stenting with good result  No other significant CAD  We will review his echocardiogram   Continue dual antiplatelet therapy, statin and we will start low-dose beta-blocker as stated below  2   Nonsustained VT/idioventricular rhythm - Associated with both his acute MI and reperfusion injury  His heart rates were running low at baseline but I feel at this point starting low-dose Toprol XL is a good idea we will give this a try  Subjective:   Patient seen and examined  No significant events overnight  Patient overall feeling well today  Had palpitations overnight corresponding with runs of nonsustained VT and idioventricular rhythms  Objective:     Vitals: Blood pressure 137/89, pulse 61, temperature 99 °F (37 2 °C), temperature source Oral, resp  rate 15, height 5' 7\" (1 702 m), weight 92 5 kg (204 lb), SpO2 98 %  , Body mass index is 31 95 kg/m² ,   Orthostatic Blood Pressures    Flowsheet Row Most Recent Value   Blood Pressure 137/89 filed at 06/19/2023 0730   Patient Position - Orthostatic VS Lying filed at 06/19/2023 0700            Intake/Output Summary (Last 24 hours) at 6/19/2023 0959  Last data filed at 6/19/2023 0800  Gross per 24 hour   Intake 3008 75 ml   Output 1050 ml   Net 1958 75 ml       Telemetry review: Sinus rhythm with a competing ectopic atrial rhythm, with runs of nonsustained VT and reperfusion idioventricular rhythms        Physical Exam:    GEN: Muna Stoddard appears well, alert and oriented x 3, pleasant and cooperative " HEENT: pupils equal, round, and reactive to light; extraocular muscles intact  NECK: supple, no carotid bruits   HEART: regular rhythm, normal S1 and S2, no murmurs, clicks, gallops or rubs   LUNGS: clear to auscultation bilaterally; no wheezes, rales, or rhonchi   ABDOMEN: normal bowel sounds, soft, no tenderness, no distention  EXTREMITIES: peripheral pulses normal; no clubbing, cyanosis, or edema  NEURO: no focal findings   SKIN: normal without suspicious lesions on exposed skin    Medications:      Current Facility-Administered Medications:   •  acetaminophen (TYLENOL) tablet 650 mg, 650 mg, Oral, Q6H PRN, Dora Johnson PA-C  •  aspirin (ECOTRIN LOW STRENGTH) EC tablet 81 mg, 81 mg, Oral, Daily, Aaron Fuller MD, 81 mg at 06/19/23 4844  •  atorvastatin (LIPITOR) tablet 80 mg, 80 mg, Oral, HS, Aaron Fuller MD  •  chlorhexidine (PERIDEX) 0 12 % oral rinse 15 mL, 15 mL, Mouth/Throat, Q12H [facility], Rehan Perez, 15 mL at 06/19/23 0842  •  enoxaparin (LOVENOX) subcutaneous injection 40 mg, 40 mg, Subcutaneous, Daily, Rehan Perez, 40 mg at 06/19/23 1670  •  multi-electrolyte (PLASMALYTE-A/ISOLYTE-S PH 7 4) IV solution, 75 mL/hr, Intravenous, Continuous, [place], Last Rate: 75 mL/hr at 06/18/23 1641, 75 mL/hr at 06/18/23 1641  •  nitroglycerin (NITROSTAT) SL tablet 0 4 mg, 0 4 mg, Sublingual, Q5 Min PRN, Herbert Sapp MD  •  [facility]ron [facility] injection 4 mg, 4 mg, Intravenous, Q6H PRN, Dora Johnson PA-C  •  ticagrelor (BRILINTA) tablet 90 mg, 90 mg, Oral, Q12H [facility], Aaron Fuller MD, 90 mg at 06/19/23 0842     Labs & Results:        Results from last 7 days   Lab Units 06/19/23  0245 06/18/23  1422   WBC Thousand/uL 11 74* 10 55*   HEMOGLOBIN g/dL 14 5 17 0   HEMATOCRIT % 43 0 49 5*   PLATELETS Thousands/uL 182 205     Results from last 7 days   Lab Units 06/19/23  0245   TRIGLYCERIDES mg/dL 166*   HDL mg/dL 33*     Results from last 7 days   Lab Units 06/19/23  0245 06/18/23  1422   POTASSIUM mmol/L 4 2 4 5   CHLORIDE mmol/L 104 105   CO2 mmol/L 29 29   BUN mg/dL 23 26*   CREATININE mg/dL 1 40* 1 76*   CALCIUM mg/dL 8 3* 9 3   ALK PHOS U/L  --  91   ALT U/L  --  46   AST U/L  --  35         Results from last 7 days   Lab Units 06/19/23  0245   MAGNESIUM mg/dL 1 9         EKG personally reviewed by Taylor Pepper MD   Sinus rhythm with anterior lateral ST elevations  CARDIAC CATH (6/18/2023):  Coronary Findings    Diagnostic  Dominance: Left    Left Main   The vessel was visualized by angiography, is moderate in size and is angiographically normal       Left Anterior Descending   The vessel is large  Prox LAD lesion is 100% stenosed  Culprit lesion  ADITYA flow is 0  Ultrasound (IVUS) was performed on the Prox LAD  Ramus Intermedius   The vessel is small  There is mild diffuse disease throughout the vessel  Left Circumflex   The vessel is large, is angiographically normal and dominant  Right Coronary Artery   The vessel is small and is angiographically normal       Intervention        Prox LAD lesion   Angioplasty   The balloon used was a Bycler PTCA RX TREK 2 5 X 15MM  Maximum pressure: 8 margaert  Inflation time: 10 sec  Time obtained: 15:28 EDT  First reinflation; Max pressure: 8 margaret  Inflation time 10 sec  Time obtained: 15:28 EDT  Stent   Stent was successfully placed  The stent used was a STENT PABLO SYNERGY XD MR US [id number]  Stent was deployed by way of balloon expansion  Maximum pressure: 14 margaret  Inflation time: 30 sec  Supplies used: GUIDEWIRE RUNTHROUGH 180CM; CATH GUIDE LAUNCHER 6FR EBU 3 5   Angioplasty   Angioplasty was performed following stent deployment  The balloon used was a Bycler TREK NC JF 4 X 20MM RAPD EXCH  Maximum pressure: 14 margaret  Inflation time: 30 sec  Time obtained: 15:41 EDT  First reinflation; Max pressure: 16 margaret  Inflation time 30 sec  Time obtained: 15:42 EDT     Supplies used: GUIDEWIRE RUNTHROUGH 180CM; CATH GUIDE LAUNCHER 6FR EBU 3 5 Post-Intervention Lesion Assessment   Post-intervention ADITYA flow is 3  Ultrasound (IVUS) was performed  Moderate plaque burden was detected  There is a 0% residual stenosis post intervention  Counseling / Coordination of Care  Total floor / unit time spent today 25 minutes  Greater than 50% of total time was spent with the patient and / or family counseling and / or coordination of care

## 2023-06-19 NOTE — PROGRESS NOTES
Patient c/o palpitations on and off and is unable to sleep at this time  Offered water, repositioning, and bathroom  CC Annette MENESES aware

## 2023-06-19 NOTE — QUICK NOTE
Code Status: Level 1 - Full Code  POA:    POLST:      Reason for ICU admission:   STEMI status post PCI    Active problems:   Principal Problem:    Acute ST elevation myocardial infarction (STEMI) due to occlusion of left anterior descending (LAD) coronary artery (HCC)  Active Problems:    Stage 3a chronic kidney disease ([facility] [address] )    Mixed hyperlipidemia  Resolved Problems:    * No resolved hospital problems  *      Consultants:   Cardiology    History of Present Illness:   Agapito Salguero is a 65 y o  with PMH of dyslipidemia, CKD 3A, sciatica, prediabetes, hypertension who presents complaining of sudden onset pain in left arm, back pain, nausea, vomiting  Patient stated he was working on his roof when he experienced sudden onset of above symptoms  Patient denied any similar symptoms in the past, denies any overt chest pain but did have nausea with vomiting as well as back pain  Patient presented to ED for assessment  Summary of clinical course: In ED, EKG was taken which demonstrated diffuse ST segment elevation in V1 through V6 consistent with anterior STEMI  STEMI alert was called on patient, patient was loaded with 325 mg aspirin, 100 Brilinta, sublingual nitro, 4 units of heparin, 50 mg of fentanyl  Patient was transferred emergently to cardiac Cath Lab for catheterization     Catheterization demonstrated single-vessel coronary disease with total occlusion of proximal LAD  Balloon angiography x2 with stent placement x1 performed with 0% remaining stenosis  Patient tolerated procedure well was transferred to ICU for management of STEMI status post PCI  In ICU, patient remained normotensive but had occasional episodes of bradycardia  Telemetry showed occasional runs of NSVT with idioventricular rhythm consistent with MI and reperfusion injury    Patient initiated on DAPT, statin, beta-blockade, stable for discharge to floor with telemetry for titration of medication regimen and close monitoring for "further arrhythmia    Recent or scheduled procedures:   PCI with balloon angiography and stenting of LAD    Outstanding/pending diagnostics:   None    Cultures:   None       Mobilization Plan:   Out of bed to chair    Nutrition Plan:   Cardiac diet    Invasive Devices Review  Invasive Devices     Peripheral Intravenous Line  Duration           Peripheral IV 06/18/23 Distal;Right;Upper;Ventral (anterior) Arm 1 day                Rationale for remaining devices: Further IV medications    VTE Pharmacologic Prophylaxis: Enoxaparin (Lovenox)  VTE Mechanical Prophylaxis: sequential compression device    Discharge Plan:   Patient should be ready for discharge to home on 6/21/2023 after titration of medication regimen and stabilization cardiac rhythm    Initial Physical Therapy Recommendations: None  Initial Occupational Therapy Recommendations: None  Initial /Plan: Home    Home medications that are not reordered and reason why:   None    Spoke with Dr Haritha Alvarez  regarding transfer  Please contact critical care via [employer] with any questions or concerns  Portions of the record may have been created with voice recognition software  Occasional wrong word or \"sound a like\" substitutions may have occurred due to the inherent limitations of voice recognition software  Read the chart carefully and recognize, using context, where substitutions have occurred    "

## 2023-06-20 VITALS
HEIGHT: 67 IN | TEMPERATURE: 98.4 F | RESPIRATION RATE: 25 BRPM | WEIGHT: 204 LBS | SYSTOLIC BLOOD PRESSURE: 115 MMHG | OXYGEN SATURATION: 95 % | DIASTOLIC BLOOD PRESSURE: 74 MMHG | HEART RATE: 70 BPM | BODY MASS INDEX: 32.02 KG/M2

## 2023-06-20 LAB
ANION GAP SERPL CALCULATED.3IONS-SCNC: 6 MMOL/L (ref 4–13)
ATRIAL RATE: 71 BPM
ATRIAL RATE: 75 BPM
ATRIAL RATE: 78 BPM
ATRIAL RATE: 81 BPM
BUN SERPL-MCNC: 18 MG/DL (ref 5–25)
CA-I BLD-SCNC: 1.06 MMOL/L (ref 1.12–1.32)
CALCIUM SERPL-MCNC: 8.2 MG/DL (ref 8.4–10.2)
CHLORIDE SERPL-SCNC: 105 MMOL/L (ref 96–108)
CO2 SERPL-SCNC: 24 MMOL/L (ref 21–32)
CREAT SERPL-MCNC: 1.18 MG/DL (ref 0.6–1.3)
ERYTHROCYTE [DISTWIDTH] IN BLOOD BY AUTOMATED COUNT: 12.1 % (ref 11.6–15.1)
GFR SERPL CREATININE-BSD FRML MDRD: 68 ML/MIN/1.73SQ M
GLUCOSE SERPL-MCNC: 139 MG/DL (ref 65–140)
HCT VFR BLD AUTO: 41.2 % (ref 36.5–49.3)
HGB BLD-MCNC: 14.5 G/DL (ref 12–17)
MAGNESIUM SERPL-MCNC: 2 MG/DL (ref 1.9–2.7)
MCH RBC QN AUTO: 31.7 PG (ref 26.8–34.3)
MCHC RBC AUTO-ENTMCNC: 35.2 G/DL (ref 31.4–37.4)
MCV RBC AUTO: 90 FL (ref 82–98)
P AXIS: 42 DEGREES
PHOSPHATE SERPL-MCNC: 1.6 MG/DL (ref 2.7–4.5)
PLATELET # BLD AUTO: 151 THOUSANDS/UL (ref 149–390)
PMV BLD AUTO: 10.7 FL (ref 8.9–12.7)
POTASSIUM SERPL-SCNC: 3.9 MMOL/L (ref 3.5–5.3)
PR INTERVAL: 138 MS
QRS AXIS: -73 DEGREES
QRS AXIS: -74 DEGREES
QRS AXIS: 259 DEGREES
QRS AXIS: 74 DEGREES
QRSD INTERVAL: 120 MS
QRSD INTERVAL: 126 MS
QRSD INTERVAL: 132 MS
QRSD INTERVAL: 78 MS
QT INTERVAL: 406 MS
QT INTERVAL: 410 MS
QT INTERVAL: 410 MS
QT INTERVAL: 452 MS
QTC INTERVAL: 432 MS
QTC INTERVAL: 445 MS
QTC INTERVAL: 453 MS
QTC INTERVAL: 457 MS
RBC # BLD AUTO: 4.57 MILLION/UL (ref 3.88–5.62)
SODIUM SERPL-SCNC: 135 MMOL/L (ref 135–147)
T WAVE AXIS: 106 DEGREES
T WAVE AXIS: 74 DEGREES
T WAVE AXIS: 91 DEGREES
T WAVE AXIS: 91 DEGREES
VENTRICULAR RATE: 55 BPM
VENTRICULAR RATE: 71 BPM
VENTRICULAR RATE: 75 BPM
VENTRICULAR RATE: 75 BPM
WBC # BLD AUTO: 14.03 THOUSAND/UL (ref 4.31–10.16)

## 2023-06-20 PROCEDURE — 82330 ASSAY OF CALCIUM: CPT

## 2023-06-20 PROCEDURE — 80048 BASIC METABOLIC PNL TOTAL CA: CPT

## 2023-06-20 PROCEDURE — 83735 ASSAY OF MAGNESIUM: CPT

## 2023-06-20 PROCEDURE — 93010 ELECTROCARDIOGRAM REPORT: CPT | Performed by: INTERNAL MEDICINE

## 2023-06-20 PROCEDURE — 99239 HOSP IP/OBS DSCHRG MGMT >30: CPT | Performed by: INTERNAL MEDICINE

## 2023-06-20 PROCEDURE — 85027 COMPLETE CBC AUTOMATED: CPT

## 2023-06-20 PROCEDURE — 84100 ASSAY OF PHOSPHORUS: CPT

## 2023-06-20 RX ORDER — NITROGLYCERIN 0.4 MG/1
0.4 TABLET SUBLINGUAL
Qty: 30 TABLET | Refills: 0 | Status: SHIPPED | OUTPATIENT
Start: 2023-06-20

## 2023-06-20 RX ORDER — ATORVASTATIN CALCIUM 80 MG/1
80 TABLET, FILM COATED ORAL
Qty: 30 TABLET | Refills: 0 | Status: SHIPPED | OUTPATIENT
Start: 2023-06-20 | End: 2023-06-30 | Stop reason: SDUPTHER

## 2023-06-20 RX ORDER — METOPROLOL SUCCINATE 25 MG/1
12.5 TABLET, EXTENDED RELEASE ORAL DAILY
Qty: 30 TABLET | Refills: 0 | Status: SHIPPED | OUTPATIENT
Start: 2023-06-21 | End: 2023-06-30 | Stop reason: SDUPTHER

## 2023-06-20 RX ADMIN — METOPROLOL SUCCINATE 12.5 MG: 25 TABLET, EXTENDED RELEASE ORAL at 08:34

## 2023-06-20 RX ADMIN — TICAGRELOR 90 MG: 90 TABLET ORAL at 08:34

## 2023-06-20 RX ADMIN — ASPIRIN 81 MG: 81 TABLET, COATED ORAL at 08:34

## 2023-06-20 RX ADMIN — ENOXAPARIN SODIUM 40 MG: 40 INJECTION SUBCUTANEOUS at 08:34

## 2023-06-20 NOTE — DISCHARGE SUMMARY
"  Discharge Summary - [facility] Internal Medicine    Patient Information: Yasmany Wagner 65 y o  male MRN: [id number]  Unit/Bed#: ICU 10 Encounter: [id number]    Discharging Physician / Practitioner: Geno Felton MD  PCP: Kaila Richards MD  Admission Date: 6/18/2023  Discharge Date: 06/20/23    Reason for Admission: Vomiting (Reports vomiting that started this morning  Pt reports almost passing out  Reports feeling \"very stiff\" and fatigued right now  )      Discharge Diagnoses:     Primary Discharge Diagnosis:   Principal Problem:    Acute ST elevation myocardial infarction (STEMI) due to occlusion of left anterior descending (LAD) coronary artery (HCC)  Active Problems:    Stage 3a chronic kidney disease (HCC)    Mixed hyperlipidemia  Resolved Problems:    * No resolved hospital problems  *      Consultations During Hospital Stay:  [address] TO CASE MANAGEMENT    Procedures Performed:   · Left Heart Cath  · 2D echo    Significant Findings:   LAD lesion s/p PTCA and stenting  · Refer to hospital course and above listed diagnosis related plan for details    Imaging while in hospital:  2D echo - EF was 35%  GrII DD  Incidental Findings:     Test Results Pending at Discharge (will require follow up):   · As per After Visit Summary     Outpatient Tests Requested:  Complications:  Refer to hospital course and above listed diagnosis related plan, if any    Hospital Course:     Yasmany Wagner is a 65 y o  male patient with PMHx of sciatica, renal cyst, hemorrhoids, but no known history of any coronary artery disease, hypertension who originally presented to the hospital on 6/18/2023 due to chief complaints of chest pain  Patient was found to have ST elevation myocardial infarction  He underwent left heart cath and was found to have LAD lesion status post PTCA and stenting  Patient also had some nonsustained V  tach and was stable on metoprolol eventually    Patient had a " "2D echocardiogram done which showed a EF of 35% with grade 2 diastolic dysfunction  Patient was sent discharged to home on aspirin, Brilinta, statin, and metoprolol  He will follow-up with cardiology in 1 to 2 weeks  Prescriptions were sent to his pharmacy  We will also need to follow-up with PCP in 1 to 2 weeks  Please see above list of diagnoses and related plan for additional information  Condition at Discharge: fair     Discharge Day Visit / Exam:     Subjective:  I have seen and examined the patient at bedside  Overnight events reviewed with the RN  Vitals: Blood Pressure: 115/74 (06/20/23 1052)  Pulse: 70 (06/20/23 0736)  Temperature: 98 4 °F (36 9 °C) (06/20/23 1052)  Temp Source: Oral (06/20/23 1052)  Respirations: (!) 25 (06/20/23 0736)  Height: 5' 7\" (170 2 cm) (06/19/23 0730)  Weight - Scale: 92 5 kg (204 lb) (06/19/23 0730)  SpO2: 95 % (06/20/23 1052)  Exam:   Physical Exam  General Exam: Alert and Oriented x 3, NAD  Eyes: JUNIOR  Neck: Supple  CVS: S1, S2 [place], RRR    R/S: Clear to auscultate anteriorly  Abd: Soft, NT, ND, BS+ve  Extremities: No edema noted  Skin: No acute Rash noted  CNS: No acute FND  Moves all 4 extremities  Psych: Co-operative, Not agitated  Discharge instructions/Information to patient and family:(Discharge Medications and Follow up):   See after visit summary for information provided to patient and family  Provisions for Follow-Up Care:  See after visit summary for information related to follow-up care and any pertinent home health orders  Disposition: Home    Planned Readmission:  No     Discharge Statement:  I spent 35 minutes discharging the patient  This time was spent on the day of discharge  I had direct contact with the patient on the day of discharge   Greater than 50% of the total time was spent examining patient, answering all patient questions, arranging and discussing plan of care with patient as well as directly providing " "post-discharge instructions  Additional time then spent on discharge activities  Discharge Medications:  See after visit summary for reconciled discharge medications provided to patient and family  ** Please Note: \"This note has been constructed using a voice recognition system  Therefore there may be syntax, spelling, and/or grammatical errors  Please call if you have any questions   \"**            "

## 2023-06-20 NOTE — CASE MANAGEMENT
Case Management Discharge Planning Note    Patient name Tre Salazar  Location ICU 10/ICU 10 MRN [id number]  : 1965 Date 2023       Current Admission Date: 2023  Current Admission Diagnosis:Acute ST elevation myocardial infarction (STEMI) due to occlusion of left anterior descending (LAD) coronary artery [facility])   Patient Active Problem List    Diagnosis Date Noted   • Acute ST elevation myocardial infarction (STEMI) due to occlusion of left anterior descending (LAD) coronary artery ([facility] ) 2023   • Back strain 2022   • Sciatic pain, right 2022   • Chronic kidney disease-mineral and bone disorder 2022   • Mixed hyperlipidemia 2022   • Kidney stone 2022   • Elevated bilirubin 2022   • Right flank pain 2022   • Chronic right-sided low back pain without sciatica 2022   • Hyperglyceridemia, pure 2022   • Impaired fasting glucose 2022   • Bilateral renal cysts 2020   • Stage 3a chronic kidney disease ([facility] ) 2018   • Grade II hemorrhoids 2018   • History of colonic polyps 11/15/2013   • History of urinary stone 2013   • Elevated blood-pressure reading without diagnosis of hypertension 2010      LOS (days): 2  Geometric Mean LOS (GMLOS) (days): 2 00  Days to GMLOS:0 2     OBJECTIVE:  Risk of Unplanned Readmission Score: 11 05         Current admission status: Inpatient   Preferred Pharmacy:   [address] [place], [place] [id number]  Phone: [phone] Fax: [phone] [address] [address]  Phone: [phone] Fax: [phone]    Primary Care Provider: Sylvia Simpson MD    Primary Insurance: Benita Thomson  Secondary Insurance:     DISCHARGE DETAILS:    Discharge planning discussed with[de-identified] Patient & Kanna-spouse  Freedom of Choice: Yes  Comments - Freedom of Choice: CM met with Pt and spouse to discuss the Brilinta sent to [facility] for a price check  CM advise the Pt [facility] reported no insurance information being on file  Pt confirmed it was his request for the scripts be sent to [facility], however, it's not his local pharmacy and he will call in his insurance to them now  PT reported he will notify CM if there is an issue with the coverage  CM provided the Pt with a Brilinta coupon card    CM contacted family/caregiver?: Yes  Were Treatment Team discharge recommendations reviewed with patient/caregiver?: Yes  Did patient/caregiver verbalize understanding of patient care needs?: Yes  Were patient/caregiver advised of the risks associated with not following Treatment Team discharge recommendations?: Yes    Contacts  Patient Contacts: Reena Smalls  Relationship to Patient[de-identified] Family  Contact Method: Other (Comment)  Reason/Outcome: Discharge [address]         Is the patient interested in [facility] at discharge?: No    DME Referral Provided  Referral made for DME?: No      Treatment Team Recommendation: Home  Discharge Destination Plan[de-identified] Home  Transport at Discharge : Family

## 2023-06-20 NOTE — PLAN OF CARE
Problem: PAIN - ADULT  Goal: Verbalizes/displays adequate comfort level or baseline comfort level  Description: Interventions:  - Encourage patient to monitor pain and request assistance  - Assess pain using appropriate pain scale  - Administer analgesics based on type and severity of pain and evaluate response  - Implement non-pharmacological measures as appropriate and evaluate response  - Consider cultural and social influences on pain and pain management  - Notify physician/advanced practitioner if interventions unsuccessful or patient reports new pain  Outcome: Progressing     Problem: INFECTION - ADULT  Goal: Absence or prevention of progression during hospitalization  Description: INTERVENTIONS:  - Assess and monitor for signs and symptoms of infection  - Monitor lab/diagnostic results  - Monitor all insertion sites, i e  indwelling lines, tubes, and drains  - Monitor endotracheal if appropriate and nasal secretions for changes in amount and color  - [place] appropriate cooling/warming therapies per order  - Administer medications as ordered  - Instruct and encourage patient and family to use good hand hygiene technique  - Identify and instruct in appropriate isolation precautions for identified infection/condition  Outcome: Progressing  Goal: Absence of fever/infection during neutropenic period  Description: INTERVENTIONS:  - Monitor WBC    Outcome: Progressing     Problem: SAFETY ADULT  Goal: Patient will remain free of falls  Description: INTERVENTIONS:  - Educate patient/family on patient safety including physical limitations  - Instruct patient to call for assistance with activity   - Consult OT/PT to assist with strengthening/mobility   - Keep Call bell within reach  - Keep bed low and locked with side rails adjusted as appropriate  - Keep care items and personal belongings within reach  - Initiate and maintain comfort rounds  - Make Fall Risk Sign visible to staff  - Offer Toileting every  Hours, in advance of need  - Initiate/Maintain alarm  - Obtain necessary fall risk management equipment:   - Apply yellow socks and bracelet for high fall risk patients  - Consider moving patient to room near nurses station  Outcome: Progressing  Goal: Maintain or return to baseline ADL function  Description: INTERVENTIONS:  -  Assess patient's ability to carry out ADLs; assess patient's baseline for ADL function and identify physical deficits which impact ability to perform ADLs (bathing, care of mouth/teeth, toileting, grooming, dressing, etc )  - Assess/evaluate cause of self-care deficits   - Assess range of motion  - Assess patient's mobility; develop plan if impaired  - Assess patient's need for assistive devices and provide as appropriate  - Encourage maximum independence but intervene and supervise when necessary  - Involve family in performance of ADLs  - Assess for home care needs following discharge   - Consider OT consult to assist with ADL evaluation and planning for discharge  - Provide patient education as appropriate  Outcome: Progressing  Goal: Maintains/Returns to pre admission functional level  Description: INTERVENTIONS:  - Perform BMAT or MOVE assessment daily    - Set and communicate daily mobility goal to care team and patient/family/caregiver  - Collaborate with rehabilitation services on mobility goals if consulted  - Perform Range of Motion  times a day  - Reposition patient every  hours    - Dangle patient  times a day  - Stand patient times a day  - Ambulate patient  times a day  - Out of bed to chair  times a day   - Out of bed for meals  times a day  - Out of bed for toileting  - Record patient progress and toleration of activity level   Outcome: Progressing     Problem: DISCHARGE PLANNING  Goal: Discharge to home or other facility with appropriate resources  Description: INTERVENTIONS:  - Identify barriers to discharge w/patient and caregiver  - Arrange for needed discharge resources and transportation as appropriate  - Identify discharge learning needs (meds, wound care, etc )  - Arrange for interpretive services to assist at discharge as needed  - Refer to Case Management Department for coordinating discharge planning if the patient needs post-hospital services based on physician/advanced practitioner order or complex needs related to functional status, cognitive ability, or social support system  Outcome: Progressing     Problem: Knowledge Deficit  Goal: Patient/family/caregiver demonstrates understanding of disease process, treatment plan, medications, and discharge instructions  Description: Complete learning assessment and assess knowledge base    Interventions:  - Provide teaching at level of understanding  - Provide teaching via preferred learning methods  Outcome: Progressing

## 2023-06-20 NOTE — PLAN OF CARE
Problem: PAIN - ADULT  Goal: Verbalizes/displays adequate comfort level or baseline comfort level  Description: Interventions:  - Encourage patient to monitor pain and request assistance  - Assess pain using appropriate pain scale  - Administer analgesics based on type and severity of pain and evaluate response  - Implement non-pharmacological measures as appropriate and evaluate response  - Consider cultural and social influences on pain and pain management  - Notify physician/advanced practitioner if interventions unsuccessful or patient reports new pain  Outcome: Progressing     Problem: INFECTION - ADULT  Goal: Absence or prevention of progression during hospitalization  Description: INTERVENTIONS:  - Assess and monitor for signs and symptoms of infection  - Monitor lab/diagnostic results  - Monitor all insertion sites, i e  indwelling lines, tubes, and drains  - Monitor endotracheal if appropriate and nasal secretions for changes in amount and color  - [place] appropriate cooling/warming therapies per order  - Administer medications as ordered  - Instruct and encourage patient and family to use good hand hygiene technique  - Identify and instruct in appropriate isolation precautions for identified infection/condition  Outcome: Progressing  Goal: Absence of fever/infection during neutropenic period  Description: INTERVENTIONS:  - Monitor WBC    Outcome: Progressing     Problem: SAFETY ADULT  Goal: Patient will remain free of falls  Description: INTERVENTIONS:  - Educate patient/family on patient safety including physical limitations  - Instruct patient to call for assistance with activity   - Consult OT/PT to assist with strengthening/mobility   - Keep Call bell within reach  - Keep bed low and locked with side rails adjusted as appropriate  - Keep care items and personal belongings within reach  - Initiate and maintain comfort rounds  - Make Fall Risk Sign visible to staff  - Offer Toileting every  Hours, in advance of need  - Initiate/Maintain alarm  - Obtain necessary fall risk management equipment:   - Apply yellow socks and bracelet for high fall risk patients  - Consider moving patient to room near nurses station  Outcome: Progressing  Goal: Maintain or return to baseline ADL function  Description: INTERVENTIONS:  -  Assess patient's ability to carry out ADLs; assess patient's baseline for ADL function and identify physical deficits which impact ability to perform ADLs (bathing, care of mouth/teeth, toileting, grooming, dressing, etc )  - Assess/evaluate cause of self-care deficits   - Assess range of motion  - Assess patient's mobility; develop plan if impaired  - Assess patient's need for assistive devices and provide as appropriate  - Encourage maximum independence but intervene and supervise when necessary  - Involve family in performance of ADLs  - Assess for home care needs following discharge   - Consider OT consult to assist with ADL evaluation and planning for discharge  - Provide patient education as appropriate  Outcome: Progressing  Goal: Maintains/Returns to pre admission functional level  Description: INTERVENTIONS:  - Perform BMAT or MOVE assessment daily    - Set and communicate daily mobility goal to care team and patient/family/caregiver  - Collaborate with rehabilitation services on mobility goals if consulted  - Perform Range of Motion  times a day  - Reposition patient every  hours    - Dangle patient  times a day  - Stand patient  times a day  - Ambulate patient  times a day  - Out of bed to chair  times a day   - Out of bed for meal times a day  - Out of bed for toileting  - Record patient progress and toleration of activity level   Outcome: Progressing     Problem: DISCHARGE PLANNING  Goal: Discharge to home or other facility with appropriate resources  Description: INTERVENTIONS:  - Identify barriers to discharge w/patient and caregiver  - Arrange for needed discharge resources and transportation as appropriate  - Identify discharge learning needs (meds, wound care, etc )  - Arrange for interpretive services to assist at discharge as needed  - Refer to Case Management Department for coordinating discharge planning if the patient needs post-hospital services based on physician/advanced practitioner order or complex needs related to functional status, cognitive ability, or social support system  Outcome: Progressing     Problem: Knowledge Deficit  Goal: Patient/family/caregiver demonstrates understanding of disease process, treatment plan, medications, and discharge instructions  Description: Complete learning assessment and assess knowledge base    Interventions:  - Provide teaching at level of understanding  - Provide teaching via preferred learning methods  Outcome: Progressing

## 2023-06-21 NOTE — UTILIZATION REVIEW
NOTIFICATION OF ADMISSION DISCHARGE   This is a Notification of Discharge from [address] [place] [address]  Please be advised that this patient has been discharge from our facility  Below you will find the admission and discharge date and time including the patient’s disposition  UTILIZATION REVIEW CONTACT:  Alphonso Peterson  Utilization   Network Utilization Review Department  Phone: [phone] x carefully listen to the prompts  All voicemails are confidential   Email: [email or web address]     ADMISSION INFORMATION  PRESENTATION DATE: 6/18/2023  2:16 PM  OBERVATION ADMISSION DATE:   INPATIENT ADMISSION DATE: 6/18/23  4:27 PM   DISCHARGE DATE: 6/20/2023 12:15 PM   DISPOSITION:Home/Self Care    IMPORTANT INFORMATION:  Send all requests for admission clinical reviews, approved or denied determinations and any other requests to dedicated fax number below belonging to the campus where the patient is receiving treatment   List of dedicated fax numbers:  [address] DENIALS (Administrative/Medical Necessity) [phone]   [address] (Maternity/NICU/Pediatrics) [phone]   [facility] [phone]   [facility] 87 [phone]   [address] [phone]   220 [facility] [phone]   [address] [facility] [phone]   [address] [place], [place] [address] [phone]   [facility]  [phone]   [facility] [phone]   412 [facility] 850 [facility] [phone]

## 2023-06-27 ENCOUNTER — TRANSITIONAL CARE MANAGEMENT (OUTPATIENT)
Dept: INTERNAL MEDICINE CLINIC | Facility: CLINIC | Age: 58
End: 2023-06-27

## 2023-06-28 PROBLEM — I25.9 ISCHEMIC HEART DISEASE: Status: ACTIVE | Noted: 2023-06-28

## 2023-06-28 PROBLEM — I25.10 CORONARY ARTERY DISEASE INVOLVING NATIVE CORONARY ARTERY OF NATIVE HEART WITHOUT ANGINA PECTORIS: Status: ACTIVE | Noted: 2023-06-28

## 2023-06-28 NOTE — PROGRESS NOTES
Cardiology Office Follow Up  Inge Alcala  1965  [id number]      ASSESSMENT:  1  Coronary artery disease  a  6/2023 anterior STEMI  b  6/2023 LHC: LM normal, proximal % stenosis, ramus normal, left circumflex normal, RCA normal, PCI/PABLO x1 to proximal LAD  c  6/2023 Echo: LVEF 32%, grade 2 diastolic dysfunction, multiple regional wall motion abnormalities, mild mitral regurgitation, mild tricuspid regurgitation, small pericardial effusion circumferential to the heart with no tamponade  2  Ischemic cardiomyopathy  a  6/2023 Echo: LVEF 14%, grade 2 diastolic dysfunction, multiple regional wall motion abnormalities, mild mitral regurgitation, mild tricuspid regurgitation, small pericardial effusion circumferential to the heart with no tamponade  3  Chronic kidney disease  a  Baseline creatinine looks to be around 1 1-1 4  4  Prediabetes  5  Dyslipidemia  a  6/2023 lipid panel: Cholesterol 151, triglycerides 166, HDL 33, LDL 85  b  6/2023 Lipitor 80 mg daily started  6  Sciatica    PLAN/ DISCUSSION:  • CAD/ recent anterior STEMI --> doing well today having no angina  Continue dual antiplatelet therapy with aspirin and ticagrelor for minimum 1 year  Continue high intensity statin  Recommend cardiac rehab whom he has already reached out to to try and schedule his first appointment  No changes made to medications on this front  We discussed the use of nitroglycerin in an emergency  • Ischemic cardiomyopathy --> he examines euvolemic today not on diuretics  He is on Toprol-XL 12 5 daily and with heart rate in the 50s we will keep him at this dose  His blood pressure is 110/70  We will try to initiate the lowest dose of Entresto possible and we did discuss the signs and symptoms of low blood pressure  If he is experiencing these I have asked him to call the office so we can discuss discontinuing this medication  Cardiac rehab is recommended  We will plan to repeat an echocardiogram in 3 months  "We will also repeat basic metabolic panel in the next 1-2 weeks to ensure stable renal function  • Dyslipidemia --> LDL slightly above goal in the hospital at 85  He has been started on high intensity statin  Plan to follow lipids moving forward likely repeat a lipid panel in 3-4 months  We will arrange follow-up in 3 months to establish with one of our attending cardiologist   In the interim I have asked him to call the office with any questions or concerns  Interval History/ HPI:   This is a 36-year-old gentleman recently hospitalized for chest discomfort  In the emergency department an EKG was performed which showed anterior ST elevations  He was taken to the cardiac catheterization lab and had an intervention as described above  Echo this admission showed EF of 35%  He was noted to have some nonsustained VT as well as idioventricular rhythm in the hospital which was thought to be related to his acute MI and reperfusion  He is here today for posthospital follow-up  Today he comes to the office unaccompanied  He reports feeling well since his discharge  He has been trying to take it easy and not do any strenuous activity or heavy lifting  He is currently working to renovate his home which is an old farm house  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  He has had no recurrent symptoms similar to his angina  His breathing has been stable  He has no palpitations or dizziness  He has been taking all of his medications  He tells me that his wife works for [employer] and is familiar with his prescriptions  Overall he reports feeling well today  We discussed medications moving forward and initiation of cardiac rehab       Vitals:  /70 (BP Location: Left arm, Patient Position: Sitting, Cuff Size: Standard)   Pulse 57   Ht 5' 7\" (1 702 m)   Wt 82 8 kg (182 lb 8 oz)   BMI 28 58 kg/m²      Past Medical History:   Diagnosis Date   • Back ache    • Hemorrhoids  " • Hypertension    • Kidney stone    • Kidney stones      Social History     Socioeconomic History   • Marital status: /Civil Union     Spouse name: Not on file   • Number of children: Not on file   • Years of education: Not on file   • Highest education level: Not on file   Occupational History   • Not on file   Tobacco Use   • Smoking status: Never   • Smokeless tobacco: Never   Vaping Use   • Vaping Use: Never used   Substance and Sexual Activity   • Alcohol use: Not Currently     Alcohol/week: 3 0 standard drinks of alcohol     Types: 3 Cans of beer per week     Comment: 2-3 beers a week   • Drug use: Never   • Sexual activity: Not on file   Other Topics Concern   • Not on file   Social History Narrative   • Not on file     Social Determinants of Health     Financial Resource Strain: Not on file   Food Insecurity: Not on file   Transportation Needs: Not on file   Physical Activity: Not on file   Stress: Not on file   Social Connections: Not on file   Intimate Partner Violence: Not on file   Housing Stability: Not on file      Family History   Problem Relation Age of Onset   • Diabetes Mother    • No Known Problems Father    • Diabetes Sister    • Diabetic kidney disease Sister    • Stroke Sister    • Hyperlipidemia Brother      Past Surgical History:   Procedure Laterality Date   • CARDIAC CATHETERIZATION N/A 6/18/2023    Procedure: Cardiac pci;  Surgeon: Roney Nixon MD;  Location: [facility] CARD[facility];   Service: Cardiology   • NO PAST SURGERIES         Current Outpatient Medications:   •  aspirin (ECOTRIN LOW STRENGTH) 81 mg EC tablet, Take 1 tablet (81 mg total) by mouth daily Do not start before June 21, 2023 , Disp: 30 tablet, Rfl: 0  •  atorvastatin (LIPITOR) 80 mg tablet, Take 1 tablet (80 mg total) by mouth daily at bedtime, Disp: 30 tablet, Rfl: 0  •  metoprolol succinate (TOPROL-XL) 25 mg 24 hr tablet, Take 0 5 tablets (12 5 mg total) by mouth daily Do not start before June 21, 2023 , Disp: 30 tablet, Rfl: 0  •  nitroglycerin (NITROSTAT) 0 4 mg SL tablet, Place 1 tablet (0 4 mg total) under the tongue every 5 (five) minutes as needed for chest pain, Disp: 30 tablet, Rfl: 0  •  ticagrelor (BRILINTA) 90 MG, Take 1 tablet (90 mg total) by mouth every 12 (twelve) hours, Disp: 30 tablet, Rfl: 0      Review of Systems:  Review of Systems   Constitutional: Negative for chills and fever  HENT: Negative for ear pain and sore throat  Eyes: Negative for pain and visual disturbance  Respiratory: Negative for cough and shortness of breath  Cardiovascular: Negative for chest pain and palpitations  Gastrointestinal: Negative for abdominal pain and vomiting  Genitourinary: Negative for dysuria and hematuria  Musculoskeletal: Negative for arthralgias and back pain  Skin: Negative for color change and rash  Neurological: Negative for seizures and syncope  All other systems reviewed and are negative  Physical Exam:  Physical Exam  Constitutional:       Appearance: He is well-developed  HENT:      Head: Normocephalic and atraumatic  Eyes:      Pupils: Pupils are equal, round, and reactive to light  Cardiovascular:      Rate and Rhythm: Normal rate and regular rhythm  Heart sounds: No murmur heard  No friction rub  No gallop  Pulmonary:      Effort: Pulmonary effort is normal  No respiratory distress  Breath sounds: Normal breath sounds  No wheezing or rales  Abdominal:      General: Bowel sounds are normal  There is no distension  Palpations: Abdomen is soft  Tenderness: There is no abdominal tenderness  Musculoskeletal:         General: No deformity  Normal range of motion  Cervical back: Normal range of motion and neck supple  Skin:     General: Skin is warm and dry  Neurological:      Mental Status: He is alert and oriented to person, place, and time     Psychiatric:         Behavior: Behavior normal          This note was completed in part utilizing M-Modal Fluency Direct Software  Grammatical errors, random word insertions, spelling mistakes, and incomplete sentences can be an occasional consequence of this system secondary to software limitations, ambient noise, and hardware issues  If you have any questions or concerns about the content, text, or information contained within the body of this dictation, please contact the provider for clarification

## 2023-06-29 ENCOUNTER — TELEPHONE (OUTPATIENT)
Dept: INTERNAL MEDICINE CLINIC | Facility: CLINIC | Age: 58
End: 2023-06-29

## 2023-06-29 NOTE — TELEPHONE ENCOUNTER
Kary Murillo, [phone]. I just need to follow up with Nanci Vences. I went in for Father's Day and had a heart attack and had a stent put in. I'm meeting with my cardiologist tomorrow morning, [facility] in [place], and then hopefully I'll get cleared away to start some rehab. So I guess they just wanted me to follow up with you for maybe physical or blood work or I don't know, just to keep in touch. As a general, anyway, call me back. Thank you. Bye.    I called and was not able to reach him, asked him to call back and we will make appt.

## 2023-06-30 ENCOUNTER — OFFICE VISIT (OUTPATIENT)
Dept: CARDIOLOGY CLINIC | Facility: CLINIC | Age: 58
End: 2023-06-30
Payer: COMMERCIAL

## 2023-06-30 VITALS
BODY MASS INDEX: 28.64 KG/M2 | SYSTOLIC BLOOD PRESSURE: 110 MMHG | WEIGHT: 182.5 LBS | HEART RATE: 57 BPM | DIASTOLIC BLOOD PRESSURE: 70 MMHG | HEIGHT: 67 IN

## 2023-06-30 DIAGNOSIS — E78.2 MIXED HYPERLIPIDEMIA: ICD-10-CM

## 2023-06-30 DIAGNOSIS — I25.9 ISCHEMIC HEART DISEASE: ICD-10-CM

## 2023-06-30 DIAGNOSIS — I21.3 ST ELEVATION MYOCARDIAL INFARCTION (STEMI), UNSPECIFIED ARTERY (HCC): ICD-10-CM

## 2023-06-30 DIAGNOSIS — I25.10 CORONARY ARTERY DISEASE INVOLVING NATIVE CORONARY ARTERY OF NATIVE HEART WITHOUT ANGINA PECTORIS: Primary | ICD-10-CM

## 2023-06-30 PROCEDURE — 99214 OFFICE O/P EST MOD 30 MIN: CPT | Performed by: PHYSICIAN ASSISTANT

## 2023-06-30 RX ORDER — METOPROLOL SUCCINATE 25 MG/1
12.5 TABLET, EXTENDED RELEASE ORAL DAILY
Qty: 90 TABLET | Refills: 1 | Status: SHIPPED | OUTPATIENT
Start: 2023-06-30

## 2023-06-30 RX ORDER — ATORVASTATIN CALCIUM 80 MG/1
80 TABLET, FILM COATED ORAL
Qty: 90 TABLET | Refills: 3 | Status: SHIPPED | OUTPATIENT
Start: 2023-06-30

## 2023-06-30 NOTE — PATIENT INSTRUCTIONS
Please start taking Entresto twice daily  This is a medications to help get your heart stronger  If you notice that it is making you dizzy and your blood pressure is low please call us and we may need to discontinue it  Please being cardiac rehab  Otherwise, No changes to medications today, please continue everything the same  If you need refills of your cardiac medications prescribed by our office, please call us ahead of time so that they can be filled  We will repeat an echo in about 3 months  We will see you back in the office in 3 months  If you have any questions or concerns in the mean time please do not hesitate to call our office

## 2023-07-13 ENCOUNTER — TREATMENT (OUTPATIENT)
Dept: NON INVASIVE DIAGNOSTICS | Facility: HOSPITAL | Age: 58
End: 2023-07-13

## 2023-07-13 ENCOUNTER — OFFICE VISIT (OUTPATIENT)
Dept: PLASTIC SURGERY | Facility: CLINIC | Age: 58
End: 2023-07-13

## 2023-07-13 ENCOUNTER — CLINICAL SUPPORT (OUTPATIENT)
Dept: CARDIAC REHAB | Facility: HOSPITAL | Age: 58
End: 2023-07-13
Attending: INTERNAL MEDICINE
Payer: COMMERCIAL

## 2023-07-13 VITALS
DIASTOLIC BLOOD PRESSURE: 96 MMHG | HEIGHT: 67 IN | BODY MASS INDEX: 28.56 KG/M2 | HEART RATE: 70 BPM | SYSTOLIC BLOOD PRESSURE: 130 MMHG | TEMPERATURE: 97.1 F | WEIGHT: 182 LBS

## 2023-07-13 DIAGNOSIS — L98.9 CHEST SKIN LESION: Primary | ICD-10-CM

## 2023-07-13 DIAGNOSIS — I25.10 CORONARY ARTERY DISEASE INVOLVING NATIVE CORONARY ARTERY OF NATIVE HEART WITHOUT ANGINA PECTORIS: Primary | ICD-10-CM

## 2023-07-13 DIAGNOSIS — I21.3 ST ELEVATION MYOCARDIAL INFARCTION (STEMI), UNSPECIFIED ARTERY (HCC): Primary | ICD-10-CM

## 2023-07-13 PROCEDURE — 88342 IMHCHEM/IMCYTCHM 1ST ANTB: CPT | Performed by: PATHOLOGY

## 2023-07-13 PROCEDURE — 88341 IMHCHEM/IMCYTCHM EA ADD ANTB: CPT | Performed by: PATHOLOGY

## 2023-07-13 PROCEDURE — 88305 TISSUE EXAM BY PATHOLOGIST: CPT | Performed by: PATHOLOGY

## 2023-07-13 PROCEDURE — 93797 PHYS/QHP OP CAR RHAB WO ECG: CPT

## 2023-07-13 PROCEDURE — 88377 M/PHMTRC ALYS ISHQUANT/SEMIQ: CPT | Performed by: PATHOLOGY

## 2023-07-13 NOTE — PROGRESS NOTES
H&P Exam - Plastic Surgery   Chelsy Roldan 63 y.o. male MRN: [id number]  Unit/Bed#:  Encounter: [id number]    Assessment/Plan     Assessment:  Pt presents with a subcutaneous tissue mass, of about 10 year duration. This is now inhibiting his ability to reach and throw with his left arm. During the exam a pigmented lesion was noticed on his right upper chest.  This was biopsied. Biopsy    Date/Time: 7/13/2023 9:00 AM    Performed by: Abdias Rodarte PA-C  Authorized by: Abdias Rodarte PA-C  Universal Protocol:  Consent: Verbal consent obtained. Written consent not obtained. Risks and benefits: risks, benefits and alternatives were discussed  Consent given by: patient  Time out: Immediately prior to procedure a "time out" was called to verify the correct patient, procedure, equipment, support staff and site/side marked as required. Patient understanding: patient states understanding of the procedure being performed  Patient consent: the patient's understanding of the procedure matches consent given  Procedure consent: procedure consent matches procedure scheduled  Relevant documents: relevant documents present and verified  Test results: test results available and properly labeled  Site marked: the operative site was not marked  Radiology Images displayed and confirmed. If images not available, report reviewed: imaging studies not available  Required items: required blood products, implants, devices, and special equipment available  Patient identity confirmed: verbally with patient      Procedure Details - Lesion Biopsy: Body area:  Trunk    Trunk location:  Chest    Biopsy method: punch biopsy      Biopsy tissue type: skin and subcutaneous    Initial size (mm):  6    Final defect size (mm):  2    Malignancy: malignancy unknown          Plan:  Biopsy of right upper chest macule. Ultrasound left back subcutaneous mass. History of Present Illness       HPI:  Chelsy Roldan is a 63 y.o. male who presents with a subcutaneous mass of his left back, just posterior to his axilla. He reports having this for about 10 years. He feels it is enlarging, and has noticed that it has begun impinging on his ability to reach and throw with his left arm. He has a history of recent MI and stent placement, 6/18/23. He is a nonsmoker. He now takes Brilinta since the PCI. During the visit, a suspicious  pigmented lesion was noted on his right upper chest. This was biopsied today. His brother has a history of skin cancer, unknown type. Review of Systems   Constitutional: Negative. HENT: Negative. Eyes: Negative. Respiratory: Negative. Cardiovascular: Negative. Gastrointestinal: Negative. Endocrine: Negative. Genitourinary: Negative. Musculoskeletal: Positive for back pain. Skin: Negative. Allergic/Immunologic: Negative. Neurological: Negative. Hematological: Negative. Psychiatric/Behavioral: Negative. Historical Information   Past Medical History:   Diagnosis Date   • Back ache    • Hemorrhoids    • Hypertension    • Kidney stone    • Kidney stones      Past Surgical History:   Procedure Laterality Date   • CARDIAC CATHETERIZATION N/A 6/18/2023    Procedure: Cardiac pci;  Surgeon: Jyoti Nieves MD;  Location[facility];   Service: Cardiology   • NO PAST SURGERIES       Social History   Social History     Substance and Sexual Activity   Alcohol Use Not Currently   • Alcohol/week: 3.0 standard drinks of alcohol   • Types: 3 Cans of beer per week    Comment: 2-3 beers a week     Social History     Substance and Sexual Activity   Drug Use Never     Social History     Tobacco Use   Smoking Status Never   Smokeless Tobacco Never     E-Cigarette/Vaping   • E-Cigarette Use Never User      E-Cigarette/Vaping Substances   • Nicotine No    • THC No    • CBD No    • Flavoring No    • Other No    • Unknown No      Family History:   Family History   Problem Relation Age of Onset   • Diabetes Mother    • No Known Problems Father    • Diabetes Sister    • Diabetic kidney disease Sister    • Stroke Sister    • Hyperlipidemia Brother        Meds/Allergies     Current Outpatient Medications:   •  aspirin (ECOTRIN LOW STRENGTH) 81 mg EC tablet, Take 1 tablet (81 mg total) by mouth daily, Disp: 90 tablet, Rfl: 3  •  atorvastatin (LIPITOR) 80 mg tablet, Take 1 tablet (80 mg total) by mouth daily at bedtime, Disp: 90 tablet, Rfl: 3  •  metoprolol succinate (TOPROL-XL) 25 mg 24 hr tablet, Take 0.5 tablets (12.5 mg total) by mouth daily, Disp: 90 tablet, Rfl: 1  •  nitroglycerin (NITROSTAT) 0.4 mg SL tablet, Place 1 tablet (0.4 mg total) under the tongue every 5 (five) minutes as needed for chest pain, Disp: 30 tablet, Rfl: 0  •  ticagrelor (BRILINTA) 90 MG, Take 1 tablet (90 mg total) by mouth every 12 (twelve) hours, Disp: 180 tablet, Rfl: 3  •  sacubitril-valsartan (ENTRESTO) 24-26 MG TABS, Take 1 tablet by mouth 2 (two) times a day (Patient not taking: Reported on 7/13/2023), Disp: 30 tablet, Rfl: 3  Allergies   Allergen Reactions   • Turmeric - Food Allergy Anaphylaxis   • Niacin Other (See Comments)     Other reaction(s): NIACIN (NIACIN) (facial flushing)   • Fenofibrate Dizziness     Other reaction(s): dizziness       Objective   First Vitals:   @VSFIRST2(5,8,6,7,9,11,14,10:FIRST)@    Current Vitals:        [unfilled]    Invasive Devices     None                 Physical Exam  Vitals and nursing note reviewed. Constitutional:       General: He is not in acute distress. Appearance: He is well-developed. HENT:      Head: Normocephalic and atraumatic. Eyes:      Conjunctiva/sclera: Conjunctivae normal.   Cardiovascular:      Rate and Rhythm: Normal rate and regular rhythm. Heart sounds: No murmur heard. Pulmonary:      Effort: Pulmonary effort is normal. No respiratory distress. Breath sounds: Normal breath sounds. Abdominal:      Palpations: Abdomen is soft. Tenderness: There is no abdominal tenderness. Musculoskeletal:         General: No swelling. Cervical back: Neck supple. Skin:     General: Skin is warm and dry. Capillary Refill: Capillary refill takes less than 2 seconds. Neurological:      Mental Status: He is alert. Psychiatric:         Mood and Affect: Mood normal.         Lab Results: I have personally reviewed pertinent lab results. Imaging: I have personally reviewed pertinent reports. EKG, Pathology, and Other Studies: I have personally reviewed pertinent reports.

## 2023-07-13 NOTE — PROGRESS NOTES
Alex Manley reported to me today during his cardiac rehab evaluation that he started taking Entresto. He repots he has been feeling "foggy", fatigued, and some pressure on his lungs. Alex Manley reported that he did not take it last night and is feeling much better today. I Tiger Texted [facility] who the patient recently saw for a hospital follow up on 6/30 and reported this to him. He is to stop taking the [facility] and monitor symptoms and blood pressure closely. He is also to schedule follow-up with Dr. Montana Lombardo. He will start rehab next week pending his insurance.

## 2023-07-13 NOTE — PROGRESS NOTES
Cardiac Rehabilitation Plan of Care   Initial Care Plan          Today's date: 2023   # of Exercise Sessions Completed: 1 - Evaluation   Patient name: Salty Rebollar      : 1965  Age: 63 y.o. MRN: [id number]  Referring Physician: Toña Nguyen MD  Cardiologist: Dr. Leonarda Mcfarlane (waiting to pick a provider)   Provider: [place]  Clinician: John Lr MS, CEP     Dx:   Encounter Diagnosis   Name Primary? • ST elevation myocardial infarction (STEMI), unspecified artery ([address]) Yes     Date of onset: 2023      SUMMARY OF PROGRESS:  Today is Calos Melendez's initial evaluation to begin Cardiac Rehab post STEMI and PCIx1 to proximal LAD. He was having radiating upper back pain and vomiting that led him into the hospital. He was also found to have ischemic cardiomyopathy with EF: 35%. He reports he has been doing well since heart event but was recently started on Entresto which he was feeling symptomatic with. Staff spoke with Abida Devries PA-C who prescribed it and he recommended the patient D/C Entresto at this time. The patient does not currently follow a formal exercise program at home. He has resumed light to moderate ADLs without limitations. Depression screening using the PHQ-9 interprets the patient's score of  3  as  1-4 = Minimal Depression. GREG-7 screening tool for anxiety suggests 0  0-4  = Not anxious. When addressed, the patient denies having depression/anxiety. Patient reports excellent social/emotional support from family. Information to utilize [employer] was provided as well as contact information for counseling through [employer]. PHQ-9 score will be reassessed in 30 days due to an initial score revealing concern for depression. They rate stress 2/10 with the following stressors: manageable daily stress. Stress management will be reviewed. The patient is a non-smoker. Patient admits to 100% medication compliance.  They report the following physical limitations: sciatica. The patient completed an initial submaximal TM ETT. The patient completed 8:38 minutes of stage 3 (4.3 METs) with test termination of RHR +30 and ECG. At the last stage, patient went into either a run of sinus tach or SVT. Vital signs were normal but patient did feel some lightheadedness which subsided with rest and hydration. Will continue to monitor closely. Resting  /62 with Normal response to exercise reaching 116//84. Blood Pressure will be monitored throughout the program and cardiologist will be notified of elevated trends. Patient reported symptoms with exercise including post lightheadedness. . Telemetry revealed Sinus w/ occ PVCs, run of sinus tach/SVT, and bigeminy PACs in recovery. Carmen Mg was counseled on exercise guidelines to achieve a minimum of 150 mins/wk of moderate intensity (RPE 4-6) exercise and encouraged to add 1-2 days of exercise on opposite days of cardiac rehab as tolerated. We discussed current dietary habits and goals of heart healthy eating for lipid management, diabetes management and weight loss. The patient has Pre-diabetes. Patient's personal goals include: to increase EF. The patient's CAD risk factors include:  inactivity, obesity/overweight, hypertension, hyperlipidemia and diabetes. His education will focus on lifestyle modification/education specific to His needs. Patient will attend group education classes on heart healthy eating, reading food labels, stress management, risk factor reduction, understanding heart disease and common heart medications. Patient will attend 35 monitored exercise sessions, 3x/wk for 12-18 weeks beginning 07/18/2023 tentatively (waiting on insurance pre-determination) .        Medication compliance: Yes   Comments: Pt reports to be compliant with medications               7/13/23: to stop taking Entresto per Ruperto Bell PA-C  Fall Risk: Low   Comments: Ambulates with a steady gait with no assist device    EKG Interpretation: NSR w/ occ PVCs, run of sinus tach/SVT, bigeminy PACs      EXERCISE ASSESSMENT and PLAN    Exercise Prescription:      Frequency: 3 days/week   Supplement with home exercise 2+ days/wk as tolerated       Minutes: 30-40         METS: 2.0-2.8            HR: RHR+ 30 beats above    RPE: 4-6         Modalities: Treadmill, UBE, Lifecycle, NuStep and Recumbent bike      30 Day Goals for Exercise Progression:    Frequency: 3 days/week of cardiac rehab       Supplement with home exercise 2+ days/wk as tolerated    Minutes: 40-45                              >150 mins/wk of moderate intensity exercise   METS: 2.9-3.5   HR: RHR+ 30 beats above     RPE: 3-4   Modalities: Treadmill, UBE, Lifecycle, Elliptical, Rower, NuStep and Recumbent bike    Strength trainin-3 days / week  12-15 repetitions  1-2 sets per modality   Will be added following 2-3 weeks of monitored exercise sessions   Modalities: Pull Downs, Lateral Raise, Arm Extension, Arm Curl, Upright Rows, Front Raises, Shoulder Shrugs and leg extension     Home Exercise: none    Goals: 10% improvement in functional capacity - based on max METs achieved in fitness assessment, Reduced dyspnea with physical activity  0-1/10, improved DASI score by 10%, Increase in exercise capacity by 40% - based on peak METs tolerated in cardiac rehab exercise session, Exercise 5 days/wk, >150mins/wk of moderate intensity exercise, Resume ADLs with increased strength, Return to work unrestricted, Attend Rehab regularly, Start a walking program and start a home exercise program    Progression Toward Goals:  Reviewed Pt goals and determined plan of care, Patient will start light home walking  in the next 30 days, Will continue to educate and progress as tolerated. , Closely monitor EKG for any abnormal rhythms    Education: benefit of exercise for CAD risk factors, home exercise guidelines, AHA guidelines to achieve >150 mins/wk of moderate exercise, RPE scale, class: Risk Factors for Heart Disease, exercise instructions/guidelines for discharge  and physical activity/exercise in extreme weather conditions   Plan:education on home exercise guidelines, home exercise 30+ mins 2 days opposite CR and Education class: Risk Factors for Heart Disease  Readiness to change: Preparation:  (Getting ready to change)       NUTRITION ASSESSMENT AND PLAN    Weight control:    Starting weight: 187 lbs    Current weight:       Diabetes: Pre-diabetes  A1c: 5.6     last measured: 06/19/2023    Lipid management: Discussed diet and lipid management and Last lipid profile 06/19/2023  Chol 151    HDL 33  LDL 85    Goals:reduced BMI to < 25, LDL <100, HDL >40, TRG <150, CHOL <200, decreased body fat% <25%   (M), reduced waist circumference <40 inches (M), fasting BG , Improved Rate Your Plate score  >05, 4.3-0%  wt loss, choose lean meat (93-95%), reduce portion sizes of meat to 3oz or less, increase intake of fish, shellfish, increase intake of meatless meals, use low fat dairy, reduce cheese intake or use reduced-fat, eat 3 or more servings of whole grains a day, Eat 4-5 cups of fruits and vegetables daily, use olive or canola oil in baking, eliminate butter, use fat-free dressings/monsivais or seldom use, choose healthy snacks: light popcorn, plain pretzels, seldom eat or choose low fat ice-cream, fruit juice bars or frozen yogurt , eliminate or choose low-fat sweets and daily saturated fat intake <7%/13g    Measurable goals were based Rate Your Plate Dietary Self-Assessment. These are the areas in which the patient could score higher on the assessment. Goals include recommendations for a heart healthy diet based on American Heart Association. Progression Toward Goals: Reviewed Pt goals and determined plan of care, Patient will continue to make healthy choices and stay hydrated in the next 30 days, Will continue to educate and progress as tolerated.     Education: heart healthy eating  low sodium diet  hydration  nutrition for  lipid management  nutrition for Improved BG control  target goal for A1c <7.0  exercise and diabetes management   wt. loss   portion control  education class: Heart Healthy Eating  education class:  Label Reading  Plan: Education class: Reading Food Labels, Education Class: Heart Healthy Eating, switch to low fat cheeses, replace butter with soft spreads made with olive oil, canola or yogurt, replace refined grain bread with whole grain bread, replace unhealthy snacks with fruits & vegs, reduce portion sizes, reduce red meat 1x/wk, switch to skim or 1% milk, eat fewer desserts and sweets, avoid processed foods, remove salt shaker from table, use salt substitute like Mrs. Dye, increase utilization of fresh or dried herbs, will try new grains like brown rice, quinoa, farro, drink more water, learn how to read food labels and keep added daily sugar <25g/day  Readiness to change: Action:  (Changing behavior)      PSYCHOSOCIAL ASSESSMENT AND PLAN    Emotional:  Depression assessment:  PHQ-9 = 3  1-4 = Minimal Depression            Anxiety measure:  GREG-7 = 0  0-4  = Not anxious  Self-reported stress level:  2  Social support: Excellent    Goals:  Overall Health in [facility] Score < 3, improved sleep, Improved appetite and increased energy    Progression Toward Goals: Reviewed Pt goals and determined plan of care, Will continue to educate and progress as tolerated.     Education: signs/sxs of depression and benefits of a positive support system  Plan: Class: Stress and Your Health, Exercise, Spend time outdoors, Enjoy a hobby, Keep a positive mindset and Enjoy family  Readiness to change: Action:  (Changing behavior)      OTHER CORE COMPONENTS     Tobacco:   Social History     Tobacco Use   Smoking Status Never   Smokeless Tobacco Never       Tobacco Use Intervention:   N/A:  Patient is a non-smoker     Anginal Symptoms:  nausea/vomiting and upper back  pain   NTG use: No prescription    Blood pressure:    Restin/62   Exercise: 116//84    Goals: consistent BP < 130/80, reduced dietary sodium <2300mg, moderate intensity exercise >150 mins/wk, medication compliance and reduce number of medications  needed for BP control    Progression Toward Goals: Reviewed Pt goals and determined plan of care, Patient will get home blood pressure machine  in the next 30 days, Will continue to educate and progress as tolerated.     Education:  understanding high blood pressure and it's relationship to CAD, low sodium diet and HTN, Education class:  Common Heart Medications and Education class: Understanding Heart Disease  Plan: Class: Understanding Heart Disease, Class: Common Heart Medications, avoid places with second hand smoke, Avoid Processed foods, engage in regular exercise, eliminate salt shaker at the table, use salt substitutes, check labels for sodium content and monitor home BP  Readiness to change: Preparation:  (Getting ready to change)

## 2023-07-13 NOTE — PROGRESS NOTES
Discussed with cardiac rehab patient has arrived today for initial evaluation feeling tired and groggy. I did try introducing Entresto on his last visit and reportedly since starting this he has had the symptoms. He did not take his dose last night and feels better. His initial systolic blood pressure resting at rehab was 106. At this point I recommended holding Entresto because I do not think his blood pressure is able to tolerate it. Also reviewed some telemetry strips from his evaluation. He had what appears to be either very brief (2 minutes) SVT with heart rate 130-150 or more likely sinus tachycardia when on the treadmill. R-R intervals are irregular supporting sinus tachycardia. There is a fair amount of baseline artifact but there does appear to be occasional P-QRS complexes. Final resting strips are sinus with occasional PVC's. No other changes were made to his medications today. I did request that he call the cardiology office for his follow-up appointment. He is going to begin exercise at a lesser gradient than his evaluation today.

## 2023-07-13 NOTE — PROGRESS NOTES
CARDIAC REHAB ASSESSMENT    Today's date: 2023  Patient name: Kellie Roman     : 1965       MRN: [id number]  PCP: Frank Perez MD  Referring Physician: Ibis Thomason MD  Cardiologist: Dr. Dany Goldmann (still needs to choose a provider)   Surgeon: N/A  Dx:   Encounter Diagnosis   Name Primary?    • ST elevation myocardial infarction (STEMI), unspecified artery ([address]) Yes       Date of onset: 2023  Cultural needs: None     Weight    Wt Readings from Last 1 Encounters:   23 82.6 kg (182 lb)      Height:   Ht Readings from Last 1 Encounters:   23 5' 7" (1.702 m)     Medical History:   Past Medical History:   Diagnosis Date   • Back ache    • Hemorrhoids    • Hypertension    • Kidney stone    • Kidney stones          Physical Limitations: None     Fall Risk: Low   Comments: Ambulates with a steady gait with no assist device    Anginal Equivalent: Vomiting, Lightheadedness and Upper back pain and one incident prior to event of left arm numbness at night    NTG use: No prescription    Risk Factors   Cholesterol: Yes  Smoking: Never used  HTN: Yes  DM: Pre-diabetes   Obesity: Yes   Inactivity: No  Stress:  perceived  stress: 2/10   Stressors: manageable stress, more physical stress with job    Goals for Stress Management:Exercise, Keep a positive mindset, Spend time outside, Enjoy a hobby and Spend time with family    Family History:  Family History   Problem Relation Age of Onset   • Diabetes Mother    • No Known Problems Father    • Diabetes Sister    • Diabetic kidney disease Sister    • Stroke Sister    • Hyperlipidemia Brother        Allergies: Turmeric - food allergy, Niacin, and Fenofibrate  ETOH:   Social History     Substance and Sexual Activity   Alcohol Use Not Currently   • Alcohol/week: 3.0 standard drinks of alcohol   • Types: 3 Cans of beer per week    Comment: 2-3 beers a week         Current Medications:   Current Outpatient Medications   Medication Sig Dispense Refill   • aspirin (ECOTRIN LOW STRENGTH) 81 mg EC tablet Take 1 tablet (81 mg total) by mouth daily 90 tablet 3   • atorvastatin (LIPITOR) 80 mg tablet Take 1 tablet (80 mg total) by mouth daily at bedtime 90 tablet 3   • metoprolol succinate (TOPROL-XL) 25 mg 24 hr tablet Take 0.5 tablets (12.5 mg total) by mouth daily 90 tablet 1   • nitroglycerin (NITROSTAT) 0.4 mg SL tablet Place 1 tablet (0.4 mg total) under the tongue every 5 (five) minutes as needed for chest pain 30 tablet 0   • sacubitril-valsartan (ENTRESTO) 24-26 MG TABS Take 1 tablet by mouth 2 (two) times a day (Patient not taking: Reported on 7/13/2023) 30 tablet 3   • ticagrelor (BRILINTA) 90 MG Take 1 tablet (90 mg total) by mouth every 12 (twelve) hours 180 tablet 3     No current facility-administered medications for this visit. 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   Recreation: N/A  ADL’s:Capable of performing light to moderate ADLs  Lewis: No limitations  Exercise: None, encouraged light walking   Other: None     Patient Specific Goals:   To increase EF     Short Term Program Goals: dietary modifications increased strength improved energy/stamina with ADLs exercise 120-150 mins/wk improved BG control wt loss 1-2 ppw continue working     Long Term Goals: increased maximal walking duration  increased intial training workload  Improved [facility] Activity Status score  Improved functional capacity  Improved Quality of Life - [facility] score reduced  Improved lipid profile  Reduced body fat%  Improved fasting glucose  improved Rate Your Plate Score    Ability to reach goals/rehabilitation potential:  Very Good     Projected return to function: 8-12 weeks  Objective tests: sub-max TM ETT      Nutritional   Reviewed details of Rate your Plate. Discussed key elements of heart healthy eating. Reviewed patient goals for dietary modifications and their clinical implications. Reviewed most recent lipid profile. Goals for dietary modification based on Rate Your Plate Dietary Assessment:  low fat ground meat and poultry  reduce portions of meat to 3 oz  increase fish intake  more meatless meals  low fat dairy   reduced fat cheese  increase whole grains  increase fruits and vegetables  eliminate butter  low sodium  improved snack choices  reduce sweets/frozen desserts      Emotional/Social  Patient reports he/she is coping well with good social support and denies depression or anxiety    Marital status:     Domestic Violence Screening: No    Comments: 6/18/2023: Pt went to ED for radiating upper back pain and vomiting/nausea, STEMI alert, LAD lesion S/P PTCA and stenting, EF: 35%. Prediabetes   Pt reported today that he recently started Entresto and was feeling "foggy" and pressure on his lungs.  Tiger Texted [employer] who is the one he recommended started and he is to stop taking the [facility] which was relayed to the patient   Repeat echo to be scheduled in 3 months

## 2023-07-18 ENCOUNTER — CLINICAL SUPPORT (OUTPATIENT)
Dept: CARDIAC REHAB | Facility: HOSPITAL | Age: 58
End: 2023-07-18
Attending: INTERNAL MEDICINE
Payer: COMMERCIAL

## 2023-07-18 DIAGNOSIS — I21.3 ST ELEVATION MYOCARDIAL INFARCTION (STEMI), UNSPECIFIED ARTERY (HCC): Primary | ICD-10-CM

## 2023-07-18 PROCEDURE — 93798 PHYS/QHP OP CAR RHAB W/ECG: CPT

## 2023-07-20 ENCOUNTER — CLINICAL SUPPORT (OUTPATIENT)
Dept: CARDIAC REHAB | Facility: HOSPITAL | Age: 58
End: 2023-07-20
Attending: INTERNAL MEDICINE
Payer: COMMERCIAL

## 2023-07-20 DIAGNOSIS — I21.3 ST ELEVATION MYOCARDIAL INFARCTION (STEMI), UNSPECIFIED ARTERY (HCC): Primary | ICD-10-CM

## 2023-07-20 PROCEDURE — 93798 PHYS/QHP OP CAR RHAB W/ECG: CPT

## 2023-07-21 ENCOUNTER — CLINICAL SUPPORT (OUTPATIENT)
Dept: CARDIAC REHAB | Facility: HOSPITAL | Age: 58
End: 2023-07-21
Attending: INTERNAL MEDICINE
Payer: COMMERCIAL

## 2023-07-21 DIAGNOSIS — I21.3 ST ELEVATION MYOCARDIAL INFARCTION (STEMI), UNSPECIFIED ARTERY (HCC): Primary | ICD-10-CM

## 2023-07-21 PROCEDURE — 93798 PHYS/QHP OP CAR RHAB W/ECG: CPT

## 2023-07-24 ENCOUNTER — OFFICE VISIT (OUTPATIENT)
Dept: PLASTIC SURGERY | Facility: CLINIC | Age: 58
End: 2023-07-24

## 2023-07-24 DIAGNOSIS — L98.9 SKIN LESION OF CHEST WALL: Primary | ICD-10-CM

## 2023-07-24 NOTE — PROGRESS NOTES
Assessment/Plan:     Patient is a 16-year-old male who is status post biopsy of the right chest by Orlando Fong PA-C on 7/13/23. Patient also inquired about potential removal of the left back mass during this visit and was sent for ultrasound. Please see HPI. Patient presents to the office today for suture removal.  Tissue biopsy has not yet been resulted. Patient has not yet obtained ultrasound of left back mass. We will call the patient when these have been resulted for potential surgical intervention as appropriate. There are no diagnoses linked to this encounter. Subjective:     Patient ID: Poncho Villalba is a 63 y.o. male. HPI     Patient reports no issues or concerns. Review of Systems    See HPI    Objective:     Physical Exam      Incision and sutures are clean, dry and intact.

## 2023-07-25 ENCOUNTER — CLINICAL SUPPORT (OUTPATIENT)
Dept: CARDIAC REHAB | Facility: HOSPITAL | Age: 58
End: 2023-07-25
Attending: INTERNAL MEDICINE
Payer: COMMERCIAL

## 2023-07-25 ENCOUNTER — TELEPHONE (OUTPATIENT)
Dept: PLASTIC SURGERY | Facility: CLINIC | Age: 58
End: 2023-07-25

## 2023-07-25 DIAGNOSIS — I21.3 ST ELEVATION MYOCARDIAL INFARCTION (STEMI), UNSPECIFIED ARTERY (HCC): Primary | ICD-10-CM

## 2023-07-25 DIAGNOSIS — L98.9 SKIN LESION OF CHEST WALL: Primary | ICD-10-CM

## 2023-07-25 PROCEDURE — 93798 PHYS/QHP OP CAR RHAB W/ECG: CPT

## 2023-07-27 ENCOUNTER — CLINICAL SUPPORT (OUTPATIENT)
Dept: CARDIAC REHAB | Facility: HOSPITAL | Age: 58
End: 2023-07-27
Attending: INTERNAL MEDICINE
Payer: COMMERCIAL

## 2023-07-27 DIAGNOSIS — I21.3 ST ELEVATION MYOCARDIAL INFARCTION (STEMI), UNSPECIFIED ARTERY (HCC): Primary | ICD-10-CM

## 2023-07-27 PROCEDURE — 93798 PHYS/QHP OP CAR RHAB W/ECG: CPT

## 2023-07-28 ENCOUNTER — CLINICAL SUPPORT (OUTPATIENT)
Dept: CARDIAC REHAB | Facility: HOSPITAL | Age: 58
End: 2023-07-28
Attending: INTERNAL MEDICINE
Payer: COMMERCIAL

## 2023-07-28 ENCOUNTER — HOSPITAL ENCOUNTER (OUTPATIENT)
Dept: ULTRASOUND IMAGING | Facility: HOSPITAL | Age: 58
Discharge: HOME | End: 2023-07-28
Payer: COMMERCIAL

## 2023-07-28 DIAGNOSIS — I21.3 ST ELEVATION MYOCARDIAL INFARCTION (STEMI), UNSPECIFIED ARTERY (HCC): Primary | ICD-10-CM

## 2023-07-28 DIAGNOSIS — L98.9 SKIN LESION OF CHEST WALL: ICD-10-CM

## 2023-07-28 PROCEDURE — 76705 ECHO EXAM OF ABDOMEN: CPT

## 2023-07-28 PROCEDURE — 93798 PHYS/QHP OP CAR RHAB W/ECG: CPT

## 2023-08-01 ENCOUNTER — CLINICAL SUPPORT (OUTPATIENT)
Dept: CARDIAC REHAB | Facility: HOSPITAL | Age: 58
End: 2023-08-01
Attending: INTERNAL MEDICINE
Payer: COMMERCIAL

## 2023-08-01 DIAGNOSIS — I21.3 ST ELEVATION MYOCARDIAL INFARCTION (STEMI), UNSPECIFIED ARTERY (HCC): Primary | ICD-10-CM

## 2023-08-01 PROCEDURE — 93798 PHYS/QHP OP CAR RHAB W/ECG: CPT

## 2023-08-03 ENCOUNTER — CLINICAL SUPPORT (OUTPATIENT)
Dept: CARDIAC REHAB | Facility: HOSPITAL | Age: 58
End: 2023-08-03
Attending: INTERNAL MEDICINE
Payer: COMMERCIAL

## 2023-08-03 DIAGNOSIS — I21.3 ST ELEVATION MYOCARDIAL INFARCTION (STEMI), UNSPECIFIED ARTERY (HCC): Primary | ICD-10-CM

## 2023-08-03 PROCEDURE — 93798 PHYS/QHP OP CAR RHAB W/ECG: CPT

## 2023-08-04 ENCOUNTER — CLINICAL SUPPORT (OUTPATIENT)
Dept: CARDIAC REHAB | Facility: HOSPITAL | Age: 58
End: 2023-08-04
Attending: INTERNAL MEDICINE
Payer: COMMERCIAL

## 2023-08-04 DIAGNOSIS — I21.3 ST ELEVATION MYOCARDIAL INFARCTION (STEMI), UNSPECIFIED ARTERY (HCC): Primary | ICD-10-CM

## 2023-08-04 PROCEDURE — 93798 PHYS/QHP OP CAR RHAB W/ECG: CPT

## 2023-08-08 ENCOUNTER — CLINICAL SUPPORT (OUTPATIENT)
Dept: CARDIAC REHAB | Facility: HOSPITAL | Age: 58
End: 2023-08-08
Attending: INTERNAL MEDICINE
Payer: COMMERCIAL

## 2023-08-08 DIAGNOSIS — I21.3 ST ELEVATION MYOCARDIAL INFARCTION (STEMI), UNSPECIFIED ARTERY (HCC): Primary | ICD-10-CM

## 2023-08-08 PROCEDURE — 93798 PHYS/QHP OP CAR RHAB W/ECG: CPT

## 2023-08-10 ENCOUNTER — CLINICAL SUPPORT (OUTPATIENT)
Dept: CARDIAC REHAB | Facility: HOSPITAL | Age: 58
End: 2023-08-10
Attending: INTERNAL MEDICINE
Payer: COMMERCIAL

## 2023-08-10 DIAGNOSIS — I21.3 ST ELEVATION MYOCARDIAL INFARCTION (STEMI), UNSPECIFIED ARTERY (HCC): Primary | ICD-10-CM

## 2023-08-10 PROCEDURE — 93798 PHYS/QHP OP CAR RHAB W/ECG: CPT

## 2023-08-10 NOTE — PROGRESS NOTES
Cardiac Rehabilitation Plan of Care   30 Day Reassessment          Today's date: 8/10/2023   # of Exercise Sessions Completed: 12  Patient name: Chelsy Roldan      : 1965  Age: 63 y.o. MRN: [id number]  Referring Physician: Magnolia Vu MD  Cardiologist: Dr. Brittny Mazariegos (waiting to pick a provider)   Provider: Darin Whitney  Clinician: Sue Medrano MS, CEP      Dx:   Encounter Diagnosis   Name Primary? • ST elevation myocardial infarction (STEMI), unspecified artery ([address]) Yes     Date of onset: 2023      SUMMARY OF PROGRESS:  Mr. Loraine Solo has started his cardiac rehabilitation program post STEMI and PCIx1 to proximal LAD. He was having radiating upper back pain and vomiting that led him into the hospital. He was also found to have ischemic cardiomyopathy with EF: 35%. He has completed 12 sessions over the past 30 days. He is doing well overall and is tolerating exercise sessions well. He is completing 45-50 min of aerobic exercise and is increasing his/her exercise intensities and times gradually as he tolerates. He is reaching 4.7 METs and shows normal hemodynamic response to exercise. BP is 110//80 at rest. Telemetry shows NSR with rare PVCs. He has not started home exercise program, but he has been encouraged to start walking at home 20-30 min with goal of reaching 150-200 min/week of aerobic exercise. His goal for his rehab program is to improve his EF. He reports he is on target with personal goals at 30 days. He reports feeling very good with the exercise and can feel it is helping him. He does not have a repeat echo scheduled at this time. Initial depression screening using the PHQ-9 interprets the patient's score of  3. GREG-7 screening tool for anxiety suggests 0. PHQ-9 will not need to be reassessed every 30 days due to no concern with depression or anxiety. He reports excellent social/emotional support from family and friends.   Information to utilize [employer] was provided as well as contact information for counseling through [employer]. His CAD risk factors include: inactivity, obesity/overweight, hypertension, hyperlipidemia and diabetes. He admits to 100% medication compliance. He is attending weekly group education classes on heart healthy eating, reading food labels, stress management, risk factor reduction, understanding heart disease and common heart medications. He is in agreement to continue cardiac rehab sessions attending 3x/week at this time with goal of reaching 36 sessions. Will continue to monitor and progress exercise over next 30 days of rehab.       Medication compliance: Yes   Comments: Pt reports to be compliant with medications               23: to stop taking Entresto per Nanci Aviles PA-C  Fall Risk: Low   Comments: Ambulates with a steady gait with no assist device    EKG Interpretation: NSR w/ occ PVCs, run of sinus tach/SVT, bigeminy PACs      EXERCISE ASSESSMENT and PLAN    Exercise Prescription:      Frequency: 3 days/week   Supplement with home exercise 2+ days/wk as tolerated       Minutes: 30-60         METS: 2.0-6.0            HR: RHR+ 30 beats above    RPE: 4-6         Modalities: Treadmill, UBE, Lifecycle, NuStep and Recumbent bike      30 Day Goals for Exercise Progression:    Frequency: 3 days/week of cardiac rehab       Supplement with home exercise 2+ days/wk as tolerated    Minutes: 40-50                             >150 mins/wk of moderate intensity exercise   METS: 4.0-5.0   HR: RHR+ 30 beats above     RPE: 3-4   Modalities: Treadmill, UBE, Lifecycle, Elliptical, Rower, NuStep and Recumbent bike    Strength trainin-3 days / week  12-15 repetitions  1-2 sets per modality   Will be added following 2-3 weeks of monitored exercise sessions   Modalities: Pull Downs, Lateral Raise, Arm Extension, Arm Curl, Upright Rows, Front Raises, Shoulder Shrugs and leg extension     Home Exercise: none-have encouraged walking 20-30 min on non-rehab days with goal of reaching 150-200 min/week of aerobic exercise    Goals: 10% improvement in functional capacity - based on max METs achieved in fitness assessment, Reduced dyspnea with physical activity  0-1/10, improved DASI score by 10%, Increase in exercise capacity by 40% - based on peak METs tolerated in cardiac rehab exercise session, Exercise 5 days/wk, >150mins/wk of moderate intensity exercise, Resume ADLs with increased strength, Return to work unrestricted, Attend Rehab regularly, Start a walking program and start a home exercise program    Progression Toward Goals:  Pt is progressing and showing improvement  toward the following goals:  increasing overall strength and endurance with regular exercise, improving functional capacity shown by increasing MET levels, attending CR sessions 3x/week regularly, no concerns at this time.         Education: benefit of exercise for CAD risk factors, home exercise guidelines, AHA guidelines to achieve >150 mins/wk of moderate exercise, RPE scale, class: Risk Factors for Heart Disease, exercise instructions/guidelines for discharge  and physical activity/exercise in extreme weather conditions   Plan:education on home exercise guidelines, home exercise 30+ mins 2 days opposite CR and Education class: Risk Factors for Heart Disease  Readiness to change: Preparation:  (Getting ready to change)       NUTRITION ASSESSMENT AND PLAN    Weight control:    Starting weight: 187 lbs    Current weight: 185 lbs    Diabetes: Pre-diabetes  A1c: 5.6     last measured: 06/19/2023    Lipid management: Discussed diet and lipid management and Last lipid profile 06/19/2023  Chol 151    HDL 33  LDL 85    Goals:reduced BMI to < 25, LDL <100, HDL >40, TRG <150, CHOL <200, decreased body fat% <25%   (M), reduced waist circumference <40 inches (M), fasting BG , Improved Rate Your Plate score  >97, 1.6-3%  wt loss, choose lean meat (93-95%), reduce portion sizes of meat to 3oz or less, increase intake of fish, shellfish, increase intake of meatless meals, use low fat dairy, reduce cheese intake or use reduced-fat, eat 3 or more servings of whole grains a day, Eat 4-5 cups of fruits and vegetables daily, use olive or canola oil in baking, eliminate butter, use fat-free dressings/monsivais or seldom use, choose healthy snacks: light popcorn, plain pretzels, seldom eat or choose low fat ice-cream, fruit juice bars or frozen yogurt , eliminate or choose low-fat sweets and daily saturated fat intake <7%/13g    Measurable goals were based Rate Your Plate Dietary Self-Assessment. These are the areas in which the patient could score higher on the assessment. Goals include recommendations for a heart healthy diet based on American Heart Association. Progression Toward Goals: Pt is progressing and showing improvement  toward the following goals:  reports doing well with diet and is trying to make changes to decrease saturated fat and salt intake. Education: heart healthy eating  low sodium diet  hydration  nutrition for  lipid management  nutrition for Improved BG control  target goal for A1c <7.0  exercise and diabetes management   wt. loss   portion control  education class: Heart Healthy Eating  education class:  Label Reading  Plan: Education class: Reading Food Labels, Education Class: Heart Healthy Eating, switch to low fat cheeses, replace butter with soft spreads made with olive oil, canola or yogurt, replace refined grain bread with whole grain bread, replace unhealthy snacks with fruits & vegs, reduce portion sizes, reduce red meat 1x/wk, switch to skim or 1% milk, eat fewer desserts and sweets, avoid processed foods, remove salt shaker from table, use salt substitute like Mrs.  Dash, increase utilization of fresh or dried herbs, will try new grains like brown rice, quinoa, farro, drink more water, learn how to read food labels and keep added daily sugar <25g/day  Readiness to change: Action:  (Changing behavior)      PSYCHOSOCIAL ASSESSMENT AND PLAN    Emotional:  Depression assessment:  PHQ-9 = 3  1-4 = Minimal Depression            Anxiety measure:  GREG-7 = 0  0-4  = Not anxious  Self-reported stress level:  2  Social support: Excellent    Goals:  Overall Health in [facility] Score < 3, improved sleep, Improved appetite and increased energy    Progression Toward Goals: Pt is progressing and showing improvement  toward the following goals:  no concerns with depression or anxiety at this time. Has great support from his wife and family. Education: signs/sxs of depression and benefits of a positive support system  Plan: Class: Stress and Your Health, Exercise, Spend time outdoors, Enjoy a hobby, Keep a positive mindset and Enjoy family  Readiness to change: Action:  (Changing behavior)      OTHER CORE COMPONENTS     Tobacco:   Social History     Tobacco Use   Smoking Status Never   Smokeless Tobacco Never       Tobacco Use Intervention:   N/A:  Patient is a non-smoker     Anginal Symptoms:  nausea/vomiting and upper back  pain   NTG use: No prescription    Blood pressure:    Restin//80   Exercise: 116//84    Goals: consistent BP < 130/80, reduced dietary sodium <2300mg, moderate intensity exercise >150 mins/wk, medication compliance and reduce number of medications  needed for BP control    Progression Toward Goals: Pt is progressing and showing improvement  toward the following goals:  BP is stable within normal resting limits, reports feeling better since stopping entresto.         Education:  understanding high blood pressure and it's relationship to CAD, low sodium diet and HTN, Education class:  Common Heart Medications and Education class: Understanding Heart Disease  Plan: Class: Understanding Heart Disease, Class: Common Heart Medications, avoid places with second hand smoke, Avoid Processed foods, engage in regular exercise, eliminate salt shaker at the table, use salt substitutes, check labels for sodium content and monitor home BP  Readiness to change: action

## 2023-08-11 ENCOUNTER — CLINICAL SUPPORT (OUTPATIENT)
Dept: CARDIAC REHAB | Facility: HOSPITAL | Age: 58
End: 2023-08-11
Attending: INTERNAL MEDICINE
Payer: COMMERCIAL

## 2023-08-11 DIAGNOSIS — I21.3 ST ELEVATION MYOCARDIAL INFARCTION (STEMI), UNSPECIFIED ARTERY (HCC): Primary | ICD-10-CM

## 2023-08-11 PROCEDURE — 93798 PHYS/QHP OP CAR RHAB W/ECG: CPT

## 2023-08-15 ENCOUNTER — CLINICAL SUPPORT (OUTPATIENT)
Dept: CARDIAC REHAB | Facility: HOSPITAL | Age: 58
End: 2023-08-15
Attending: INTERNAL MEDICINE
Payer: COMMERCIAL

## 2023-08-15 DIAGNOSIS — I21.3 ST ELEVATION MYOCARDIAL INFARCTION (STEMI), UNSPECIFIED ARTERY (HCC): Primary | ICD-10-CM

## 2023-08-15 PROCEDURE — 93798 PHYS/QHP OP CAR RHAB W/ECG: CPT

## 2023-08-15 NOTE — PROGRESS NOTES
Exercise session details     Saw today during weight training and Lifecycle - runs of SVT. Patient asymptomatic. He reports being out in the sun/humidty the past few days. Encouraged hydration and some electrolytes. Will continue to monitor next session. See attached strips.  Dr. Lizbeth Fagan is away this week, last Cardiology visit was with Estelita Waters PA-C.

## 2023-08-17 ENCOUNTER — CLINICAL SUPPORT (OUTPATIENT)
Dept: CARDIAC REHAB | Facility: HOSPITAL | Age: 58
End: 2023-08-17
Attending: INTERNAL MEDICINE
Payer: COMMERCIAL

## 2023-08-17 DIAGNOSIS — I21.3 ST ELEVATION MYOCARDIAL INFARCTION (STEMI), UNSPECIFIED ARTERY (HCC): Primary | ICD-10-CM

## 2023-08-17 DIAGNOSIS — I21.3 ST ELEVATION MYOCARDIAL INFARCTION (STEMI), UNSPECIFIED ARTERY (HCC): ICD-10-CM

## 2023-08-17 PROCEDURE — 93798 PHYS/QHP OP CAR RHAB W/ECG: CPT

## 2023-08-17 RX ORDER — METOPROLOL SUCCINATE 25 MG/1
12.5 TABLET, EXTENDED RELEASE ORAL DAILY
Qty: 45 TABLET | Refills: 3 | Status: SHIPPED | OUTPATIENT
Start: 2023-08-17

## 2023-08-17 RX ORDER — ATORVASTATIN CALCIUM 80 MG/1
80 TABLET, FILM COATED ORAL
Qty: 90 TABLET | Refills: 3 | Status: SHIPPED | OUTPATIENT
Start: 2023-08-17

## 2023-08-18 ENCOUNTER — CLINICAL SUPPORT (OUTPATIENT)
Dept: CARDIAC REHAB | Facility: HOSPITAL | Age: 58
End: 2023-08-18
Attending: INTERNAL MEDICINE
Payer: COMMERCIAL

## 2023-08-18 DIAGNOSIS — I21.3 ST ELEVATION MYOCARDIAL INFARCTION (STEMI), UNSPECIFIED ARTERY (HCC): Primary | ICD-10-CM

## 2023-08-18 PROCEDURE — 93798 PHYS/QHP OP CAR RHAB W/ECG: CPT

## 2023-08-22 ENCOUNTER — CLINICAL SUPPORT (OUTPATIENT)
Dept: CARDIAC REHAB | Facility: HOSPITAL | Age: 58
End: 2023-08-22
Attending: INTERNAL MEDICINE
Payer: COMMERCIAL

## 2023-08-22 DIAGNOSIS — I21.3 ST ELEVATION MYOCARDIAL INFARCTION (STEMI), UNSPECIFIED ARTERY (HCC): Primary | ICD-10-CM

## 2023-08-22 PROCEDURE — 93798 PHYS/QHP OP CAR RHAB W/ECG: CPT

## 2023-08-24 ENCOUNTER — PREP FOR PROCEDURE (OUTPATIENT)
Dept: PLASTIC SURGERY | Facility: CLINIC | Age: 58
End: 2023-08-24

## 2023-08-24 ENCOUNTER — CLINICAL SUPPORT (OUTPATIENT)
Dept: CARDIAC REHAB | Facility: HOSPITAL | Age: 58
End: 2023-08-24
Attending: INTERNAL MEDICINE
Payer: COMMERCIAL

## 2023-08-24 DIAGNOSIS — I21.3 ST ELEVATION MYOCARDIAL INFARCTION (STEMI), UNSPECIFIED ARTERY (HCC): Primary | ICD-10-CM

## 2023-08-24 DIAGNOSIS — R22.2 SUBCUTANEOUS MASS OF BACK: ICD-10-CM

## 2023-08-24 DIAGNOSIS — L98.9 SKIN LESION OF CHEST WALL: Primary | ICD-10-CM

## 2023-08-24 PROCEDURE — 93798 PHYS/QHP OP CAR RHAB W/ECG: CPT

## 2023-08-25 ENCOUNTER — CLINICAL SUPPORT (OUTPATIENT)
Dept: CARDIAC REHAB | Facility: HOSPITAL | Age: 58
End: 2023-08-25
Attending: INTERNAL MEDICINE
Payer: COMMERCIAL

## 2023-08-25 ENCOUNTER — TELEPHONE (OUTPATIENT)
Dept: CARDIOLOGY CLINIC | Facility: CLINIC | Age: 58
End: 2023-08-25

## 2023-08-25 DIAGNOSIS — I21.3 ST ELEVATION MYOCARDIAL INFARCTION (STEMI), UNSPECIFIED ARTERY (HCC): Primary | ICD-10-CM

## 2023-08-25 PROCEDURE — 93798 PHYS/QHP OP CAR RHAB W/ECG: CPT

## 2023-08-25 NOTE — TELEPHONE ENCOUNTER
Fax received from Dr. Posadas Single office -     Requesting clearance for excision of chest lesion etc on 10/13/23. Also requesting med hold - Brilinta. Pt has not been seen in office by physician as of yet, only for hospital follow up with [facility]. Will hold clearance request with pre-charting for OV scheduled 10/5/23 w/ Dr. Ny Cabrera.

## 2023-08-29 ENCOUNTER — OFFICE VISIT (OUTPATIENT)
Dept: INTERNAL MEDICINE CLINIC | Facility: CLINIC | Age: 58
End: 2023-08-29
Payer: COMMERCIAL

## 2023-08-29 ENCOUNTER — CLINICAL SUPPORT (OUTPATIENT)
Dept: CARDIAC REHAB | Facility: HOSPITAL | Age: 58
End: 2023-08-29
Attending: INTERNAL MEDICINE
Payer: COMMERCIAL

## 2023-08-29 VITALS
DIASTOLIC BLOOD PRESSURE: 80 MMHG | HEIGHT: 67 IN | OXYGEN SATURATION: 97 % | SYSTOLIC BLOOD PRESSURE: 120 MMHG | BODY MASS INDEX: 29.73 KG/M2 | WEIGHT: 189.4 LBS | TEMPERATURE: 96.9 F | HEART RATE: 60 BPM

## 2023-08-29 DIAGNOSIS — I21.3 ST ELEVATION MYOCARDIAL INFARCTION (STEMI), UNSPECIFIED ARTERY (HCC): Primary | ICD-10-CM

## 2023-08-29 DIAGNOSIS — K64.1 GRADE II HEMORRHOIDS: ICD-10-CM

## 2023-08-29 DIAGNOSIS — I25.10 CORONARY ARTERY DISEASE INVOLVING NATIVE CORONARY ARTERY OF NATIVE HEART WITHOUT ANGINA PECTORIS: Primary | ICD-10-CM

## 2023-08-29 PROCEDURE — 99213 OFFICE O/P EST LOW 20 MIN: CPT

## 2023-08-29 PROCEDURE — 93798 PHYS/QHP OP CAR RHAB W/ECG: CPT

## 2023-08-29 NOTE — PROGRESS NOTES
INTERNAL MEDICINE FOLLOW-UP OFFICE VISIT  [facility] - [facility] INTERNAL MEDICINE NURYS    NAME: Jewel Ariza  AGE: 63 y.o. SEX: male  : 1965     DATE: 2023     Assessment and Plan:     1. Coronary artery disease involving native coronary artery of native heart without angina pectoris  Assessment & Plan:  · Patient presents for follow-up after recent STEMI requiring PCI and stent placement in the LAD in 2023. · He endorses feeling well since his hospitalization and has been compliant on medications including aspirin, Brilinta, high-intensity atorvastatin, and metoprolol succinate. He continues to attend cardiac rehabilitation. · Lipid panel on  showed normal total cholesterol, triglyceride elevated at 166, HDL low at 33, and normal LDL  · Hgb A1c on  in the pre-diabetic range     Plan:  · We provided the patient education on the importance of continuing his current dose of atorvastatin to prevent future risk of atherosclerosis and heart attacks as he is in the high-risk category. All questions and concerns regarding cognitive decline and dementia attributed to statins were addressed. · We advised him to continue taking DAPT in addition to metoprolol succinate. · He has a follow-up echocardiogram. We will follow-up with results. · Continue outpatient follow-up with cardiology  · We will follow-up in the office for annual physical      2. Grade II hemorrhoids  Assessment & Plan:  · Patient continues to endorse BRBPR associated with hemorrhoidal bleeding. He notices the bleeding particularly when taking aspirin in the past.  · We advised him to continue to monitor his bowel movements and to notify our office if there is significant quantity of bleeding per rectum. We informed him we would send a referral to colorectal surgery if needed. · We provided education on the importance of continuing both aspirin and Brilinta despite his rectal bleeding.           No follow-ups on file. Chief Complaint:     Chief Complaint   Patient presents with   • Follow-up     Stent placement in June heart attack        History of Present Illness:     Adan Raymundo is a 63year old male with PMH of CAD, MI, dyslipidemia, CKD stage 3A, sciatica, prediabetes, and HTN who presents to the office for follow-up. He had PCI with placement of a stent in his LAD in June 2023. He continues to attend cardiac rehab and had his 20th visit today since his STEMI. He states he has been feeling well. States he stopped taking Entresto due to side effects of "fuzziness" and lightheadedness and chest tightness. He continues to take aspirin and Brilinta with good compliance. He does have concern over taking a statin due to concern with the medication contributing to cognitive decline and dementia and would like to reduce the dose if possible. He states he has been mindful of his diet and has been cutting out junk food. He does endorse having BRBPR due to internal hemorrhoids for which he has gotten one removed. He notices the bleeding only when he takes aspirin. He denies any myalgias on his current statin dose. The following portions of the patient's history were reviewed and updated as appropriate: allergies, current medications, past family history, past medical history, past social history, past surgical history and problem list.     Review of Systems:     Review of Systems   Constitutional: Negative for chills and fever. Eyes: Negative for visual disturbance. Respiratory: Negative for chest tightness and shortness of breath. Cardiovascular: Negative for chest pain and palpitations. Gastrointestinal: Positive for blood in stool. Negative for nausea and vomiting. Genitourinary: Positive for decreased urine volume. Negative for difficulty urinating and frequency. Musculoskeletal: Negative for arthralgias and myalgias. Skin: Negative for color change and rash.    Neurological: Negative for dizziness, weakness and light-headedness. Psychiatric/Behavioral: Negative for behavioral problems and confusion. Problem List:     Patient Active Problem List   Diagnosis   • Hyperglyceridemia, pure   • Impaired fasting glucose   • Chronic right-sided low back pain without sciatica   • Right flank pain   • Stage 3a chronic kidney disease (HCC)   • Elevated blood-pressure reading without diagnosis of hypertension   • Bilateral renal cysts   • Grade II hemorrhoids   • History of colonic polyps   • History of urinary stone   • Kidney stone   • Elevated bilirubin   • Mixed hyperlipidemia   • Chronic kidney disease-mineral and bone disorder   • Back strain   • Sciatic pain, right   • Acute ST elevation myocardial infarction (STEMI) due to occlusion of left anterior descending (LAD) coronary artery ([address])   • Coronary artery disease involving native coronary artery of native heart without angina pectoris   • Ischemic heart disease   • Skin lesion of chest wall        Objective:     /80 (BP Location: Left arm, Patient Position: Sitting, Cuff Size: Large)   Pulse 60   Temp (!) 96.9 °F (36.1 °C) (Tympanic)   Ht 5' 6.75" (1.695 m)   Wt 85.9 kg (189 lb 6.4 oz)   SpO2 97%   BMI 29.89 kg/m²     Physical Exam    Pertinent Laboratory/Diagnostic Studies:    Laboratory Results: I have personally reviewed the pertinent laboratory results/reports     Endocrine Studies:   Results from Last 12 Months   Lab Units 06/19/23  0245 02/28/23  0840   HEMOGLOBIN A1C % 5.6  --    TRIGLYCERIDES mg/dL 166*  --    CHOLESTEROL mg/dL 151  --    HDL mg/dL 33*  --    LDL CALC mg/dL 85  --    VIT D 25 HYDROXY ng/mL  --  15.5*   PTH pg/mL  --  47.0       Radiology/Other Diagnostic Testing Results: I have personally reviewed pertinent reports.       [address], DO  [facility] INTERNAL MEDICINE [place] Trejo

## 2023-08-29 NOTE — ASSESSMENT & PLAN NOTE
· Patient continues to endorse BRBPR associated with hemorrhoidal bleeding. He notices the bleeding particularly when taking aspirin in the past.  · We advised him to continue to monitor his bowel movements and to notify our office if there is significant quantity of bleeding per rectum. We informed him we would send a referral to colorectal surgery if needed. · We provided education on the importance of continuing both aspirin and Brilinta despite his rectal bleeding.

## 2023-08-29 NOTE — ASSESSMENT & PLAN NOTE
· Patient presents for follow-up after recent STEMI requiring PCI and stent placement in the LAD in June 2023. · He endorses feeling well since his hospitalization and has been compliant on medications including aspirin, Brilinta, high-intensity atorvastatin, and metoprolol succinate. He continues to attend cardiac rehabilitation. · Lipid panel on 6/19 showed normal total cholesterol, triglyceride elevated at 166, HDL low at 33, and normal LDL  · Hgb A1c on 6/19 in the pre-diabetic range     Plan:  · We provided the patient education on the importance of continuing his current dose of atorvastatin to prevent future risk of atherosclerosis and heart attacks as he is in the high-risk category. All questions and concerns regarding cognitive decline and dementia attributed to statins were addressed. · We advised him to continue taking DAPT in addition to metoprolol succinate. · He has a follow-up echocardiogram. We will follow-up with results.   · Continue outpatient follow-up with cardiology  · We will follow-up in the office for annual physical

## 2023-08-31 ENCOUNTER — CLINICAL SUPPORT (OUTPATIENT)
Dept: CARDIAC REHAB | Facility: HOSPITAL | Age: 58
End: 2023-08-31
Attending: INTERNAL MEDICINE
Payer: COMMERCIAL

## 2023-08-31 DIAGNOSIS — I21.3 ST ELEVATION MYOCARDIAL INFARCTION (STEMI), UNSPECIFIED ARTERY (HCC): Primary | ICD-10-CM

## 2023-08-31 PROCEDURE — 93798 PHYS/QHP OP CAR RHAB W/ECG: CPT

## 2023-09-01 ENCOUNTER — CLINICAL SUPPORT (OUTPATIENT)
Dept: CARDIAC REHAB | Facility: HOSPITAL | Age: 58
End: 2023-09-01
Attending: INTERNAL MEDICINE
Payer: COMMERCIAL

## 2023-09-01 DIAGNOSIS — I21.3 ST ELEVATION MYOCARDIAL INFARCTION (STEMI), UNSPECIFIED ARTERY (HCC): Primary | ICD-10-CM

## 2023-09-01 PROCEDURE — 93798 PHYS/QHP OP CAR RHAB W/ECG: CPT

## 2023-09-07 ENCOUNTER — CLINICAL SUPPORT (OUTPATIENT)
Dept: CARDIAC REHAB | Facility: HOSPITAL | Age: 58
End: 2023-09-07
Attending: INTERNAL MEDICINE
Payer: COMMERCIAL

## 2023-09-07 DIAGNOSIS — I21.3 ST ELEVATION MYOCARDIAL INFARCTION (STEMI), UNSPECIFIED ARTERY (HCC): Primary | ICD-10-CM

## 2023-09-07 PROCEDURE — 93798 PHYS/QHP OP CAR RHAB W/ECG: CPT

## 2023-09-08 ENCOUNTER — CLINICAL SUPPORT (OUTPATIENT)
Dept: CARDIAC REHAB | Facility: HOSPITAL | Age: 58
End: 2023-09-08
Attending: INTERNAL MEDICINE
Payer: COMMERCIAL

## 2023-09-08 DIAGNOSIS — I21.3 ST ELEVATION MYOCARDIAL INFARCTION (STEMI), UNSPECIFIED ARTERY (HCC): Primary | ICD-10-CM

## 2023-09-08 PROCEDURE — 93798 PHYS/QHP OP CAR RHAB W/ECG: CPT

## 2023-09-08 NOTE — PROGRESS NOTES
Cardiac Rehabilitation Plan of Care   60 Day Reassessment          Today's date: 2023   # of Exercise Sessions Completed: 24  Patient name: Vasyl Cortez      : 1965  Age: 63 y.o. MRN: [id number]  Referring Physician: Fouzia Rubio MD  Cardiologist: Dr. Mayela Yao (waiting to pick a provider)   Provider: [place]  Clinician: Kathrine Albright MS, CEP      Dx:   Encounter Diagnosis   Name Primary? • ST elevation myocardial infarction (STEMI), unspecified artery ([address]) Yes     Date of onset: 2023      SUMMARY OF PROGRESS:  Mr. Mariama Ceja has started his cardiac rehabilitation program post STEMI and PCIx1 to proximal LAD. He was having radiating upper back pain and vomiting that led him into the hospital. He was also found to have ischemic cardiomyopathy with EF: 35%. He has completed 24 sessions over the past 60 days. He is doing well overall and is tolerating exercise sessions well. He is completing 45-50 min of aerobic exercise and is increasing his/ exercise intensities and times gradually as he tolerates. He is now reaching 6.1 METs and shows normal hemodynamic response to exercise. BP is 102//80 at rest. Telemetry shows NSR with rare PVCs. He has not started home exercise program, but he has been encouraged to start walking at home 20-30 min with goal of reaching 150-200 min/week of aerobic exercise. His goal for his rehab program is to improve his EF from 35%. He reports he is on target with personal goals at 60 days. He reports feeling very good with the exercise and can feel it is helping him. He reports he will have repeat echo . Initial depression screening using the PHQ-9 interprets the patient's score of  3. GREG-7 screening tool for anxiety suggests 0. PHQ-9 will not need to be reassessed every 30 days due to no concern with depression or anxiety. He reports excellent social/emotional support from family and friends.   Information to utilize [employer] was provided as well as contact information for counseling through [facility]. His CAD risk factors include: inactivity, obesity/overweight, hypertension, hyperlipidemia and diabetes. He admits to 100% medication compliance. He is attending weekly group education classes on heart healthy eating, reading food labels, stress management, risk factor reduction, understanding heart disease and common heart medications. He is in agreement to continue cardiac rehab sessions attending 3x/week at this time with goal of reaching 36 sessions. Will continue to monitor and progress exercise over next 30 days of rehab.       Medication compliance: Yes   Comments: Pt reports to be compliant with medications               23: to stop taking Entresto per Bobby MENESES-C  Fall Risk: Low   Comments: Ambulates with a steady gait with no assist device    EKG Interpretation: NSR w/ occ PVCs, run of sinus tach/SVT, bigeminy PACs      EXERCISE ASSESSMENT and PLAN    Exercise Prescription:      Frequency: 3 days/week   Supplement with home exercise 2+ days/wk as tolerated       Minutes: 30-60         METS: 2.0-6.5            HR: RHR+ 30 beats above    RPE: 4-6         Modalities: Treadmill, UBE, Lifecycle, NuStep and Recumbent bike      30 Day Goals for Exercise Progression:    Frequency: 3 days/week of cardiac rehab       Supplement with home exercise 2+ days/wk as tolerated    Minutes: 50-60                             >150 mins/wk of moderate intensity exercise   METS: 6.1   HR: RHR+ 30 beats above     RPE: 3-4   Modalities: Treadmill, UBE, Lifecycle, Elliptical, Rower, NuStep and Recumbent bike    Strength trainin-3 days / week  12-15 repetitions  1-2 sets per modality   Will be added following 2-3 weeks of monitored exercise sessions   Modalities: Pull Downs, Lateral Raise, Arm Extension, Arm Curl, Upright Rows, Front Raises, Shoulder Shrugs and leg extension     Home Exercise: none-have encouraged walking 20-30 min on non-rehab days with goal of reaching 150-200 min/week of aerobic exercise    Goals: 10% improvement in functional capacity - based on max METs achieved in fitness assessment, Reduced dyspnea with physical activity  0-1/10, improved DASI score by 10%, Increase in exercise capacity by 40% - based on peak METs tolerated in cardiac rehab exercise session, Exercise 5 days/wk, >150mins/wk of moderate intensity exercise, Resume ADLs with increased strength, Return to work unrestricted, Attend Rehab regularly, Start a walking program and start a home exercise program    Progression Toward Goals:  Pt is progressing and showing improvement  toward the following goals:  increasing overall strength and endurance with regular exercise, improving functional capacity shown by increasing MET levels, attending CR sessions 3x/week regularly, no concerns at this time.         Education: benefit of exercise for CAD risk factors, home exercise guidelines, AHA guidelines to achieve >150 mins/wk of moderate exercise, RPE scale, class: Risk Factors for Heart Disease, exercise instructions/guidelines for discharge  and physical activity/exercise in extreme weather conditions   Plan:education on home exercise guidelines, home exercise 30+ mins 2 days opposite CR and Education class: Risk Factors for Heart Disease  Readiness to change: Preparation:  (Getting ready to change)       NUTRITION ASSESSMENT AND PLAN    Weight control:    Starting weight: 187 lbs    Current weight: 186 lbs    Diabetes: Pre-diabetes  A1c: 5.6     last measured: 06/19/2023    Lipid management: Discussed diet and lipid management and Last lipid profile 06/19/2023  Chol 151    HDL 33  LDL 85    Goals:reduced BMI to < 25, LDL <100, HDL >40, TRG <150, CHOL <200, decreased body fat% <25%   (M), reduced waist circumference <40 inches (M), fasting BG , Improved Rate Your Plate score  >69, 8.6-8%  wt loss, choose lean meat (93-95%), reduce portion sizes of meat to 3oz or less, increase intake of fish, shellfish, increase intake of meatless meals, use low fat dairy, reduce cheese intake or use reduced-fat, eat 3 or more servings of whole grains a day, Eat 4-5 cups of fruits and vegetables daily, use olive or canola oil in baking, eliminate butter, use fat-free dressings/monsivais or seldom use, choose healthy snacks: light popcorn, plain pretzels, seldom eat or choose low fat ice-cream, fruit juice bars or frozen yogurt , eliminate or choose low-fat sweets and daily saturated fat intake <7%/13g    Measurable goals were based Rate Your Plate Dietary Self-Assessment. These are the areas in which the patient could score higher on the assessment. Goals include recommendations for a heart healthy diet based on American Heart Association. Progression Toward Goals: Pt is progressing and showing improvement  toward the following goals:  reports doing well with diet and is trying to make changes to decrease saturated fat and salt intake. Weight is remaining stable      Education: heart healthy eating  low sodium diet  hydration  nutrition for  lipid management  nutrition for Improved BG control  target goal for A1c <7.0  exercise and diabetes management   wt. loss   portion control  education class: Heart Healthy Eating  education class:  Label Reading  Plan: Education class: Reading Food Labels, Education Class: Heart Healthy Eating, switch to low fat cheeses, replace butter with soft spreads made with olive oil, canola or yogurt, replace refined grain bread with whole grain bread, replace unhealthy snacks with fruits & vegs, reduce portion sizes, reduce red meat 1x/wk, switch to skim or 1% milk, eat fewer desserts and sweets, avoid processed foods, remove salt shaker from table, use salt substitute like Mrs. Dye, increase utilization of fresh or dried herbs, will try new grains like brown rice, quinoa, farro, drink more water, learn how to read food labels and keep added daily sugar <25g/day  Readiness to change: Action:  (Changing behavior)      PSYCHOSOCIAL ASSESSMENT AND PLAN    Emotional:  Depression assessment:  PHQ-9 = 3  1-4 = Minimal Depression            Anxiety measure:  GREG-7 = 0  0-4  = Not anxious  Self-reported stress level:  2  Social support: Excellent    Goals:  Overall Health in [facility] Score < 3, improved sleep, Improved appetite and increased energy    Progression Toward Goals: Pt is progressing and showing improvement  toward the following goals:  no concerns with depression or anxiety at this time. Has great support from his wife and family. Education: signs/sxs of depression and benefits of a positive support system  Plan: Class: Stress and Your Health, Exercise, Spend time outdoors, Enjoy a hobby, Keep a positive mindset and Enjoy family  Readiness to change: Action:  (Changing behavior)      OTHER CORE COMPONENTS     Tobacco:   Social History     Tobacco Use   Smoking Status Never   Smokeless Tobacco Never       Tobacco Use Intervention:   N/A:  Patient is a non-smoker     Anginal Symptoms:  nausea/vomiting and upper back  pain   NTG use: No prescription    Blood pressure:    Restin//80   Exercise: 116//84    Goals: consistent BP < 130/80, reduced dietary sodium <2300mg, moderate intensity exercise >150 mins/wk, medication compliance and reduce number of medications  needed for BP control    Progression Toward Goals: Pt is progressing and showing improvement  toward the following goals:  BP is stable within normal resting limits, reports feeling better since stopping entresto.         Education:  understanding high blood pressure and it's relationship to CAD, low sodium diet and HTN, Education class:  Common Heart Medications and Education class: Understanding Heart Disease  Plan: Class: Understanding Heart Disease, Class: Common Heart Medications, avoid places with second hand smoke, Avoid Processed foods, engage in regular exercise, eliminate salt shaker at the table, use salt substitutes, check labels for sodium content and monitor home BP  Readiness to change: action

## 2023-09-12 ENCOUNTER — CLINICAL SUPPORT (OUTPATIENT)
Dept: CARDIAC REHAB | Facility: HOSPITAL | Age: 58
End: 2023-09-12
Attending: INTERNAL MEDICINE
Payer: COMMERCIAL

## 2023-09-12 DIAGNOSIS — I21.3 ST ELEVATION MYOCARDIAL INFARCTION (STEMI), UNSPECIFIED ARTERY (HCC): Primary | ICD-10-CM

## 2023-09-12 PROCEDURE — 93798 PHYS/QHP OP CAR RHAB W/ECG: CPT

## 2023-09-14 ENCOUNTER — CLINICAL SUPPORT (OUTPATIENT)
Dept: CARDIAC REHAB | Facility: HOSPITAL | Age: 58
End: 2023-09-14
Attending: INTERNAL MEDICINE
Payer: COMMERCIAL

## 2023-09-14 ENCOUNTER — TELEPHONE (OUTPATIENT)
Age: 58
End: 2023-09-14

## 2023-09-14 DIAGNOSIS — I21.3 ST ELEVATION MYOCARDIAL INFARCTION (STEMI), UNSPECIFIED ARTERY (HCC): Primary | ICD-10-CM

## 2023-09-14 PROCEDURE — 93798 PHYS/QHP OP CAR RHAB W/ECG: CPT

## 2023-09-15 ENCOUNTER — CLINICAL SUPPORT (OUTPATIENT)
Dept: CARDIAC REHAB | Facility: HOSPITAL | Age: 58
End: 2023-09-15
Attending: INTERNAL MEDICINE
Payer: COMMERCIAL

## 2023-09-15 DIAGNOSIS — I21.3 ST ELEVATION MYOCARDIAL INFARCTION (STEMI), UNSPECIFIED ARTERY (HCC): Primary | ICD-10-CM

## 2023-09-15 PROCEDURE — 93798 PHYS/QHP OP CAR RHAB W/ECG: CPT

## 2023-09-18 ENCOUNTER — TELEPHONE (OUTPATIENT)
Dept: NEPHROLOGY | Facility: CLINIC | Age: 58
End: 2023-09-18

## 2023-09-19 ENCOUNTER — CLINICAL SUPPORT (OUTPATIENT)
Dept: CARDIAC REHAB | Facility: HOSPITAL | Age: 58
End: 2023-09-19
Attending: INTERNAL MEDICINE
Payer: COMMERCIAL

## 2023-09-19 DIAGNOSIS — I21.3 ST ELEVATION MYOCARDIAL INFARCTION (STEMI), UNSPECIFIED ARTERY (HCC): Primary | ICD-10-CM

## 2023-09-19 PROCEDURE — 93798 PHYS/QHP OP CAR RHAB W/ECG: CPT

## 2023-09-22 ENCOUNTER — CLINICAL SUPPORT (OUTPATIENT)
Dept: CARDIAC REHAB | Facility: HOSPITAL | Age: 58
End: 2023-09-22
Attending: INTERNAL MEDICINE
Payer: COMMERCIAL

## 2023-09-22 ENCOUNTER — TELEPHONE (OUTPATIENT)
Age: 58
End: 2023-09-22

## 2023-09-22 DIAGNOSIS — I21.3 ST ELEVATION MYOCARDIAL INFARCTION (STEMI), UNSPECIFIED ARTERY (HCC): Primary | ICD-10-CM

## 2023-09-22 PROCEDURE — 93798 PHYS/QHP OP CAR RHAB W/ECG: CPT

## 2023-09-22 NOTE — PROGRESS NOTES
Exercise session detail    Please see attached telemetry strips for your records-freq PACS, PVCs, short run PAT

## 2023-09-22 NOTE — TELEPHONE ENCOUNTER
Pts wife Sesar Garcia calling to speak to Jus Lucas about poss rescheduling the date of upcoming procedure    Please call pts wife back at [phone]

## 2023-09-28 ENCOUNTER — TELEPHONE (OUTPATIENT)
Dept: CARDIOLOGY CLINIC | Facility: CLINIC | Age: 58
End: 2023-09-28

## 2023-09-28 NOTE — TELEPHONE ENCOUNTER
Deacon Knight thank you for taking their call. He is nearly done with cardiac rehab and it looks like he is doing well. As long as they can do his procedure while on Aspirin and Brilinta he would not need any cardiac testing beforehand. His risk of tooth infection could be high, so we don't want to delay dental care unless absolutely necessary. If they need to stop his heart medications, we will need to discuss with a cardiologist first.     If any issues just let me know.      Thank you  Allegra Castro

## 2023-09-28 NOTE — TELEPHONE ENCOUNTER
Patient's wife Abram Aceves called stating patient is currently at [facility] awaiting an OV for 11:45 today to hopefully have emergency tooth extraction, since patient is having sever tooth pain. Abram Aceves stated oral surgery needs a Cardiac Clearance for patient to have surgery. Patient is taking daily Brilinta & ASA. Please review CC.     Please advise

## 2023-09-28 NOTE — TELEPHONE ENCOUNTER
Returned call to patient's wife Deshawn Lund & read message of Anabell Longoria PA-C. Deshawn Lund verbalized understanding & stated patient is currently in the chair at [address] [place] [address] having his tooth extracted.

## 2023-09-29 ENCOUNTER — APPOINTMENT (OUTPATIENT)
Dept: CARDIAC REHAB | Facility: HOSPITAL | Age: 58
End: 2023-09-29
Attending: INTERNAL MEDICINE
Payer: COMMERCIAL

## 2023-09-29 ENCOUNTER — APPOINTMENT (OUTPATIENT)
Dept: LAB | Facility: HOSPITAL | Age: 58
End: 2023-09-29
Payer: COMMERCIAL

## 2023-09-29 ENCOUNTER — TELEPHONE (OUTPATIENT)
Dept: CARDIOLOGY CLINIC | Facility: CLINIC | Age: 58
End: 2023-09-29

## 2023-09-29 DIAGNOSIS — I25.9 ISCHEMIC HEART DISEASE: ICD-10-CM

## 2023-09-29 DIAGNOSIS — I25.10 CORONARY ARTERY DISEASE INVOLVING NATIVE CORONARY ARTERY OF NATIVE HEART WITHOUT ANGINA PECTORIS: ICD-10-CM

## 2023-09-29 LAB
ANION GAP SERPL CALCULATED.3IONS-SCNC: 9 MMOL/L
BUN SERPL-MCNC: 20 MG/DL (ref 5–25)
CALCIUM SERPL-MCNC: 9.5 MG/DL (ref 8.4–10.2)
CHLORIDE SERPL-SCNC: 103 MMOL/L (ref 96–108)
CO2 SERPL-SCNC: 28 MMOL/L (ref 21–32)
CREAT SERPL-MCNC: 1.34 MG/DL (ref 0.6–1.3)
GFR SERPL CREATININE-BSD FRML MDRD: 58 ML/MIN/1.73SQ M
GLUCOSE SERPL-MCNC: 106 MG/DL (ref 65–140)
POTASSIUM SERPL-SCNC: 4.9 MMOL/L (ref 3.5–5.3)
SODIUM SERPL-SCNC: 140 MMOL/L (ref 135–147)

## 2023-09-29 PROCEDURE — 80048 BASIC METABOLIC PNL TOTAL CA: CPT

## 2023-09-29 PROCEDURE — 36415 COLL VENOUS BLD VENIPUNCTURE: CPT

## 2023-09-29 NOTE — TELEPHONE ENCOUNTER
----- Message from Alex Adams PA-C sent at 9/28/2023  9:38 AM EDT -----  Regarding: Pre-op for root canal  Hello all,    The attached patient should be reaching out to the office regarding a possible root canal procedure in the next few days. I had seen him in the office a few months ago. He is establishing with Dr. Wes Santana next week. If he reaches out can you forward the info to me in addition to Maria Del Carmen? That way I can discuss with him and maybe he can get his tooth fixed sooner. The patient is actually my neighbor so he let me know of the situation earlier today.

## 2023-10-03 ENCOUNTER — CLINICAL SUPPORT (OUTPATIENT)
Dept: CARDIAC REHAB | Facility: HOSPITAL | Age: 58
End: 2023-10-03
Attending: INTERNAL MEDICINE
Payer: COMMERCIAL

## 2023-10-03 ENCOUNTER — OFFICE VISIT (OUTPATIENT)
Dept: INTERNAL MEDICINE CLINIC | Facility: CLINIC | Age: 58
End: 2023-10-03
Payer: COMMERCIAL

## 2023-10-03 ENCOUNTER — HOSPITAL ENCOUNTER (OUTPATIENT)
Dept: NON INVASIVE DIAGNOSTICS | Age: 58
Discharge: HOME | End: 2023-10-03
Payer: COMMERCIAL

## 2023-10-03 VITALS
RESPIRATION RATE: 20 BRPM | TEMPERATURE: 98 F | OXYGEN SATURATION: 97 % | HEART RATE: 55 BPM | HEIGHT: 67 IN | DIASTOLIC BLOOD PRESSURE: 74 MMHG | BODY MASS INDEX: 29.07 KG/M2 | WEIGHT: 185.2 LBS | SYSTOLIC BLOOD PRESSURE: 119 MMHG

## 2023-10-03 VITALS
SYSTOLIC BLOOD PRESSURE: 119 MMHG | BODY MASS INDEX: 29.66 KG/M2 | HEART RATE: 56 BPM | DIASTOLIC BLOOD PRESSURE: 74 MMHG | HEIGHT: 67 IN | WEIGHT: 189 LBS

## 2023-10-03 DIAGNOSIS — Z00.00 HEALTHCARE MAINTENANCE: ICD-10-CM

## 2023-10-03 DIAGNOSIS — I21.3 ST ELEVATION MYOCARDIAL INFARCTION (STEMI), UNSPECIFIED ARTERY (HCC): Primary | ICD-10-CM

## 2023-10-03 DIAGNOSIS — I25.10 CORONARY ARTERY DISEASE INVOLVING NATIVE CORONARY ARTERY OF NATIVE HEART WITHOUT ANGINA PECTORIS: Primary | ICD-10-CM

## 2023-10-03 DIAGNOSIS — K64.1 GRADE II HEMORRHOIDS: ICD-10-CM

## 2023-10-03 DIAGNOSIS — I21.3 ST ELEVATION MYOCARDIAL INFARCTION (STEMI), UNSPECIFIED ARTERY (HCC): ICD-10-CM

## 2023-10-03 DIAGNOSIS — Z00.00 ANNUAL PHYSICAL EXAM: ICD-10-CM

## 2023-10-03 LAB
AORTIC ROOT: 3.9 CM
AORTIC VALVE MEAN VELOCITY: 7.7 M/S
APICAL FOUR CHAMBER EJECTION FRACTION: 51 %
ASCENDING AORTA: 3.1 CM
AV LVOT MEAN GRADIENT: 2 MMHG
AV LVOT PEAK GRADIENT: 3 MMHG
AV MEAN GRADIENT: 3 MMHG
AV PEAK GRADIENT: 5 MMHG
AV VELOCITY RATIO: 0.81
DOP CALC AO PEAK VEL: 1.13 M/S
DOP CALC AO VTI: 23.74 CM
DOP CALC LVOT PEAK VEL VTI: 20.4 CM
DOP CALC LVOT PEAK VEL: 0.91 M/S
DOP CALC MV VTI: 26.59 CM
E WAVE DECELERATION TIME: 181 MS
FRACTIONAL SHORTENING: 22 (ref 28–44)
GLOBAL LONGITUIDAL STRAIN: -11 %
INTERVENTRICULAR SEPTUM IN DIASTOLE (PARASTERNAL SHORT AXIS VIEW): 0.9 CM
INTERVENTRICULAR SEPTUM: 0.9 CM (ref 0.6–1.1)
LEFT ATRIUM SIZE: 3.5 CM
LEFT INTERNAL DIMENSION IN SYSTOLE: 4 CM (ref 2.1–4)
LEFT VENTRICLE DIASTOLIC VOLUME (MOD BIPLANE): 130 ML
LEFT VENTRICLE SYSTOLIC VOLUME (MOD BIPLANE): 66 ML
LEFT VENTRICULAR INTERNAL DIMENSION IN DIASTOLE: 5.1 CM (ref 3.5–6)
LEFT VENTRICULAR POSTERIOR WALL IN END DIASTOLE: 0.6 CM
LEFT VENTRICULAR STROKE VOLUME: 57 ML
LV EF: 49 %
LVSV (TEICH): 57 ML
MV E'TISSUE VEL-LAT: 11 CM/S
MV E'TISSUE VEL-SEP: 9 CM/S
MV MEAN GRADIENT: 1 MMHG
MV PEAK A VEL: 0.59 M/S
MV PEAK E VEL: 76 CM/S
MV PEAK GRADIENT: 3 MMHG
MV STENOSIS PRESSURE HALF TIME: 52 MS
MV VALVE AREA P 1/2 METHOD: 4.23
RA PRESSURE ESTIMATED: 3 MMHG
RIGHT ATRIAL 2D VOLUME: 32 ML
RIGHT ATRIUM AREA SYSTOLE A4C: 13.8 CM2
RIGHT VENTRICLE ID DIMENSION: 3.8 CM
RV PSP: 20 MMHG
SINOTUBULAR JUNCTION: 2.8 CM
SL CV LV EF: 45
SL CV PED ECHO LEFT VENTRICLE DIASTOLIC VOLUME (MOD BIPLANE) 2D: 125 ML
SL CV PED ECHO LEFT VENTRICLE SYSTOLIC VOLUME (MOD BIPLANE) 2D: 68 ML
SL CV SINUS OF VALSALVA 2D: 3.6 CM
STJ: 2.8 CM
TR MAX PG: 17 MMHG
TR PEAK VELOCITY: 2.1 M/S
TRICUSPID ANNULAR PLANE SYSTOLIC EXCURSION: 2.5 CM
TRICUSPID VALVE PEAK REGURGITATION VELOCITY: 2.08 M/S

## 2023-10-03 PROCEDURE — 99214 OFFICE O/P EST MOD 30 MIN: CPT

## 2023-10-03 PROCEDURE — 93356 MYOCRD STRAIN IMG SPCKL TRCK: CPT | Performed by: INTERNAL MEDICINE

## 2023-10-03 PROCEDURE — 93798 PHYS/QHP OP CAR RHAB W/ECG: CPT

## 2023-10-03 PROCEDURE — 93356 MYOCRD STRAIN IMG SPCKL TRCK: CPT

## 2023-10-03 PROCEDURE — 93306 TTE W/DOPPLER COMPLETE: CPT

## 2023-10-03 PROCEDURE — 93306 TTE W/DOPPLER COMPLETE: CPT | Performed by: INTERNAL MEDICINE

## 2023-10-03 RX ORDER — PENICILLIN V POTASSIUM 500 MG/1
500 TABLET ORAL 4 TIMES DAILY
COMMUNITY
Start: 2023-09-27

## 2023-10-03 NOTE — PROGRESS NOTES
INTERNAL MEDICINE HEALTH MAINTENANCE OFFICE VISIT  [facility] - [facility] INTERNAL MEDICINE NURYS    NAME: Altaf Stahl  AGE: 63 y.o. SEX: male  : 1965     DATE: 10/3/2023     Assessment and Plan:     1. Coronary artery disease involving native coronary artery of native heart without angina pectoris  Assessment & Plan:  · Remains compliant on medications including aspirin, Brilinta, high-intensity atorvastatin, and metoprolol succinate. He continues to attend cardiac rehabilitation. • Lipid panel on  showed normal total cholesterol, triglyceride elevated at 166, HDL low at 33, and normal LDL  • Hgb A1c on  is borderline pre-diabetic (5.6)  • Can re-check A1c in a year  • Discussion was held with patient regarding regularly weekly exercise and dietary modification. He has been limiting his carbohydrate and sodium intake and eating more fruits, vegetables, and salmon. 2. Grade II hemorrhoids  Assessment & Plan:  · Continues to endorse small quantity hemorrhoidal bleeding while on aspirin. · Last colonoscopy was approximately 5-6 years ago. · Patient is in agreement to continue aspirin and Brilinta after risks versus benefits discussion. · Informed patient to call our office if there is development of larger quantity bleeding and symptoms attributed to acute blood loss anemia. · Patient wishes to hold off on colorectal surgery referral at this time as quantity of bleeding is mild. 3. Healthcare maintenance        1. Patient Counseling:  · Nutrition: Stressed importance of moderation in sodium/caffeine intake, saturated fat, trans fat and cholesterol. Discussed portion control as well as increasing intake of fruits, vegetables, lean protein, and fish.    · Exercise: Stressed the importance of regular exercise at least 150 mins/week  · Sexuality: Discussed sexually transmitted diseases, partner selection, use of condoms, avoidance of unintended pregnancy  and contraceptive alternatives. · Injury prevention: Discussed safety belts, safety helmets, smoke detector, smoking near bedding or upholstery. · Dental health: Discussed importance of regular tooth brushing, flossing, and dental visits. · Immunizations reviewed. Patient wishes to not obtain the influenza vaccination. He is refusing Tdap booster at this time. · Discussed benefits of screening colonoscopy. · Education was printed for patient    2. Discussed the patient's BMI with him. The BMI is above average; BMI management plan is completed    No follow-ups on file. Chief Complaint:     Chief Complaint   Patient presents with   • Physical Exam        History of Present Illness: Well Adult Physical   Patient here for a comprehensive physical exam. The patient reports continued hemorrhoidal bleeding that is described as bright red but not large in quantity. He does have an extraction of left-sided lipoma coming up on November 17th. He did express some concern with his recent creatinine value and endorsed infrequent and intermittent weak urinary stream. Denies any recent chest pain, shortness of breath, palpitations, dizziness, lightheadedness, and diaphoresis. Diet and Physical Activity  Diet: well balanced diet, has cut down on carbohydrate intake, avoiding sodium intake, limiting intake to 5-10% carbohydrates and sodium of total daily caloric intake  Weight concerns: Patient is overweight (BMI 25.0-29. 9)  Exercise: three days per week      Depression Screen  Negative for depression with PHQ2 score of 0.      General Health  Hearing: Normal:  bilateral  Vision: no vision problems, goes for regular eye exams and next eye exam is scheduled for 10/13  Dental: brushes teeth twice daily and had recent root canal extraction    Reproductive Health  Had some concerns over erectile dysfunction but cutting off on carbohydrates and sodium has been beneficial.    The following portions of the patient's history were reviewed and updated as appropriate: allergies, current medications, past family history, past medical history, past social history, past surgical history and problem list.     Review of Systems:     Review of Systems   Constitutional: Negative for chills and fever. HENT: Negative for ear pain and sore throat. Eyes: Negative for pain and visual disturbance. Respiratory: Negative for cough and shortness of breath. Cardiovascular: Negative for chest pain and palpitations. Gastrointestinal: Positive for anal bleeding and blood in stool. Negative for abdominal pain, constipation, diarrhea, nausea and vomiting. Genitourinary: Negative for dysuria and hematuria. Musculoskeletal: Negative for arthralgias and back pain. Skin: Negative for color change and rash. Neurological: Negative for seizures and syncope. All other systems reviewed and are negative. Past Medical History:     Past Medical History:   Diagnosis Date   • Back ache    • Hemorrhoids    • Hypertension    • Kidney stone    • Kidney stones         Past Surgical History:     Past Surgical History:   Procedure Laterality Date   • CARDIAC CATHETERIZATION N/A 6/18/2023    Procedure: Cardiac pci;  Surgeon: Luca Isaac MD;  Location: [facility];   Service: Cardiology   • NO PAST SURGERIES          Social History:     Social History     Socioeconomic History   • Marital status: /Civil Union     Spouse name: None   • Number of children: None   • Years of education: None   • Highest education level: None   Occupational History   • None   Tobacco Use   • Smoking status: Never     Passive exposure: Never   • Smokeless tobacco: Never   Vaping Use   • Vaping Use: Never used   Substance and Sexual Activity   • Alcohol use: Not Currently     Alcohol/week: 3.0 standard drinks of alcohol     Types: 3 Cans of beer per week     Comment: 2-3 beers a week   • Drug use: Never   • Sexual activity: None   Other Topics Concern   • None Social History Narrative   • None     Social Determinants of Health     Financial Resource Strain: Not on file   Food Insecurity: Not on file   Transportation Needs: Not on file   Physical Activity: Not on file   Stress: Not on file   Social Connections: Not on file   Intimate Partner Violence: Not on file   Housing Stability: Not on file        Family History:     Family History   Problem Relation Age of Onset   • Diabetes Mother    • No Known Problems Father    • Diabetes Sister    • Diabetic kidney disease Sister    • Stroke Sister    • Hyperlipidemia Brother         Current Medications:     Outpatient Medications Prior to Visit   Medication Sig Dispense Refill   • aspirin (ECOTRIN LOW STRENGTH) 81 mg EC tablet Take 1 tablet (81 mg total) by mouth daily 90 tablet 3   • atorvastatin (LIPITOR) 80 mg tablet Take 1 tablet (80 mg total) by mouth daily at bedtime 90 tablet 3   • metoprolol succinate (TOPROL-XL) 25 mg 24 hr tablet Take 0.5 tablets (12.5 mg total) by mouth daily 45 tablet 3   • nitroglycerin (NITROSTAT) 0.4 mg SL tablet Place 1 tablet (0.4 mg total) under the tongue every 5 (five) minutes as needed for chest pain 30 tablet 0   • penicillin V potassium (VEETID) 500 mg tablet Take 500 mg by mouth 4 (four) times a day     • ticagrelor (BRILINTA) 90 MG Take 1 tablet (90 mg total) by mouth every 12 (twelve) hours 180 tablet 3     No facility-administered medications prior to visit. Allergies:      Allergies   Allergen Reactions   • Turmeric - Food Allergy Anaphylaxis   • Niacin Other (See Comments)     Other reaction(s): NIACIN (NIACIN) (facial flushing)   • Fenofibrate Dizziness     Other reaction(s): dizziness        Objective:     Physical Exam:   /74   Pulse 55   Temp 98 °F (36.7 °C)   Resp 20   Ht 5' 7" (1.702 m)   Wt 84 kg (185 lb 3.2 oz)   SpO2 97%   BMI 29.01 kg/m²     General Appearance:    Alert, cooperative, no distress, appears stated age   Head:    Normocephalic, without obvious abnormality, atraumatic   Eyes:    PERRL, conjunctiva/corneas clear, EOM's intact, fundi     benign, both eyes        Ears:    Normal TM's and external ear canals, both ears   Nose:   Nares normal, septum midline, mucosa normal, no drainage    or sinus tenderness   Throat:   Lips, mucosa, and tongue normal; teeth and gums normal, s/p root canal extraction of right upper molar, healing appropriately   Neck:   Supple, symmetrical, trachea midline, no adenopathy;        thyroid:  No enlargement/tenderness/nodules; no carotid    bruit or JVD   Back:     Symmetric, no curvature, ROM normal, no CVA tenderness   Lungs:     Clear to auscultation bilaterally, respirations unlabored   Chest wall:    No tenderness or deformity   Heart:    Regular rate and rhythm, S1 and S2 normal, no murmur, rub   or gallop   Abdomen:     Soft, non-tender, bowel sounds active all four quadrants,     no masses, no organomegaly   Genitalia:    Deferred   Rectal:    Deferred   Extremities:   Extremities normal, atraumatic, no cyanosis or edema   Pulses:   2+ and symmetric all extremities   Skin:   Skin color, texture, turgor normal, no rashes or lesions   Lymph nodes:   Cervical, supraclavicular, and axillary nodes normal   Neurologic:   CNII-XII intact. Normal strength, sensation and reflexes       throughout       Data:    Laboratory Results: I have personally reviewed the pertinent laboratory results/reports   Radiology/Other Diagnostic Testing Results: I have personally reviewed pertinent reports.        Health Maintenance:     Health Maintenance   Topic Date Due   • Hepatitis C Screening  Never done   • Pneumococcal Vaccine: Pediatrics (0 to 5 Years) and At-Risk Patients (6 to 59 Years) (1 - PCV) Never done   • HIV Screening  Never done   • Colorectal Cancer Screening  Never done   • DTaP,Tdap,and Td Vaccines (2 - Td or Tdap) 02/04/2020   • COVID-19 Vaccine (3 - Pfizer series) 08/20/2021   • Annual Physical  06/01/2023   • Influenza Vaccine (1) 09/01/2023   • Depression Screening  10/03/2024   • BMI: Followup Plan  10/03/2024   • BMI: Adult  10/03/2024   • HIB Vaccine  Aged Out   • IPV Vaccine  Aged Out   • Hepatitis A Vaccine  Aged Out   • Meningococcal ACWY Vaccine  Aged Out   • HPV Vaccine  Aged Out     Immunization History   Administered Date(s) Administered   • COVID-19 PFIZER VACCINE 0.3 ML IM 06/03/2021, 06/25/2021   • INFLUENZA 11/01/2009, 11/11/2010   • Tdap 02/04/2010       Nichole Santizo DO  [facility] INTERNAL MEDICINE [place]

## 2023-10-03 NOTE — ASSESSMENT & PLAN NOTE
· Continues to endorse small quantity hemorrhoidal bleeding while on aspirin. · Last colonoscopy was approximately 5-6 years ago. · Patient is in agreement to continue aspirin and Brilinta after risks versus benefits discussion. · Informed patient to call our office if there is development of larger quantity bleeding and symptoms attributed to acute blood loss anemia. · Patient wishes to hold off on colorectal surgery referral at this time as quantity of bleeding is mild.

## 2023-10-03 NOTE — ASSESSMENT & PLAN NOTE
· Remains compliant on medications including aspirin, Brilinta, high-intensity atorvastatin, and metoprolol succinate. He continues to attend cardiac rehabilitation. • Lipid panel on 6/19 showed normal total cholesterol, triglyceride elevated at 166, HDL low at 33, and normal LDL  • Hgb A1c on 6/19 is borderline pre-diabetic (5.6)  • Can re-check A1c in a year  • Discussion was held with patient regarding regularly weekly exercise and dietary modification. He has been limiting his carbohydrate and sodium intake and eating more fruits, vegetables, and salmon.

## 2023-10-05 ENCOUNTER — OFFICE VISIT (OUTPATIENT)
Dept: CARDIOLOGY CLINIC | Facility: CLINIC | Age: 58
End: 2023-10-05
Payer: COMMERCIAL

## 2023-10-05 ENCOUNTER — CLINICAL SUPPORT (OUTPATIENT)
Dept: CARDIAC REHAB | Facility: HOSPITAL | Age: 58
End: 2023-10-05
Attending: INTERNAL MEDICINE
Payer: COMMERCIAL

## 2023-10-05 VITALS
DIASTOLIC BLOOD PRESSURE: 68 MMHG | HEIGHT: 66 IN | WEIGHT: 189.4 LBS | SYSTOLIC BLOOD PRESSURE: 108 MMHG | HEART RATE: 60 BPM | BODY MASS INDEX: 30.44 KG/M2

## 2023-10-05 DIAGNOSIS — R22.2 SUBCUTANEOUS MASS OF BACK: ICD-10-CM

## 2023-10-05 DIAGNOSIS — Z01.810 PREOP CARDIOVASCULAR EXAM: ICD-10-CM

## 2023-10-05 DIAGNOSIS — I25.10 CORONARY ARTERY DISEASE INVOLVING NATIVE CORONARY ARTERY OF NATIVE HEART WITHOUT ANGINA PECTORIS: Primary | ICD-10-CM

## 2023-10-05 DIAGNOSIS — I25.5 ISCHEMIC CARDIOMYOPATHY: ICD-10-CM

## 2023-10-05 DIAGNOSIS — L98.9 SKIN LESION OF CHEST WALL: ICD-10-CM

## 2023-10-05 DIAGNOSIS — I21.3 ST ELEVATION MYOCARDIAL INFARCTION (STEMI), UNSPECIFIED ARTERY (HCC): Primary | ICD-10-CM

## 2023-10-05 PROCEDURE — 99214 OFFICE O/P EST MOD 30 MIN: CPT | Performed by: INTERNAL MEDICINE

## 2023-10-05 PROCEDURE — 93798 PHYS/QHP OP CAR RHAB W/ECG: CPT

## 2023-10-05 RX ORDER — LISINOPRIL 2.5 MG/1
2.5 TABLET ORAL DAILY
Qty: 30 TABLET | Refills: 3 | Status: SHIPPED | OUTPATIENT
Start: 2023-10-05

## 2023-10-05 NOTE — PROGRESS NOTES
Cardiac Rehabilitation Plan of Care   90 Day Reassessment          Today's date: 10/5/2023   # of Exercise Sessions Completed: 31  Patient name: José Davenport      : 1965  Age: 63 y.o. MRN: [id number]  Referring Physician: Perry Thompson MD  Cardiologist: Dr. Sam Hernandez (waiting to pick a provider)   Provider: Shari Camacho  Clinician: Jojo Phillips MS, CEP      Dx:   Encounter Diagnosis   Name Primary? • ST elevation myocardial infarction (STEMI), unspecified artery ([address]) Yes     Date of onset: 2023      SUMMARY OF PROGRESS:  Mr. Chaitanya Cee has started his cardiac rehabilitation program post STEMI and PCIx1 to proximal LAD. He was having radiating upper back pain and vomiting that led him into the hospital. He was also found to have ischemic cardiomyopathy with EF: 35%. He has recent Echo which shows improvement to 45%. He has completed 31 sessions over the past 90 days. He is doing well overall and is tolerating exercise sessions well. He is completing 45-50 min of aerobic exercise and is increasing his exercise intensities and times gradually as he tolerates. He is now reaching 6.4 METs and shows normal hemodynamic response to exercise. BP is 102//80 at rest. Telemetry shows NSR with rare PVCs. He has not started home exercise program, but he has been encouraged to start walking at home 20-30 min with goal of reaching 150-200 min/week of aerobic exercise. His goal for his rehab program is to improve his EF from 35%. He reports he is on target with personal goals at 90 days. He reports feeling very good with the exercise and can feel it is helping him. He is happy to see his EF has improved to 45%. Initial depression screening using the PHQ-9 interprets the patient's score of  3. GREG-7 screening tool for anxiety suggests 0. PHQ-9 will not need to be reassessed every 30 days due to no concern with depression or anxiety.  He reports excellent social/emotional support from family and friends. Information to utilize [employer] was provided as well as contact information for counseling through [facility]. His CAD risk factors include: inactivity, obesity/overweight, hypertension, hyperlipidemia and diabetes. He admits to 100% medication compliance. He is attending weekly group education classes on heart healthy eating, reading food labels, stress management, risk factor reduction, understanding heart disease and common heart medications. He is in agreement to continue cardiac rehab sessions attending 3x/week at this time with goal of reaching 36 sessions. Will continue to monitor and progress exercise over next 30 days of rehab and prepare for discharge.        Medication compliance: Yes   Comments: Pt reports to be compliant with medications               23: to stop taking Entresto per Anali Holm PA-C  Fall Risk: Low   Comments: Ambulates with a steady gait with no assist device    EKG Interpretation: NSR w/ occ PVCs, run of sinus tach/SVT, bigeminy PACs      EXERCISE ASSESSMENT and PLAN    Exercise Prescription:      Frequency: 3 days/week   Supplement with home exercise 2+ days/wk as tolerated       Minutes: 30-60         METS: 2.0-6.5            HR: RHR+ 30 beats above    RPE: 4-6         Modalities: Treadmill, UBE, Lifecycle, NuStep and Recumbent bike      30 Day Goals for Exercise Progression:    Frequency: 3 days/week of cardiac rehab       Supplement with home exercise 2+ days/wk as tolerated    Minutes: 50-60                             >150 mins/wk of moderate intensity exercise   METS: 6.1-6.8   HR: RHR+ 30 beats above     RPE: 3-4   Modalities: Treadmill, UBE, Lifecycle, Elliptical, Rower, NuStep and Recumbent bike    Strength trainin-3 days / week  12-15 repetitions  1-2 sets per modality   Will be added following 2-3 weeks of monitored exercise sessions   Modalities: Pull Downs, Lateral Raise, Arm Extension, Arm Curl, Upright Rows, Front Raises, Shoulder Shrugs and leg extension     Home Exercise: none-have encouraged walking 20-30 min on non-rehab days with goal of reaching 150-200 min/week of aerobic exercise    Goals: 10% improvement in functional capacity - based on max METs achieved in fitness assessment, Reduced dyspnea with physical activity  0-1/10, improved DASI score by 10%, Increase in exercise capacity by 40% - based on peak METs tolerated in cardiac rehab exercise session, Exercise 5 days/wk, >150mins/wk of moderate intensity exercise, Resume ADLs with increased strength, Return to work unrestricted, Attend Rehab regularly, Start a walking program and start a home exercise program    Progression Toward Goals:  Pt is progressing and showing improvement  toward the following goals:  increasing overall strength and endurance with regular exercise, improving functional capacity shown by increasing MET levels, attending CR sessions 3x/week regularly, no concerns at this time.         Education: benefit of exercise for CAD risk factors, home exercise guidelines, AHA guidelines to achieve >150 mins/wk of moderate exercise, RPE scale, class: Risk Factors for Heart Disease, exercise instructions/guidelines for discharge  and physical activity/exercise in extreme weather conditions   Plan:education on home exercise guidelines, home exercise 30+ mins 2 days opposite CR and Education class: Risk Factors for Heart Disease  Readiness to change: Preparation:  (Getting ready to change)       NUTRITION ASSESSMENT AND PLAN    Weight control:    Starting weight: 187 lbs    Current weight: 188.4 lbs    Diabetes: Pre-diabetes  A1c: 5.6     last measured: 06/19/2023    Lipid management: Discussed diet and lipid management and Last lipid profile 06/19/2023  Chol 151    HDL 33  LDL 85    Goals:reduced BMI to < 25, LDL <100, HDL >40, TRG <150, CHOL <200, decreased body fat% <25%   (M), reduced waist circumference <40 inches (M), fasting BG , Improved Rate Your Plate score  >58, 2.5-5%  wt loss, choose lean meat (93-95%), reduce portion sizes of meat to 3oz or less, increase intake of fish, shellfish, increase intake of meatless meals, use low fat dairy, reduce cheese intake or use reduced-fat, eat 3 or more servings of whole grains a day, Eat 4-5 cups of fruits and vegetables daily, use olive or canola oil in baking, eliminate butter, use fat-free dressings/monsivais or seldom use, choose healthy snacks: light popcorn, plain pretzels, seldom eat or choose low fat ice-cream, fruit juice bars or frozen yogurt , eliminate or choose low-fat sweets and daily saturated fat intake <7%/13g    Measurable goals were based Rate Your Plate Dietary Self-Assessment. These are the areas in which the patient could score higher on the assessment. Goals include recommendations for a heart healthy diet based on American Heart Association. Progression Toward Goals: Pt is progressing and showing improvement  toward the following goals:  reports doing well with diet and is trying to make changes to decrease saturated fat and salt intake. Weight is remaining stable      Education: heart healthy eating  low sodium diet  hydration  nutrition for  lipid management  nutrition for Improved BG control  target goal for A1c <7.0  exercise and diabetes management   wt. loss   portion control  education class: Heart Healthy Eating  education class:  Label Reading  Plan: Education class: Reading Food Labels, Education Class: Heart Healthy Eating, switch to low fat cheeses, replace butter with soft spreads made with olive oil, canola or yogurt, replace refined grain bread with whole grain bread, replace unhealthy snacks with fruits & vegs, reduce portion sizes, reduce red meat 1x/wk, switch to skim or 1% milk, eat fewer desserts and sweets, avoid processed foods, remove salt shaker from table, use salt substitute like Mrs.  Hardik, increase utilization of fresh or dried herbs, will try new grains like brown rice, [place], drink more water, learn how to read food labels and keep added daily sugar <25g/day  Readiness to change: Action:  (Changing behavior)      PSYCHOSOCIAL ASSESSMENT AND PLAN    Emotional:  Depression assessment:  PHQ-9 = 3  1-4 = Minimal Depression            Anxiety measure:  GREG-7 = 0  0-4  = Not anxious  Self-reported stress level:  2  Social support: Excellent    Goals:  Overall Health in [facility] Score < 3, improved sleep, Improved appetite and increased energy    Progression Toward Goals: Pt is progressing and showing improvement  toward the following goals:  no concerns with depression or anxiety at this time. Has great support from his wife and family. Education: signs/sxs of depression and benefits of a positive support system  Plan: Class: Stress and Your Health, Exercise, Spend time outdoors, Enjoy a hobby, Keep a positive mindset and Enjoy family  Readiness to change: Action:  (Changing behavior)      OTHER CORE COMPONENTS     Tobacco:   Social History     Tobacco Use   Smoking Status Never   • Passive exposure: Never   Smokeless Tobacco Never       Tobacco Use Intervention:   N/A:  Patient is a non-smoker     Anginal Symptoms:  nausea/vomiting and upper back  pain   NTG use: No prescription    Blood pressure:    Restin//80   Exercise: 116//84    Goals: consistent BP < 130/80, reduced dietary sodium <2300mg, moderate intensity exercise >150 mins/wk, medication compliance and reduce number of medications  needed for BP control    Progression Toward Goals: Pt is progressing and showing improvement  toward the following goals:  BP is stable within normal resting limits, reports feeling better since stopping entresto.         Education:  understanding high blood pressure and it's relationship to CAD, low sodium diet and HTN, Education class:  Common Heart Medications and Education class: Understanding Heart Disease  Plan: Class: Understanding Heart Disease, Class: Common Heart Medications, avoid places with second hand smoke, Avoid Processed foods, engage in regular exercise, eliminate salt shaker at the table, use salt substitutes, check labels for sodium content and monitor home BP  Readiness to change: action

## 2023-10-05 NOTE — PROGRESS NOTES
Cardiology Outpatient Follow-Up Note - Mir Patel 63 y.o. male MRN: [id number]    Assessment/Plan:  1. Skin lesion of chest wall  - Ambulatory referral to Cardiology    2. Subcutaneous mass of back  - Ambulatory referral to Cardiology    3. Coronary artery disease involving native coronary artery of native heart without angina pectoris  Without angina. Continue aspirin 81 mg daily and Brilinta 90 mg BID given STEMI in June 2023 -- DAPT until June 2024 then aspirin  81 mg daily monotherapy indefinitely thereafter. Continue high intensity statin -- atorvastatin 80 mg daily. Check lipid panel. Goal LDL <55 mg/dL  given young age and significant CAD presentation.   - Lipid Panel with Direct LDL reflex; Future    4. Ischemic cardiomyopathy  Despite reduced LVEF 45% he appears euvolemic with NYHA I-II symptoms. Continue metoprolol XL 25 mg daily. Add lisinopril 2.5 mg daily -- did not tolerate Entresto due to lightheadedness. Not much BP room for vasodilating medications. - Lipid Panel with Direct LDL reflex; Future  - lisinopril (ZESTRIL) 2.5 mg tablet; Take 1 tablet (2.5 mg total) by mouth daily  Dispense: 30 tablet; Refill: 3  - Basic metabolic panel; Future    5. Preop cardiovascular exam  He is not cleared for elective surgery given proximity to recent STEMI. DAPT should not be interrupted for elective surgery within 6 months, ideally within 12 months, of ACS and PABLO placement. He has a PABLO to pLAD 6/2023-- significant morbidity and mortality associated with potential stent thrombosis. I recommend delaying elective plastic surgery until June 2024 when ticagrelor can be discontinued. If there is urgency to surgery I recommend delaying until at least January 2024 at which time ticagrelor can be interrupted then restarted after surgery. We will see Mir Patel back in 6 months for routine follow-up.     Subjective:     HPI: Mir Patel is a 63y.o. year old male with CAD (STEMI June 2023 s/p PABLO to pLAD), ICMP with LVEF 45%, CKD, HLD, pre-DM presenting for follow-up. He also presents today for cardiac clearance for chest mass excision and left back lipoma excision. No chest pain. No dyspnea with exertion. He was just siding his house without any cardiac symptoms. No orthopnea or PND. No LE edema. He has been doing cardiac rehab without any issues. Per rehab note 10/5/23:    He is completing 45-50 min of aerobic exercise and is increasing his exercise intensities and times gradually as he tolerates. He is now reaching 6.4 METs and shows normal hemodynamic response to exercise. BP is 102//80 at rest.    Cardiac Testing:    Echo 10/3/23   Interpretation Summary         Left Ventricle: Left ventricular cavity size is normal. Wall thickness is normal. The left ventricular ejection fraction is 45% by biplane measurement. . Systolic function is mildly reduced. Global longitudinal strain is reduced at -11% with marked regional reduction in the anteroseptum, anterior wall and apex. Diastolic function is mildly abnormal, consistent with grade I (abnormal) relaxation. Left atrial filling pressure is normal.    The following segments are akinetic: mid anteroseptal, apical anterior, apical septal, apical lateral and apex. All other segments are normal.    Right Ventricle: Right ventricular cavity size is normal. Systolic function is normal.    Tricuspid Valve: There is mild regurgitation. The tricuspid valve regurgitation jet is eccentric. Strain was performed to quantify interventricular dyssynchrony and evaluate components of myocardial function due to CAD, MI. ICM . Results from the utilization of Strain Analysis are listed in the report below. Cardiac Cath 06/18/2023   Impression         Prox LAD lesion is 100% stenosed. Single-vessel coronary disease with a total occlusion of the proximal LAD representing the culprit for the patient's anterior STEMI.   Successful IVUS-guided PCI, proximal LAD, with a reduction in stenosis from 100% to 0% following pre-dilation and placement of a 4.0x28mm drug-eluting stent. Mildly elevated LVEDP (20 mmHg). Plan: DAPT, statin, beta-blocker, echocardiogram, guideline-directed medical therapy if LVEDP is significantly reduced, cardiac rehabilitation. EKGs, personally reviewed:  N/a    Relevant Labs & Results:  Lipid panel 06/19/23 -- , , HDL 33, LDL 85 mg/dL       ROS:  Review of Systems:  Review of Systems    Objective:     Vitals:   Vitals:    10/05/23 1420   BP: 108/68   BP Location: Left arm   Patient Position: Sitting   Cuff Size: Standard   Pulse: 60   Weight: 85.9 kg (189 lb 6.4 oz)   Height: 5' 6" (1.676 m)    Body surface area is 1.95 meters squared. Wt Readings from Last 3 Encounters:   10/05/23 85.9 kg (189 lb 6.4 oz)   10/03/23 85.7 kg (189 lb)   10/03/23 84 kg (185 lb 3.2 oz)       Physical Exam:  General: Altaf Stahl is a well appearing male, in no acute distress, sitting comfortably  HEENT: moist mucous membranes, EOMI  Neck:  No JVD, supple, trachea midline  Cardiovascular: unremarkable S1/S2, regular rate and rhythm, no murmurs, rubs or gallops  Pulmonary: normal respiratory effort, CTAB  Abdomen: soft and nondistended  Extremities: No lower extremity edema. Warm and well perfused extremities. Neuro: no focal motor deficits, AAOx3 (person, place, time)  Psych: Normal mood and affect, cooperative      Medications (at the START of this encounter):   Outpatient Medications Prior to Visit   Medication Sig Dispense Refill    aspirin (ECOTRIN LOW STRENGTH) 81 mg EC tablet Take 1 tablet (81 mg total) by mouth daily 90 tablet 3    atorvastatin (LIPITOR) 80 mg tablet Take 1 tablet (80 mg total) by mouth daily at bedtime 90 tablet 3    metoprolol succinate (TOPROL-XL) 25 mg 24 hr tablet Take 0.5 tablets (12.5 mg total) by mouth daily 45 tablet 3    nitroglycerin (NITROSTAT) 0.4 mg SL tablet Place 1 tablet (0.4 mg total) under the tongue every 5 (five) minutes as needed for chest pain 30 tablet 0    penicillin V potassium (VEETID) 500 mg tablet Take 500 mg by mouth 4 (four) times a day      ticagrelor (BRILINTA) 90 MG Take 1 tablet (90 mg total) by mouth every 12 (twelve) hours 180 tablet 3     No facility-administered medications prior to visit. Time Spent:  Total time (face-to-face and non-face-to-face) spent on today's visit was 30 minutes. This includes preparation for the visits (i.e. reviewing test results), performance of a medically appropriate history and examination, and orders for medications, tests or other procedures. This time is exclusive of procedures performed and time spent teaching. This note was completed in part utilizing [employer] voice recognition software. Grammatical errors, random word insertion, spelling mistakes, occasional wrong word or "sound-alike" substitutions and incomplete sentences may be an occasional consequence of the system secondary to software limitations, ambient noise and hardware issues. At the time of dictation, efforts were made to edit, clarify and /or correct errors. Please read the chart carefully and recognize, using context, where substitutions have occurred. If you have any questions or concerns about the context, text or information contained within the body of this dictation, please contact myself, the provider, for further clarification.

## 2023-10-06 ENCOUNTER — CLINICAL SUPPORT (OUTPATIENT)
Dept: CARDIAC REHAB | Facility: HOSPITAL | Age: 58
End: 2023-10-06
Attending: INTERNAL MEDICINE
Payer: COMMERCIAL

## 2023-10-06 DIAGNOSIS — I21.3 ST ELEVATION MYOCARDIAL INFARCTION (STEMI), UNSPECIFIED ARTERY (HCC): Primary | ICD-10-CM

## 2023-10-06 PROCEDURE — 93798 PHYS/QHP OP CAR RHAB W/ECG: CPT

## 2023-10-10 ENCOUNTER — CLINICAL SUPPORT (OUTPATIENT)
Dept: CARDIAC REHAB | Facility: HOSPITAL | Age: 58
End: 2023-10-10
Attending: INTERNAL MEDICINE
Payer: COMMERCIAL

## 2023-10-10 DIAGNOSIS — I21.3 ST ELEVATION MYOCARDIAL INFARCTION (STEMI), UNSPECIFIED ARTERY (HCC): Primary | ICD-10-CM

## 2023-10-10 PROCEDURE — 93798 PHYS/QHP OP CAR RHAB W/ECG: CPT

## 2023-10-12 ENCOUNTER — CLINICAL SUPPORT (OUTPATIENT)
Dept: CARDIAC REHAB | Facility: HOSPITAL | Age: 58
End: 2023-10-12
Attending: INTERNAL MEDICINE
Payer: COMMERCIAL

## 2023-10-12 DIAGNOSIS — I21.3 ST ELEVATION MYOCARDIAL INFARCTION (STEMI), UNSPECIFIED ARTERY (HCC): Primary | ICD-10-CM

## 2023-10-12 PROCEDURE — 93798 PHYS/QHP OP CAR RHAB W/ECG: CPT

## 2023-10-13 ENCOUNTER — CLINICAL SUPPORT (OUTPATIENT)
Dept: CARDIAC REHAB | Facility: HOSPITAL | Age: 58
End: 2023-10-13
Attending: INTERNAL MEDICINE
Payer: COMMERCIAL

## 2023-10-13 DIAGNOSIS — I21.3 ST ELEVATION MYOCARDIAL INFARCTION (STEMI), UNSPECIFIED ARTERY (HCC): Primary | ICD-10-CM

## 2023-10-13 PROCEDURE — 93798 PHYS/QHP OP CAR RHAB W/ECG: CPT

## 2023-10-13 NOTE — PROGRESS NOTES
Cardiac Rehabilitation Plan of Care   Discharge          Today's date: 10/13/2023   # of Exercise Sessions Completed: 35  Patient name: Annie Rivera      : 1965  Age: 63 y.o. MRN: [id number]  Referring Physician: Jersey Armstrong MD  Cardiologist: Dr. Carolyne Alanis (waiting to pick a provider)   Provider: Ani cervantes  Clinician: Krista Bruner MS, CEP      Dx:   Encounter Diagnosis   Name Primary? ST elevation myocardial infarction (STEMI), unspecified artery ([address]) Yes     Date of onset: 2023      SUMMARY OF PROGRESS:  Mr. Nelly Hancock has completed his cardiac rehabilitation program post STEMI and PCIx1 to proximal LAD. He was having radiating upper back pain and vomiting that led him into the hospital. He was also found to have ischemic cardiomyopathy with EF: 35%. He has recent Echo which shows improvement to 45%. He has completed 35 sessions over the past 120 days. He has done well overall and is tolerating exercise sessions well. He is completing 45-50 min of aerobic exercise and is increasing his exercise intensities and times gradually as he tolerates. He is now reaching 6.5 METs and shows normal hemodynamic response to exercise. BP is 102//80 at rest. Telemetry shows NSR with rare PVCs. He has not started home exercise program, but he has been encouraged to start walking at home 20-30 min with goal of reaching 150-200 min/week of aerobic exercise. He completed post TM ETT today and reached 9.6 METs at 16 min (123% change from initial). His goal for his rehab program was to improve his EF from 35%. He has met his personal goals at completion of his program. He reports feeling very good with the exercise and can feel it is helping him. He is happy to see his EF has improved to 45%. Initial depression screening using the PHQ-9 interprets the patient's score of  3. GREG-7 screening tool for anxiety suggests 0. PHQ-9 at completion is ) and GREG-9=0. He reports excellent social/emotional support from family and friends. Information to utilize [employer] was provided as well as contact information for counseling through [facility]. His CAD risk factors include: inactivity, obesity/overweight, hypertension, hyperlipidemia and diabetes. He admits to 100% medication compliance. He is attended weekly group education classes on heart healthy eating, reading food labels, stress management, risk factor reduction, understanding heart disease and common heart medications. He plans to continue regular exercise at home walking daily with goal of  min/week of aerobic exercise.       Medication compliance: Yes   Comments: Pt reports to be compliant with medications               23: to stop taking Entresto per Sofie Nguyễn PA-C  Fall Risk: Low   Comments: Ambulates with a steady gait with no assist device    EKG Interpretation: NSR w/ occ PVCs, run of sinus tach/SVT, bigeminy PACs      EXERCISE ASSESSMENT and PLAN    Exercise Prescription:      Frequency: 3 days/week   Supplement with home exercise 2+ days/wk as tolerated       Minutes: 30-60         METS: 2.0-6.5            HR: RHR+ 30 beats above    RPE: 4-6         Modalities: Treadmill, UBE, Lifecycle, NuStep and Recumbent bike      30 Day Goals for Exercise Progression:    Frequency: 3 days/week of cardiac rehab       Supplement with home exercise 2+ days/wk as tolerated    Minutes: 50-60                             >150 mins/wk of moderate intensity exercise   METS: 6.1-6.8   HR: RHR+ 30 beats above     RPE: 3-4   Modalities: Treadmill, UBE, Lifecycle, Elliptical, Rower, NuStep and Recumbent bike    Strength trainin-3 days / week  12-15 repetitions  1-2 sets per modality   Will be added following 2-3 weeks of monitored exercise sessions   Modalities: Pull Downs, Lateral Raise, Arm Extension, Arm Curl, Upright Rows, Front Raises, Shoulder Shrugs and leg extension     Home Exercise: none-have encouraged walking 20-30 min on non-rehab days with goal of reaching 150-200 min/week of aerobic exercise    Goals: 10% improvement in functional capacity - based on max METs achieved in fitness assessment, Reduced dyspnea with physical activity  0-1/10, improved DASI score by 10%, Increase in exercise capacity by 40% - based on peak METs tolerated in cardiac rehab exercise session, Exercise 5 days/wk, >150mins/wk of moderate intensity exercise, Resume ADLs with increased strength, Return to work unrestricted, Attend Rehab regularly, Start a walking program and start a home exercise program    Progression Toward Goals:  Goals met: EF improved to 45%.  Shows increase in functional capacity with increased MET levels, attended 35 sessions of CR regularly attending 3x/week, has plan to continue walking at home daily with goal of 150-200 min/week      Education: benefit of exercise for CAD risk factors, home exercise guidelines, AHA guidelines to achieve >150 mins/wk of moderate exercise, RPE scale, class: Risk Factors for Heart Disease, exercise instructions/guidelines for discharge  and physical activity/exercise in extreme weather conditions   Plan:education on home exercise guidelines, home exercise 30+ mins 2 days opposite CR and Education class: Risk Factors for Heart Disease  Readiness to change: action      NUTRITION ASSESSMENT AND PLAN    Weight control:    Starting weight: 187 lbs    Current weight: 183.3 lbs    Diabetes: Pre-diabetes  A1c: 5.6     last measured: 06/19/2023    Lipid management: Discussed diet and lipid management and Last lipid profile 06/19/2023  Chol 151    HDL 33  LDL 85    Goals:reduced BMI to < 25, LDL <100, HDL >40, TRG <150, CHOL <200, decreased body fat% <25%   (M), reduced waist circumference <40 inches (M), fasting BG , Improved Rate Your Plate score  >77, 5.8-7%  wt loss, choose lean meat (93-95%), reduce portion sizes of meat to 3oz or less, increase intake of fish, shellfish, increase intake of meatless meals, use low fat dairy, reduce cheese intake or use reduced-fat, eat 3 or more servings of whole grains a day, Eat 4-5 cups of fruits and vegetables daily, use olive or canola oil in baking, eliminate butter, use fat-free dressings/monsivais or seldom use, choose healthy snacks: light popcorn, plain pretzels, seldom eat or choose low fat ice-cream, fruit juice bars or frozen yogurt , eliminate or choose low-fat sweets and daily saturated fat intake <7%/13g    Measurable goals were based Rate Your Plate Dietary Self-Assessment. These are the areas in which the patient could score higher on the assessment. Goals include recommendations for a heart healthy diet based on American Heart Association. Progression Toward Goals: Goals met: reports doing well with diet and is trying to make changes to decrease saturated fat and salt intake. Weight is remaining stable. Has lost 4 lbs      Education: heart healthy eating  low sodium diet  hydration  nutrition for  lipid management  nutrition for Improved BG control  target goal for A1c <7.0  exercise and diabetes management   wt. loss   portion control  education class: Heart Healthy Eating  education class:  Label Reading  Plan: Education class: Reading Food Labels, Education Class: Heart Healthy Eating, switch to low fat cheeses, replace butter with soft spreads made with olive oil, canola or yogurt, replace refined grain bread with whole grain bread, replace unhealthy snacks with fruits & vegs, reduce portion sizes, reduce red meat 1x/wk, switch to skim or 1% milk, eat fewer desserts and sweets, avoid processed foods, remove salt shaker from table, use salt substitute like Mrs. Dye, increase utilization of fresh or dried herbs, will try new grains like brown rice, quinoa, farro, drink more water, learn how to read food labels and keep added daily sugar <25g/day  Readiness to change: Action:  (Changing behavior)      PSYCHOSOCIAL ASSESSMENT AND PLAN    Emotional: Depression assessment:  PHQ-9 = 3  1-4 = Minimal Depression            Anxiety measure:  GREG-7 = 0  0-4  = Not anxious  Self-reported stress level:  2  Social support: Excellent    Goals:  Overall Health in [facility] Score < 3, improved sleep, Improved appetite and increased energy    Progression Toward Goals: Goals met: no concerns with depression or anxiety at this time. Has excellent support from his wife and family. Education: signs/sxs of depression and benefits of a positive support system  Plan: Class: Stress and Your Health, Exercise, Spend time outdoors, Enjoy a hobby, Keep a positive mindset and Enjoy family  Readiness to change: Action:  (Changing behavior)      OTHER CORE COMPONENTS     Tobacco:   Social History     Tobacco Use   Smoking Status Never    Passive exposure: Never   Smokeless Tobacco Never       Tobacco Use Intervention:   N/A:  Patient is a non-smoker     Anginal Symptoms:  nausea/vomiting and upper back  pain   NTG use: No prescription    Blood pressure:    Restin//80   Exercise: 116//84    Goals: consistent BP < 130/80, reduced dietary sodium <2300mg, moderate intensity exercise >150 mins/wk, medication compliance and reduce number of medications  needed for BP control    Progression Toward Goals: Pt is progressing and showing improvement  toward the following goals:  BP is stable within normal resting limits, reports feeling better since stopping entresto.         Education:  understanding high blood pressure and it's relationship to CAD, low sodium diet and HTN, Education class:  Common Heart Medications and Education class: Understanding Heart Disease  Plan: Class: Understanding Heart Disease, Class: Common Heart Medications, avoid places with second hand smoke, Avoid Processed foods, engage in regular exercise, eliminate salt shaker at the table, use salt substitutes, check labels for sodium content and monitor home BP  Readiness to change: action

## 2023-10-17 ENCOUNTER — APPOINTMENT (OUTPATIENT)
Dept: CARDIAC REHAB | Facility: HOSPITAL | Age: 58
End: 2023-10-17
Attending: INTERNAL MEDICINE
Payer: COMMERCIAL

## 2023-12-12 ENCOUNTER — APPOINTMENT (OUTPATIENT)
Dept: LAB | Facility: AMBULARY SURGERY CENTER | Age: 58
End: 2023-12-12
Payer: COMMERCIAL

## 2023-12-12 ENCOUNTER — OFFICE VISIT (OUTPATIENT)
Dept: INTERNAL MEDICINE CLINIC | Facility: CLINIC | Age: 58
End: 2023-12-12
Payer: COMMERCIAL

## 2023-12-12 VITALS
SYSTOLIC BLOOD PRESSURE: 140 MMHG | DIASTOLIC BLOOD PRESSURE: 90 MMHG | TEMPERATURE: 97.9 F | HEIGHT: 66 IN | WEIGHT: 185 LBS | BODY MASS INDEX: 29.73 KG/M2 | HEART RATE: 52 BPM | RESPIRATION RATE: 16 BRPM | OXYGEN SATURATION: 98 %

## 2023-12-12 DIAGNOSIS — I25.5 ISCHEMIC CARDIOMYOPATHY: Primary | ICD-10-CM

## 2023-12-12 DIAGNOSIS — I25.10 CORONARY ARTERY DISEASE INVOLVING NATIVE CORONARY ARTERY OF NATIVE HEART WITHOUT ANGINA PECTORIS: ICD-10-CM

## 2023-12-12 DIAGNOSIS — I25.5 ISCHEMIC CARDIOMYOPATHY: ICD-10-CM

## 2023-12-12 DIAGNOSIS — K64.1 GRADE II HEMORRHOIDS: ICD-10-CM

## 2023-12-12 LAB
ANION GAP SERPL CALCULATED.3IONS-SCNC: 5 MMOL/L
BUN SERPL-MCNC: 20 MG/DL (ref 5–25)
CALCIUM SERPL-MCNC: 9 MG/DL (ref 8.4–10.2)
CHLORIDE SERPL-SCNC: 105 MMOL/L (ref 96–108)
CHOLEST SERPL-MCNC: 182 MG/DL
CO2 SERPL-SCNC: 29 MMOL/L (ref 21–32)
CREAT SERPL-MCNC: 1.25 MG/DL (ref 0.6–1.3)
GFR SERPL CREATININE-BSD FRML MDRD: 63 ML/MIN/1.73SQ M
GLUCOSE P FAST SERPL-MCNC: 95 MG/DL (ref 65–99)
HDLC SERPL-MCNC: 35 MG/DL
LDLC SERPL CALC-MCNC: 101 MG/DL (ref 0–100)
POTASSIUM SERPL-SCNC: 4.6 MMOL/L (ref 3.5–5.3)
SODIUM SERPL-SCNC: 139 MMOL/L (ref 135–147)
TRIGL SERPL-MCNC: 232 MG/DL

## 2023-12-12 PROCEDURE — 99214 OFFICE O/P EST MOD 30 MIN: CPT

## 2023-12-12 PROCEDURE — 80048 BASIC METABOLIC PNL TOTAL CA: CPT

## 2023-12-12 PROCEDURE — 80061 LIPID PANEL: CPT

## 2023-12-12 PROCEDURE — 36415 COLL VENOUS BLD VENIPUNCTURE: CPT

## 2023-12-12 RX ORDER — LOSARTAN POTASSIUM 25 MG/1
25 TABLET ORAL DAILY
Qty: 30 TABLET | Refills: 0 | Status: SHIPPED | OUTPATIENT
Start: 2023-12-12 | End: 2024-01-11

## 2023-12-12 RX ORDER — LOSARTAN POTASSIUM 25 MG/1
25 TABLET ORAL DAILY
Qty: 30 TABLET | Refills: 0 | Status: SHIPPED | OUTPATIENT
Start: 2023-12-12 | End: 2023-12-12 | Stop reason: SDUPTHER

## 2023-12-12 NOTE — PROGRESS NOTES
INTERNAL MEDICINE FOLLOW-UP OFFICE VISIT  [facility] - [facility] INTERNAL MEDICINE NURYS    NAME: Sonia Hess  AGE: 63 y.o. SEX: male  : 1965     DATE: 2023     Assessment and Plan:     1. Ischemic cardiomyopathy  Assessment & Plan:  History of ischemic cardiopathy in the setting of STEMI in 2023 with LVEF of 45% on echo from 10/3. Global longitudinal strain is reduced at -11% with marked regional reduction in the anteroseptum, anterior wall and apex. Diastolic function is mildly abnormal, consistent with grade I (abnormal) relaxation. Switched from Radonna Stamp to lisinopril during recent cardiology visit due to intolerance of Entresto. Patient is now endorsing side effect of mood swings while taking lisinopril. Continue metoprolol succinate 12.5 mg daily  Continue cardiac rehabilitation  Follows with outpatient cardiologist Dr. Tangela Negrete:  Given side effect of lisinopril, will switch to losartan half a tab (12.5 mg) daily and monitor for side effects. I informed the patient to resume taking metoprolol succinate, which he is agreeable. Patient will obtain a blood pressure cuff and check his blood pressures more routinely at home. Orders:  -     losartan (COZAAR) 25 mg tablet; Take 1 tablet (25 mg total) by mouth daily Cut the tablet in half to take 12.5 mg daily instead of the full 25 mg.    2. Coronary artery disease involving native coronary artery of native heart without angina pectoris  Assessment & Plan:  Remains compliant on medications including aspirin, Brilinta, high-intensity atorvastatin, and metoprolol succinate. He continues to attend cardiac rehabilitation. Patient to continue DAPT for one year (2024) as recommended by cardiology.   Lipid panel on  showed normal total cholesterol, triglyceride elevated at 166, HDL low at 33, and normal LDL  Ordered for repeat lipid panel  Hgb A1c on  is borderline pre-diabetic (5.6)  Can re-check A1c in a year  Discussion was held with patient regarding regularly weekly exercise and dietary modification. He has been limiting his carbohydrate and sodium intake and eating more fruits, vegetables, and salmon. Orders:  -     losartan (COZAAR) 25 mg tablet; Take 1 tablet (25 mg total) by mouth daily Cut the tablet in half to take 12.5 mg daily instead of the full 25 mg.    3. Grade II hemorrhoids  Assessment & Plan:  Continues to endorse small quantity hemorrhoidal bleeding while on aspirin. Last colonoscopy was approximately 5-6 years ago. Patient is in agreement to continue aspirin and Brilinta after risks versus benefits discussion. Patient has not noticed increased bleeding while on aspirin and Brilinta. Informed patient to call our office if there is development of larger quantity bleeding and symptoms attributed to acute blood loss anemia. Patient wishes to hold off on colorectal surgery referral at this time as quantity of bleeding is mild. No follow-ups on file. Chief Complaint:     Chief Complaint   Patient presents with    Follow-up     Discuss meds        History of Present Illness:     Adan Christian is a 63year old male with PMH of CAD, MI s/p LAD stent in June 2023, dyslipidemia, CKD stage 3A, sciatica, prediabetes, and HTN who presents to the office for follow-up. He presents today with concern regarding side effect from lisinopril. He endorses mood swings and describes it as an internal feeling of inadequacy and feeling as though he is not accomplishing enough during the day. He has no issues with concentration and denies any feelings of depression. The mood swings were noticed by his spouse and were not present prior to starting lisinopril. He otherwise feels well and denies any chest pain, palpitations, shortness of breath, nausea, vomiting, coughing, and abdominal pain.     The following portions of the patient's history were reviewed and updated as appropriate: allergies, current medications, past family history, past medical history, past social history, past surgical history and problem list.     Review of Systems:     Review of Systems   Constitutional:  Negative for chills and fever. HENT:  Negative for congestion and rhinorrhea. Eyes:  Negative for visual disturbance. Respiratory:  Negative for cough and shortness of breath. Cardiovascular:  Negative for chest pain and palpitations. Gastrointestinal:  Negative for abdominal pain, nausea and vomiting. Genitourinary:  Negative for dysuria and hematuria. Musculoskeletal:  Negative for arthralgias and back pain. Skin:  Negative for color change and rash. Neurological:  Negative for weakness and light-headedness. Psychiatric/Behavioral:  Positive for dysphoric mood. Negative for agitation, behavioral problems and decreased concentration.          Problem List:     Patient Active Problem List   Diagnosis    Hyperglyceridemia, pure    Impaired fasting glucose    Chronic right-sided low back pain without sciatica    Right flank pain    Stage 3a chronic kidney disease (HCC)    Elevated blood-pressure reading without diagnosis of hypertension    Bilateral renal cysts    Grade II hemorrhoids    History of colonic polyps    History of urinary stone    Kidney stone    Elevated bilirubin    Mixed hyperlipidemia    Chronic kidney disease-mineral and bone disorder    Back strain    Sciatic pain, right    Acute ST elevation myocardial infarction (STEMI) due to occlusion of left anterior descending (LAD) coronary artery (HCC)    Coronary artery disease involving native coronary artery of native heart without angina pectoris    Ischemic cardiomyopathy    Skin lesion of chest wall        Objective:     /90 (BP Location: Left arm, Patient Position: Sitting, Cuff Size: Adult)   Pulse (!) 52   Temp 97.9 °F (36.6 °C) (Tympanic)   Resp 16   Ht 5' 6" (1.676 m)   Wt 83.9 kg (185 lb)   SpO2 98%   BMI 29.86 kg/m² Physical Exam  Vitals and nursing note reviewed. Constitutional:       General: He is not in acute distress. Appearance: He is well-developed. HENT:      Head: Normocephalic and atraumatic. Right Ear: External ear normal.      Left Ear: External ear normal.      Nose: Nose normal.   Eyes:      Conjunctiva/sclera: Conjunctivae normal.   Cardiovascular:      Rate and Rhythm: Normal rate and regular rhythm. Pulses: Normal pulses. Heart sounds: Normal heart sounds. No murmur heard. Pulmonary:      Effort: Pulmonary effort is normal. No respiratory distress. Breath sounds: Normal breath sounds. No wheezing, rhonchi or rales. Abdominal:      Palpations: Abdomen is soft. Tenderness: There is no abdominal tenderness. Musculoskeletal:         General: No swelling. Cervical back: Neck supple. Right lower leg: No edema. Left lower leg: No edema. Skin:     General: Skin is warm and dry. Capillary Refill: Capillary refill takes less than 2 seconds. Neurological:      General: No focal deficit present. Mental Status: He is alert and oriented to person, place, and time.    Psychiatric:         Mood and Affect: Mood normal.         Behavior: Behavior normal.         Pertinent Laboratory/Diagnostic Studies:    Laboratory Results: I have personally reviewed the pertinent laboratory results/reports     Chemistry Profile:   Results from Last 12 Months   Lab Units 09/29/23  0859 06/20/23  0420 06/19/23  0245 06/18/23  1422 02/28/23  0840   POTASSIUM mmol/L 4.9 3.9   < > 4.5 5.2   CHLORIDE mmol/L 103 105   < > 105 103   CO2 mmol/L 28 24   < > 29 31   BUN mg/dL 20 18   < > 26* 23   CREATININE mg/dL 1.34* 1.18   < > 1.76* 1.35*   GLUCOSE FASTING mg/dL  --   --   --   --  116*   GLUCOSE RANDOM mg/dL 106 139   < > 148*  --    CALCIUM mg/dL 9.5 8.2*   < > 9.3 9.3   MAGNESIUM mg/dL  --  2.0   < >  --  2.1   PHOSPHORUS mg/dL  --  1.6*   < >  --  3.1   AST U/L  --   --   -- 35  --    ALT U/L  --   --   --  46  --    ALK PHOS U/L  --   --   --  91  --    EGFR ml/min/1.73sq m 58 68   < > 42 58    < > = values in this interval not displayed. Radiology/Other Diagnostic Testing Results: I have personally reviewed pertinent reports.       [address] [place] [address], [facility] INTERNAL MEDICINE Dane Villavicencio

## 2023-12-12 NOTE — ASSESSMENT & PLAN NOTE
Continues to endorse small quantity hemorrhoidal bleeding while on aspirin. Last colonoscopy was approximately 5-6 years ago. Patient is in agreement to continue aspirin and Brilinta after risks versus benefits discussion. Patient has not noticed increased bleeding while on aspirin and Brilinta. Informed patient to call our office if there is development of larger quantity bleeding and symptoms attributed to acute blood loss anemia. Patient wishes to hold off on colorectal surgery referral at this time as quantity of bleeding is mild.

## 2023-12-12 NOTE — ASSESSMENT & PLAN NOTE
Remains compliant on medications including aspirin, Brilinta, high-intensity atorvastatin, and metoprolol succinate. He continues to attend cardiac rehabilitation. Patient to continue DAPT for one year (June 2024) as recommended by cardiology. Lipid panel on 6/19 showed normal total cholesterol, triglyceride elevated at 166, HDL low at 33, and normal LDL  Ordered for repeat lipid panel  Hgb A1c on 6/19 is borderline pre-diabetic (5.6)  Can re-check A1c in a year  Discussion was held with patient regarding regularly weekly exercise and dietary modification. He has been limiting his carbohydrate and sodium intake and eating more fruits, vegetables, and salmon.

## 2023-12-12 NOTE — ASSESSMENT & PLAN NOTE
History of ischemic cardiopathy in the setting of STEMI in June 2023 with LVEF of 45% on echo from 10/3. Global longitudinal strain is reduced at -11% with marked regional reduction in the anteroseptum, anterior wall and apex. Diastolic function is mildly abnormal, consistent with grade I (abnormal) relaxation. Switched from HUNDSHAGEN to lisinopril during recent cardiology visit due to intolerance of Entresto. Patient is now endorsing side effect of mood swings while taking lisinopril. Continue metoprolol succinate 12.5 mg daily  Continue cardiac rehabilitation  Follows with outpatient cardiologist Dr. Sav Parker:  Given side effect of lisinopril, will switch to losartan half a tab (12.5 mg) daily and monitor for side effects. I informed the patient to resume taking metoprolol succinate, which he is agreeable. Patient will obtain a blood pressure cuff and check his blood pressures more routinely at home.

## 2023-12-14 ENCOUNTER — TELEPHONE (OUTPATIENT)
Dept: CARDIOLOGY CLINIC | Facility: CLINIC | Age: 58
End: 2023-12-14

## 2023-12-14 DIAGNOSIS — E78.2 MIXED HYPERLIPIDEMIA: Primary | ICD-10-CM

## 2023-12-14 NOTE — TELEPHONE ENCOUNTER
Spoke with pt. Pt stopped taking the Atorvastatin and Lisinopril for 2 weeks to rule out SE of medications. Pt was having issues with irritability and mood swings. His PCP changed Lisinopril to Losartan now and pt has not had any other SE. Pt Started taking his Atorvastatin again the other day.

## 2023-12-14 NOTE — TELEPHONE ENCOUNTER
----- Message from Michael Benjamin MD sent at 12/13/2023  8:46 AM EST -----  Please call the patient regarding his abnormal result  His cholesterol went up. Is he taking his atorvastatin?

## 2024-01-03 DIAGNOSIS — I21.3 ST ELEVATION MYOCARDIAL INFARCTION (STEMI), UNSPECIFIED ARTERY (HCC): ICD-10-CM

## 2024-01-03 RX ORDER — METOPROLOL SUCCINATE 25 MG/1
12.5 TABLET, EXTENDED RELEASE ORAL DAILY
Qty: 45 TABLET | Refills: 3 | Status: SHIPPED | OUTPATIENT
Start: 2024-01-03

## 2024-01-03 NOTE — TELEPHONE ENCOUNTER
Adan Melendez. I'm calling because [facility] is sending out me my metoprolol, but it's gonna take a week to get. I realized I only have two tablets left so I need to fill some, probably at the [facility]. If I could get a script for that would be great. Doctor Rosita or Doctor Mchugh, [id number]  25 milligram, half a tablet. I'll need at least a week, maybe two. They'll be resending. By the time I get the refill, it'll be too late and I'll probably have A at least a week before I get it there. [facility] call me back [phone] if you have any questions.

## 2024-01-04 DIAGNOSIS — I25.10 CORONARY ARTERY DISEASE INVOLVING NATIVE CORONARY ARTERY OF NATIVE HEART WITHOUT ANGINA PECTORIS: ICD-10-CM

## 2024-01-04 DIAGNOSIS — I25.5 ISCHEMIC CARDIOMYOPATHY: ICD-10-CM

## 2024-01-04 NOTE — TELEPHONE ENCOUNTER
Per Internal Medicine OV on 12/12, Lisinopril was d/c'ed and Losartan ordered as 25mg, a half-tablet daily.     Dr. Joyce, OK to refill?

## 2024-01-05 RX ORDER — LOSARTAN POTASSIUM 25 MG/1
12.5 TABLET ORAL DAILY
Qty: 90 TABLET | Refills: 0 | Status: SHIPPED | OUTPATIENT
Start: 2024-01-05

## 2024-02-01 ENCOUNTER — OFFICE VISIT (OUTPATIENT)
Dept: NEPHROLOGY | Facility: CLINIC | Age: 59
End: 2024-02-01
Payer: COMMERCIAL

## 2024-02-01 VITALS
HEART RATE: 58 BPM | SYSTOLIC BLOOD PRESSURE: 124 MMHG | BODY MASS INDEX: 30.86 KG/M2 | DIASTOLIC BLOOD PRESSURE: 78 MMHG | HEIGHT: 66 IN | OXYGEN SATURATION: 98 % | WEIGHT: 192 LBS

## 2024-02-01 DIAGNOSIS — E83.9 CHRONIC KIDNEY DISEASE-MINERAL AND BONE DISORDER: ICD-10-CM

## 2024-02-01 DIAGNOSIS — N18.31 STAGE 3A CHRONIC KIDNEY DISEASE (HCC): Primary | ICD-10-CM

## 2024-02-01 DIAGNOSIS — N18.9 CHRONIC KIDNEY DISEASE-MINERAL AND BONE DISORDER: ICD-10-CM

## 2024-02-01 DIAGNOSIS — N18.30 BENIGN HYPERTENSION WITH CKD (CHRONIC KIDNEY DISEASE) STAGE III (HCC): ICD-10-CM

## 2024-02-01 DIAGNOSIS — I25.5 ISCHEMIC CARDIOMYOPATHY: ICD-10-CM

## 2024-02-01 DIAGNOSIS — M89.9 CHRONIC KIDNEY DISEASE-MINERAL AND BONE DISORDER: ICD-10-CM

## 2024-02-01 DIAGNOSIS — I12.9 BENIGN HYPERTENSION WITH CKD (CHRONIC KIDNEY DISEASE) STAGE III (HCC): ICD-10-CM

## 2024-02-01 PROCEDURE — 99214 OFFICE O/P EST MOD 30 MIN: CPT | Performed by: INTERNAL MEDICINE

## 2024-02-01 NOTE — PROGRESS NOTES
NEPHROLOGY OFFICE PROGRESS NOTE   Adan Melendez 58 y.o. male MRN: [id number]  DATE: 02/01/24  Reason for visit: Continued evaluation and management of CKD    ASSESSMENT & PLAN:  Chronic kidney disease, stage IIIA:  Adan's baseline creatinine is 1.3 to 1.4 since 2012. No proteinuria.   Etiology for CKD is not clear - possibly arterio and arteriolosclerosis.   Stable renal function - SCr 1.25.   Treatment: BP control, lipid control, avoidance of nephrotoxics.     Hypertension:  BP is at goal (<130/80)  Rx: Metoprolol succ 12.5 mg OD, Losartan 12.5 mg OD.   No changes.     Coronary artery disease with ICM  10/2023 Echo: EF 45% - improved.   Follow up with cardiology.      Mineral and bone disease:  PTH is at goal.  47.0 in February 2023  Ca and phos are at goal.   Check PTH and Vitamin D.     Patient Instructions   You have chronic kidney disease (CKD) and your kidney function is stable based on Dec 2023 labs.   Please check labs now since you started Losartan last month.   I recommend no changes to your medications today.   Follow up in 6 months - please obtain blood work 1-2 weeks prior to the appointment.     Please avoid taking NSAIDs (Ibuprofen, Motrin, Aleve, Advil, Naproxen, Celebrex, etc.)  Make sure you are eating healthy and have adequate exercise.     SUBJECTIVE / INTERVAL HISTORY:  Adan was seen in the office back in Feb 2023.   In June 2023, he presented to [facility] with complaints of back pain. He was found to have a STEMI and was taken to the cardiac catheterization lab on an urgent basis.  He was found to have 100% occlusion of his proximal LAD.  He had a PCI and a stent placed.  His echocardiogram in the hospital revealed an EF of 35% with G2 DD.  After discharge, he underwent cardiac rehabilitation.  He followed up with cardiology after discharge and was placed on Entresto which he stopped after 3 days because of lightheadedness.  He saw cardiology again in October and was placed  "on lisinopril 2.5 mg daily which caused him to have some mood swings.  He therefore stopped the lisinopril.  He had repeat echocardiogram in October 2023 which showed an EF of 45%.  He recently got a BP cuff. His home readings have been 120s/70s.   Average BP is 123/78 from Dec 31 to Jan 30 over 20 readings.   He started Losartan 12.5 mg daily in early Jan 2024.   He feels fatigued at times but is overall feeling well.   He does have some back pain.     PMH/PSH: HTN, HLP, CKD, CAD, GERD, nephrolithiasis, DDD    Previous work up:   4/22/22 CT abd: stable 3 mm right renal calculus, bilateral simple renal cysts.     3/9/23 Renal US: R 11.4 cm, L 10.9 cm, no hydronephrosis, simple cyst on the R. PVR 21 cc.     ALLERGIES:   Allergies   Allergen Reactions    Turmeric - Food Allergy Anaphylaxis    Niacin Other (See Comments)     Other reaction(s): NIACIN (NIACIN) (facial flushing)    Fenofibrate Dizziness     Other reaction(s): dizziness     REVIEW OF SYSTEMS:  Review of Systems   Constitutional:  Positive for fatigue. Negative for appetite change, chills and fever.   Respiratory:  Negative for cough and shortness of breath.    Cardiovascular:  Negative for chest pain and leg swelling.   Gastrointestinal:  Negative for abdominal pain, diarrhea, nausea and vomiting.   Genitourinary:  Negative for hematuria.   Musculoskeletal:  Positive for back pain. Negative for arthralgias.   Neurological:  Negative for dizziness and light-headedness.     OBJECTIVE:  /78 (BP Location: Left arm, Patient Position: Sitting, Cuff Size: Standard)   Pulse 58   Ht 5' 6\" (1.676 m)   Wt 87.1 kg (192 lb)   SpO2 98%   BMI 30.99 kg/m²   Current Weight:   Body mass index is 30.99 kg/m².  Physical Exam  Constitutional:       General: He is not in acute distress.     Appearance: Normal appearance. He is well-developed. He is not ill-appearing, toxic-appearing or diaphoretic.   HENT:      Head: Normocephalic and atraumatic.   Eyes:      " General: No scleral icterus.     Conjunctiva/sclera: Conjunctivae normal.   Neck:      Vascular: No JVD.   Cardiovascular:      Rate and Rhythm: Normal rate and regular rhythm.      Heart sounds: Normal heart sounds.   Pulmonary:      Effort: Pulmonary effort is normal. No respiratory distress.      Breath sounds: Normal breath sounds.   Abdominal:      General: Bowel sounds are normal.      Palpations: Abdomen is soft.   Musculoskeletal:      Cervical back: Neck supple.      Right lower leg: No edema.      Left lower leg: No edema.   Skin:     General: Skin is warm and dry.   Neurological:      Mental Status: He is alert and oriented to person, place, and time.   Psychiatric:         Mood and Affect: Mood normal.         Behavior: Behavior normal.         Judgment: Judgment normal.       Medications:  Current Outpatient Medications:     aspirin (ECOTRIN LOW STRENGTH) 81 mg EC tablet, Take 1 tablet (81 mg total) by mouth daily, Disp: 90 tablet, Rfl: 3    atorvastatin (LIPITOR) 80 mg tablet, Take 1 tablet (80 mg total) by mouth daily at bedtime, Disp: 90 tablet, Rfl: 3    losartan (COZAAR) 25 mg tablet, Take 0.5 tablets (12.5 mg total) by mouth daily, Disp: 90 tablet, Rfl: 0    metoprolol succinate (TOPROL-XL) 25 mg 24 hr tablet, Take 0.5 tablets (12.5 mg total) by mouth daily, Disp: 45 tablet, Rfl: 3    nitroglycerin (NITROSTAT) 0.4 mg SL tablet, Place 1 tablet (0.4 mg total) under the tongue every 5 (five) minutes as needed for chest pain (Patient not taking: Reported on 12/12/2023), Disp: 30 tablet, Rfl: 0    penicillin V potassium (VEETID) 500 mg tablet, Take 500 mg by mouth 4 (four) times a day (Patient not taking: Reported on 12/12/2023), Disp: , Rfl:     ticagrelor (BRILINTA) 90 MG, Take 1 tablet (90 mg total) by mouth every 12 (twelve) hours, Disp: 180 tablet, Rfl: 3    Laboratory Results:  Results for orders placed or performed in visit on 12/12/23   Lipid Panel with Direct LDL reflex   Result Value Ref  Range    Cholesterol 182 See Comment mg/dL    Triglycerides 232 (H) See Comment mg/dL    HDL, Direct 35 (L) >=40 mg/dL    LDL Calculated 101 (H) 0 - 100 mg/dL   Basic metabolic panel   Result Value Ref Range    Sodium 139 135 - 147 mmol/L    Potassium 4.6 3.5 - 5.3 mmol/L    Chloride 105 96 - 108 mmol/L    CO2 29 21 - 32 mmol/L    ANION GAP 5 mmol/L    BUN 20 5 - 25 mg/dL    Creatinine 1.25 0.60 - 1.30 mg/dL    Glucose, Fasting 95 65 - 99 mg/dL    Calcium 9.0 8.4 - 10.2 mg/dL    eGFR 63 ml/min/1.73sq m

## 2024-02-01 NOTE — PATIENT INSTRUCTIONS
You have chronic kidney disease (CKD) and your kidney function is stable based on Dec 2023 labs.   Please check labs now since you started Losartan last month.   I recommend no changes to your medications today.   Follow up in 6 months - please obtain blood work 1-2 weeks prior to the appointment.     Please avoid taking NSAIDs (Ibuprofen, Motrin, Aleve, Advil, Naproxen, Celebrex, etc.)  Make sure you are eating healthy and have adequate exercise.

## 2024-02-23 ENCOUNTER — APPOINTMENT (OUTPATIENT)
Dept: LAB | Facility: CLINIC | Age: 59
End: 2024-02-23
Payer: COMMERCIAL

## 2024-02-23 ENCOUNTER — TELEPHONE (OUTPATIENT)
Dept: NEPHROLOGY | Facility: CLINIC | Age: 59
End: 2024-02-23

## 2024-02-23 DIAGNOSIS — L98.9 SKIN LESION OF CHEST WALL: ICD-10-CM

## 2024-02-23 DIAGNOSIS — N18.31 STAGE 3A CHRONIC KIDNEY DISEASE (HCC): ICD-10-CM

## 2024-02-23 DIAGNOSIS — R22.2 SUBCUTANEOUS MASS OF BACK: ICD-10-CM

## 2024-02-23 LAB
25(OH)D3 SERPL-MCNC: 19.8 NG/ML (ref 30–100)
ALBUMIN SERPL BCP-MCNC: 4.3 G/DL (ref 3.5–5)
ALP SERPL-CCNC: 94 U/L (ref 34–104)
ALT SERPL W P-5'-P-CCNC: 28 U/L (ref 7–52)
ANION GAP SERPL CALCULATED.3IONS-SCNC: 9 MMOL/L
AST SERPL W P-5'-P-CCNC: 24 U/L (ref 13–39)
BASOPHILS # BLD AUTO: 0.05 THOUSANDS/ÂΜL (ref 0–0.1)
BASOPHILS NFR BLD AUTO: 1 % (ref 0–1)
BILIRUB SERPL-MCNC: 0.97 MG/DL (ref 0.2–1)
BUN SERPL-MCNC: 23 MG/DL (ref 5–25)
CALCIUM SERPL-MCNC: 9.4 MG/DL (ref 8.4–10.2)
CHLORIDE SERPL-SCNC: 104 MMOL/L (ref 96–108)
CO2 SERPL-SCNC: 30 MMOL/L (ref 21–32)
CREAT SERPL-MCNC: 1.37 MG/DL (ref 0.6–1.3)
CREAT UR-MCNC: 150.9 MG/DL
CREAT UR-MCNC: 153.5 MG/DL
EOSINOPHIL # BLD AUTO: 0.13 THOUSAND/ÂΜL (ref 0–0.61)
EOSINOPHIL NFR BLD AUTO: 2 % (ref 0–6)
ERYTHROCYTE [DISTWIDTH] IN BLOOD BY AUTOMATED COUNT: 12.4 % (ref 11.6–15.1)
GFR SERPL CREATININE-BSD FRML MDRD: 56 ML/MIN/1.73SQ M
GLUCOSE P FAST SERPL-MCNC: 106 MG/DL (ref 65–99)
HCT VFR BLD AUTO: 49.3 % (ref 36.5–49.3)
HGB BLD-MCNC: 16.7 G/DL (ref 12–17)
IMM GRANULOCYTES # BLD AUTO: 0.02 THOUSAND/UL (ref 0–0.2)
IMM GRANULOCYTES NFR BLD AUTO: 0 % (ref 0–2)
LYMPHOCYTES # BLD AUTO: 1.56 THOUSANDS/ÂΜL (ref 0.6–4.47)
LYMPHOCYTES NFR BLD AUTO: 25 % (ref 14–44)
MAGNESIUM SERPL-MCNC: 2 MG/DL (ref 1.9–2.7)
MCH RBC QN AUTO: 30.6 PG (ref 26.8–34.3)
MCHC RBC AUTO-ENTMCNC: 33.9 G/DL (ref 31.4–37.4)
MCV RBC AUTO: 90 FL (ref 82–98)
MICROALBUMIN UR-MCNC: 12.7 MG/L
MICROALBUMIN/CREAT 24H UR: 8 MG/G CREATININE (ref 0–30)
MONOCYTES # BLD AUTO: 0.54 THOUSAND/ÂΜL (ref 0.17–1.22)
MONOCYTES NFR BLD AUTO: 9 % (ref 4–12)
NEUTROPHILS # BLD AUTO: 4.02 THOUSANDS/ÂΜL (ref 1.85–7.62)
NEUTS SEG NFR BLD AUTO: 63 % (ref 43–75)
NRBC BLD AUTO-RTO: 0 /100 WBCS
PHOSPHATE SERPL-MCNC: 2.5 MG/DL (ref 2.7–4.5)
PLATELET # BLD AUTO: 178 THOUSANDS/UL (ref 149–390)
PMV BLD AUTO: 11.1 FL (ref 8.9–12.7)
POTASSIUM SERPL-SCNC: 4.7 MMOL/L (ref 3.5–5.3)
PROT SERPL-MCNC: 6.7 G/DL (ref 6.4–8.4)
PROT UR-MCNC: 7 MG/DL
PROT/CREAT UR: 0.05 MG/G{CREAT} (ref 0–0.1)
PTH-INTACT SERPL-MCNC: 39.4 PG/ML (ref 12–88)
RBC # BLD AUTO: 5.46 MILLION/UL (ref 3.88–5.62)
SODIUM SERPL-SCNC: 143 MMOL/L (ref 135–147)
WBC # BLD AUTO: 6.32 THOUSAND/UL (ref 4.31–10.16)

## 2024-02-23 PROCEDURE — 80053 COMPREHEN METABOLIC PANEL: CPT

## 2024-02-23 PROCEDURE — 83735 ASSAY OF MAGNESIUM: CPT

## 2024-02-23 PROCEDURE — 83970 ASSAY OF PARATHORMONE: CPT

## 2024-02-23 PROCEDURE — 85025 COMPLETE CBC W/AUTO DIFF WBC: CPT

## 2024-02-23 PROCEDURE — 84100 ASSAY OF PHOSPHORUS: CPT

## 2024-02-23 PROCEDURE — 82043 UR ALBUMIN QUANTITATIVE: CPT

## 2024-02-23 PROCEDURE — 36415 COLL VENOUS BLD VENIPUNCTURE: CPT

## 2024-02-23 PROCEDURE — 82306 VITAMIN D 25 HYDROXY: CPT

## 2024-02-23 PROCEDURE — 84156 ASSAY OF PROTEIN URINE: CPT

## 2024-02-23 PROCEDURE — 82570 ASSAY OF URINE CREATININE: CPT

## 2024-02-23 RX ORDER — MULTIVIT-MIN/IRON/FOLIC ACID/K 18-600-40
2000 CAPSULE ORAL
COMMUNITY

## 2024-02-23 NOTE — TELEPHONE ENCOUNTER
Message left on patient's VM that labs of  2/23/24 show  stable kidney function per  Dr. Lim.  Phos and Vitamin D levels low.  Per Dr. Lim to start OTC Vitamin D3  2000 Units daily.      ----- Message from Vikram Lim MD sent at 2/23/2024  3:16 PM EST -----  Please inform patient that most recent labs (2/23/24) show that kidney function is stable. His phosphorus level and vitamin D level are both low. Please have him start OTC Vitamin D3 2000 units daily. The phosphorus level should improve with Vitamin D as well.

## 2024-02-26 ENCOUNTER — OFFICE VISIT (OUTPATIENT)
Dept: INTERNAL MEDICINE CLINIC | Facility: CLINIC | Age: 59
End: 2024-02-26
Payer: COMMERCIAL

## 2024-02-26 VITALS
TEMPERATURE: 99.1 F | WEIGHT: 191 LBS | HEIGHT: 66 IN | BODY MASS INDEX: 30.7 KG/M2 | SYSTOLIC BLOOD PRESSURE: 120 MMHG | HEART RATE: 62 BPM | DIASTOLIC BLOOD PRESSURE: 82 MMHG | OXYGEN SATURATION: 99 % | RESPIRATION RATE: 14 BRPM

## 2024-02-26 DIAGNOSIS — Z11.4 SCREENING FOR HIV (HUMAN IMMUNODEFICIENCY VIRUS): ICD-10-CM

## 2024-02-26 DIAGNOSIS — I25.5 ISCHEMIC CARDIOMYOPATHY: ICD-10-CM

## 2024-02-26 DIAGNOSIS — E55.9 VITAMIN D DEFICIENCY: ICD-10-CM

## 2024-02-26 DIAGNOSIS — N18.31 STAGE 3A CHRONIC KIDNEY DISEASE (HCC): ICD-10-CM

## 2024-02-26 DIAGNOSIS — I25.10 CORONARY ARTERY DISEASE INVOLVING NATIVE CORONARY ARTERY OF NATIVE HEART WITHOUT ANGINA PECTORIS: Primary | ICD-10-CM

## 2024-02-26 DIAGNOSIS — I12.9 BENIGN HYPERTENSION WITH CKD (CHRONIC KIDNEY DISEASE) STAGE III (HCC): ICD-10-CM

## 2024-02-26 DIAGNOSIS — Z11.59 NEED FOR HEPATITIS C SCREENING TEST: ICD-10-CM

## 2024-02-26 DIAGNOSIS — Z00.00 HEALTH CARE MAINTENANCE: ICD-10-CM

## 2024-02-26 DIAGNOSIS — N18.30 BENIGN HYPERTENSION WITH CKD (CHRONIC KIDNEY DISEASE) STAGE III (HCC): ICD-10-CM

## 2024-02-26 DIAGNOSIS — E78.2 MIXED HYPERLIPIDEMIA: ICD-10-CM

## 2024-02-26 PROCEDURE — 99214 OFFICE O/P EST MOD 30 MIN: CPT

## 2024-02-26 RX ORDER — MELATONIN
2000 DAILY
Qty: 14 TABLET | Refills: 0 | Status: CANCELLED | OUTPATIENT
Start: 2024-02-26 | End: 2024-03-11

## 2024-02-26 NOTE — ASSESSMENT & PLAN NOTE
- Remains compliant on medications including aspirin, Brilinta, high-intensity atorvastatin, and metoprolol succinate. He continues to attend cardiac rehabilitation.   - Patient to continue DAPT for one year (June 2024) as recommended by cardiology.  - Hgb A1c on 6/19 is borderline pre-diabetic (5.6)  - Can re-check A1c and lipid panel in three months  - Discussion was held with patient regarding regularly weekly exercise and dietary modification. He has been limiting his carbohydrate and sodium intake and eating more fruits, vegetables, and salmon.

## 2024-02-26 NOTE — PROGRESS NOTES
INTERNAL MEDICINE FOLLOW-UP OFFICE VISIT  [facility] - [facility] INTERNAL MEDICINE NURYS    NAME: Adan Melendez  AGE: 58 y.o. SEX: male  : 1965     DATE: 2024     Assessment and Plan:     1. Coronary artery disease involving native coronary artery of native heart without angina pectoris  Assessment & Plan:  - Remains compliant on medications including aspirin, Brilinta, high-intensity atorvastatin, and metoprolol succinate. He continues to attend cardiac rehabilitation.   - Patient to continue DAPT for one year (2024) as recommended by cardiology.  - Hgb A1c on  is borderline pre-diabetic (5.6)  - Can re-check A1c and lipid panel in three months  - Discussion was held with patient regarding regularly weekly exercise and dietary modification. He has been limiting his carbohydrate and sodium intake and eating more fruits, vegetables, and salmon.    Orders:  -     Lipid panel; Future; Expected date: 2024    2. Benign hypertension with CKD (chronic kidney disease) stage III (HCC)  Assessment & Plan:  Lab Results   Component Value Date    EGFR 56 2024    EGFR 63 2023    EGFR 58 2023    CREATININE 1.37 (H) 2024    CREATININE 1.25 2023    CREATININE 1.34 (H) 2023     - Patient remains on metoprolol succinate 12.5 mg daily and losartan 12.5 mg daily  - Blood pressures have remained below goal < 130/80  - Patient recently had an outpatient visit with nephrology  - Continue outpatient follow-up with nephrology  - Monitor albumin creatinine ratio      3. Stage 3a chronic kidney disease (HCC)  Assessment & Plan:  Lab Results   Component Value Date    EGFR 56 2024    EGFR 63 2023    EGFR 58 2023    CREATININE 1.37 (H) 2024    CREATININE 1.25 2023    CREATININE 1.34 (H) 2023     Creatinine has remained stable and baseline  No significant proteinuria per recent albumin creatinine ratio  Outpatient follow-up with  nephrology  I advised the patient to continue to reduce carbohydrate intake to prevent progression to diabetes      4. Ischemic cardiomyopathy  Assessment & Plan:  History of ischemic cardiopathy in the setting of STEMI in June 2023 with LVEF of 45% on echo from 10/3. Global longitudinal strain is reduced at -11% with marked regional reduction in the anteroseptum, anterior wall and apex. Diastolic function is mildly abnormal, consistent with grade I (abnormal) relaxation.   Switched from Entresto to lisinopril during recent cardiology visit due to intolerance of Entresto.  Patient is now endorsing side effect of mood swings while taking lisinopril.  Continue metoprolol succinate 12.5 mg daily  Continue cardiac rehabilitation  Follows with outpatient cardiologist Dr. Joyce     Plan:  Continue losartan half a tab (12.5 mg) daily and monitor for side effects. Patient has had good tolerance so far.  Continue metoprolol succinate 12.5 mg daily  Maintain blood pressure goal below < 130/80  Continue high intensity statin; discussion was held with patient regarding the importance of lowering LDL level. I stressed the importance of consuming saturated fats, cheeses, and red meats in moderation.      5. Need for hepatitis C screening test  -     Hepatitis C Antibody; Future; Expected date: 05/27/2024    6. Screening for HIV (human immunodeficiency virus)  -     HIV 1/2 AG/AB w Reflex [facility] for 2 yr old and above; Future; Expected date: 05/27/2024    7. Health care maintenance  -     Hemoglobin A1C; Future; Expected date: 05/27/2024  -     CBC and differential; Future; Expected date: 05/27/2024  -     Comprehensive metabolic panel; Future; Expected date: 05/27/2024    8. Vitamin D deficiency  -     Vitamin D 25 hydroxy; Future; Expected date: 05/27/2024    9. Mixed hyperlipidemia  Assessment & Plan:  Last lipid panel in December 2023 with LDL > 100  Continue atorvastatin 80 mg daily  Will recheck lipid panel in three  months          Return in about 3 months (around 5/26/2024) for Annual physical.     Chief Complaint:     Chief Complaint   Patient presents with    Follow-up     States had labs done        History of Present Illness:     Adan Melendez is a 58 year old male with PMH of CAD, MI s/p LAD stent in June 2023, dyslipidemia, CKD stage 3A, sciatica, prediabetes, and HTN who presents to the office for follow-up. Patient states he feels well during today's office visit and offers no significant complaints.  He states he continues to exercise on a routine basis and has been monitored for the foods he is consuming.  Specifically he has been cutting down on carbohydrates.  States he is monitors his blood pressures routinely at home with good control while on losartan and metoprolol.  He states he has not had any significant side effects while taking losartan. He states he has felt a bit weaker than normal which can be attributed to low vitamin D levels. He was started on vitamin D supplementation by nephrology.    The following portions of the patient's history were reviewed and updated as appropriate: allergies, current medications, past family history, past medical history, past social history, past surgical history and problem list.     Review of Systems:     Review of Systems   Constitutional:  Negative for chills and fever.   HENT:  Negative for rhinorrhea and voice change.    Eyes:  Negative for visual disturbance.   Respiratory:  Negative for shortness of breath and wheezing.    Cardiovascular:  Negative for chest pain and palpitations.   Gastrointestinal:  Negative for blood in stool, nausea and vomiting.   Endocrine: Negative for polydipsia and polyuria.   Genitourinary:  Negative for dysuria and hematuria.   Musculoskeletal:  Negative for myalgias and neck stiffness.   Skin:  Negative for color change and rash.   Neurological:  Negative for weakness and light-headedness.   Psychiatric/Behavioral:  Negative for  "agitation and confusion.         Problem List:     Patient Active Problem List   Diagnosis    Hyperglyceridemia, pure    Impaired fasting glucose    Chronic right-sided low back pain without sciatica    Right flank pain    Stage 3a chronic kidney disease (HCC)    Benign hypertension with CKD (chronic kidney disease) stage III (HCC)    Bilateral renal cysts    Grade II hemorrhoids    History of colonic polyps    History of urinary stone    Kidney stone    Elevated bilirubin    Mixed hyperlipidemia    Chronic kidney disease-mineral and bone disorder    Back strain    Sciatic pain, right    Acute ST elevation myocardial infarction (STEMI) due to occlusion of left anterior descending (LAD) coronary artery (HCC)    Coronary artery disease involving native coronary artery of native heart without angina pectoris    Ischemic cardiomyopathy    Skin lesion of chest wall        Objective:     /82 (BP Location: Left arm, Patient Position: Sitting, Cuff Size: Large)   Pulse 62   Temp 99.1 °F (37.3 °C) (Tympanic)   Resp 14   Ht 5' 6\" (1.676 m)   Wt 86.6 kg (191 lb)   SpO2 99%   BMI 30.83 kg/m²     Physical Exam  Constitutional:       General: He is not in acute distress.     Appearance: Normal appearance. He is not ill-appearing.   HENT:      Head: Normocephalic and atraumatic.      Right Ear: External ear normal.      Left Ear: External ear normal.      Nose: Nose normal.      Mouth/Throat:      Mouth: Mucous membranes are moist.   Eyes:      Extraocular Movements: Extraocular movements intact.      Conjunctiva/sclera: Conjunctivae normal.   Cardiovascular:      Rate and Rhythm: Normal rate and regular rhythm.      Pulses: Normal pulses.      Heart sounds: Normal heart sounds. No murmur heard.  Pulmonary:      Effort: Pulmonary effort is normal. No respiratory distress.      Breath sounds: Normal breath sounds. No decreased breath sounds, wheezing, rhonchi or rales.   Abdominal:      General: Abdomen is flat. Bowel " sounds are normal. There is no distension.      Palpations: Abdomen is soft.      Tenderness: There is no abdominal tenderness.   Musculoskeletal:      Cervical back: Neck supple.      Right lower leg: No edema.      Left lower leg: No edema.   Skin:     General: Skin is warm and dry.   Neurological:      General: No focal deficit present.      Mental Status: He is alert and oriented to person, place, and time.      Motor: No weakness.   Psychiatric:         Attention and Perception: Attention normal.         Mood and Affect: Mood normal.         Behavior: Behavior normal. Behavior is cooperative.         Pertinent Laboratory/Diagnostic Studies:    Laboratory Results: I have personally reviewed the pertinent laboratory results/reports     CBC:   Results from Last 12 Months   Lab Units 02/23/24  0832   WBC Thousand/uL 6.32   RBC Million/uL 5.46   HEMOGLOBIN g/dL 16.7   HEMATOCRIT % 49.3   MCV fL 90   MCH pg 30.6   MCHC g/dL 33.9   RDW % 12.4   MPV fL 11.1   PLATELETS Thousands/uL 178   NRBC AUTO /100 WBCs 0   NEUTROS PCT % 63   LYMPHS PCT % 25   MONOS PCT % 9   EOS PCT % 2   BASOS PCT % 1   NEUTROS ABS Thousands/µL 4.02   LYMPHS ABS Thousands/µL 1.56   MONOS ABS Thousand/µL 0.54   EOS ABS Thousand/µL 0.13     Chemistry Profile:   Results from Last 12 Months   Lab Units 02/23/24  0832 12/12/23  1101 09/29/23  0859   POTASSIUM mmol/L 4.7   < > 4.9   CHLORIDE mmol/L 104   < > 103   CO2 mmol/L 30   < > 28   BUN mg/dL 23   < > 20   CREATININE mg/dL 1.37*   < > 1.34*   GLUCOSE FASTING mg/dL 106*   < >  --    GLUCOSE RANDOM mg/dL  --   --  106   CALCIUM mg/dL 9.4   < > 9.5   MAGNESIUM mg/dL 2.0  --   --    PHOSPHORUS mg/dL 2.5*  --   --    AST U/L 24  --   --    ALT U/L 28  --   --    ALK PHOS U/L 94  --   --    EGFR ml/min/1.73sq m 56   < > 58    < > = values in this interval not displayed.       Radiology/Other Diagnostic Testing Results: I have personally reviewed pertinent reports.      Nichole Christensen, DO [facility]  INTERNAL MEDICINE [place]

## 2024-02-26 NOTE — ASSESSMENT & PLAN NOTE
Lab Results   Component Value Date    EGFR 56 02/23/2024    EGFR 63 12/12/2023    EGFR 58 09/29/2023    CREATININE 1.37 (H) 02/23/2024    CREATININE 1.25 12/12/2023    CREATININE 1.34 (H) 09/29/2023     - Patient remains on metoprolol succinate 12.5 mg daily and losartan 12.5 mg daily  - Blood pressures have remained below goal < 130/80  - Patient recently had an outpatient visit with nephrology  - Continue outpatient follow-up with nephrology  - Monitor albumin creatinine ratio

## 2024-02-26 NOTE — ASSESSMENT & PLAN NOTE
Last lipid panel in December 2023 with LDL > 100  Continue atorvastatin 80 mg daily  Will recheck lipid panel in three months

## 2024-02-26 NOTE — ASSESSMENT & PLAN NOTE
Lab Results   Component Value Date    EGFR 56 02/23/2024    EGFR 63 12/12/2023    EGFR 58 09/29/2023    CREATININE 1.37 (H) 02/23/2024    CREATININE 1.25 12/12/2023    CREATININE 1.34 (H) 09/29/2023     Creatinine has remained stable and baseline  No significant proteinuria per recent albumin creatinine ratio  Outpatient follow-up with nephrology  I advised the patient to continue to reduce carbohydrate intake to prevent progression to diabetes

## 2024-02-26 NOTE — ASSESSMENT & PLAN NOTE
History of ischemic cardiopathy in the setting of STEMI in June 2023 with LVEF of 45% on echo from 10/3. Global longitudinal strain is reduced at -11% with marked regional reduction in the anteroseptum, anterior wall and apex. Diastolic function is mildly abnormal, consistent with grade I (abnormal) relaxation.   Switched from Entresto to lisinopril during recent cardiology visit due to intolerance of Entresto.  Patient is now endorsing side effect of mood swings while taking lisinopril.  Continue metoprolol succinate 12.5 mg daily  Continue cardiac rehabilitation  Follows with outpatient cardiologist Dr. Joyce     Plan:  Continue losartan half a tab (12.5 mg) daily and monitor for side effects. Patient has had good tolerance so far.  Continue metoprolol succinate 12.5 mg daily  Maintain blood pressure goal below < 130/80  Continue high intensity statin; discussion was held with patient regarding the importance of lowering LDL level. I stressed the importance of consuming saturated fats, cheeses, and red meats in moderation.

## 2024-05-03 ENCOUNTER — TELEPHONE (OUTPATIENT)
Dept: PLASTIC SURGERY | Facility: CLINIC | Age: 59
End: 2024-05-03

## 2024-05-03 DIAGNOSIS — L98.9 SKIN LESION OF CHEST WALL: Primary | ICD-10-CM

## 2024-05-06 ENCOUNTER — TELEPHONE (OUTPATIENT)
Dept: CARDIOLOGY CLINIC | Facility: CLINIC | Age: 59
End: 2024-05-06

## 2024-05-06 NOTE — TELEPHONE ENCOUNTER
The following pt is having an upcoming surgery on 7/2/24 for  EXCISION OF CHEST LESION AND LEFT BACK SUBCUTANEOUS MASS WITH COMPLEX CLOSURE. (Chest)       SKIN GRAFT SPLIT THICKNESS CHEST LESION (Chest)       FLAP LOCAL CHEST LESION (Chest).     LOV was on 10/5/23.    Pt is on Brilinta. Surgeon is asking for a hold on Brilinta.     No upcoming appointment with you.    CC letter sent over to your in basket. Please advise. Thank you.

## 2024-05-08 ENCOUNTER — TELEPHONE (OUTPATIENT)
Dept: INTERNAL MEDICINE CLINIC | Facility: CLINIC | Age: 59
End: 2024-05-08

## 2024-05-08 NOTE — TELEPHONE ENCOUNTER
[facility] platic and reconstruction send surgical clearance will put in dr mcdaniel box, as patient surgery got cancelled till July.

## 2024-05-13 ENCOUNTER — OFFICE VISIT (OUTPATIENT)
Dept: CARDIOLOGY CLINIC | Facility: CLINIC | Age: 59
End: 2024-05-13
Payer: COMMERCIAL

## 2024-05-13 ENCOUNTER — TELEPHONE (OUTPATIENT)
Age: 59
End: 2024-05-13

## 2024-05-13 VITALS
DIASTOLIC BLOOD PRESSURE: 84 MMHG | BODY MASS INDEX: 29.89 KG/M2 | WEIGHT: 186 LBS | HEART RATE: 64 BPM | SYSTOLIC BLOOD PRESSURE: 136 MMHG | HEIGHT: 66 IN

## 2024-05-13 DIAGNOSIS — I25.10 CORONARY ARTERY DISEASE INVOLVING NATIVE CORONARY ARTERY OF NATIVE HEART WITHOUT ANGINA PECTORIS: Primary | ICD-10-CM

## 2024-05-13 DIAGNOSIS — I25.5 ISCHEMIC CARDIOMYOPATHY: ICD-10-CM

## 2024-05-13 DIAGNOSIS — I21.3 ST ELEVATION MYOCARDIAL INFARCTION (STEMI), UNSPECIFIED ARTERY (HCC): ICD-10-CM

## 2024-05-13 DIAGNOSIS — E78.2 MIXED HYPERLIPIDEMIA: ICD-10-CM

## 2024-05-13 DIAGNOSIS — Z01.810 PREOP CARDIOVASCULAR EXAM: ICD-10-CM

## 2024-05-13 PROCEDURE — 99214 OFFICE O/P EST MOD 30 MIN: CPT | Performed by: INTERNAL MEDICINE

## 2024-05-13 RX ORDER — LOSARTAN POTASSIUM 25 MG/1
12.5 TABLET ORAL DAILY
Qty: 90 TABLET | Refills: 3 | Status: SHIPPED | OUTPATIENT
Start: 2024-05-13

## 2024-05-13 RX ORDER — ATORVASTATIN CALCIUM 80 MG/1
80 TABLET, FILM COATED ORAL
Qty: 90 TABLET | Refills: 3 | Status: SHIPPED | OUTPATIENT
Start: 2024-05-13

## 2024-05-13 RX ORDER — LISINOPRIL 10 MG/1
10 TABLET ORAL DAILY
Qty: 90 TABLET | Refills: 3 | Status: SHIPPED | OUTPATIENT
Start: 2024-05-13 | End: 2024-05-13

## 2024-05-13 RX ORDER — METOPROLOL SUCCINATE 25 MG/1
12.5 TABLET, EXTENDED RELEASE ORAL DAILY
Qty: 45 TABLET | Refills: 3 | Status: SHIPPED | OUTPATIENT
Start: 2024-05-13

## 2024-05-13 NOTE — PATIENT INSTRUCTIONS
Last day of Brilinta -- June 18th 2024 -- then stop indefinitely  Continue aspirin 81 mg daily without any interruptions for upcoming surgery or any other reason unless specifically directed by cardiology      Lipoprotein(a) -- check now    Start Repatha once it arrives    Labwork again in August (with your kidney doctor's orders) -- Lipoprotein(a) again, Lipid panel

## 2024-05-13 NOTE — PROGRESS NOTES
Cardiology Outpatient Follow-Up Note - Adan Melendez 58 y.o. male MRN: [id number]    Assessment/Plan:    1. Coronary artery disease involving native coronary artery of native heart without angina pectoris  No angina.  STEMI June 2023. Continue DAPT until June 2024. Stop Brilinta 6/18/24. Continue aspirin 81 mg daily.  Continue metoprolol XL 12.5 mg daily  Continue atorvastatin 80 mg daily -- LDL above goal. Goal LDL < 55 mg/dL for secondary prevention. Add Repatha 140 mg SC q14d.   - losartan (COZAAR) 25 mg tablet; Take 0.5 tablets (12.5 mg total) by mouth daily  Dispense: 90 tablet; Refill: 3  - Evolocumab 140 MG/ML SOAJ; Inject 1 mL (140 mg total) under the skin every 14 (fourteen) days  Dispense: 6 mL; Refill: 3    2. Ischemic cardiomyopathy  Very well compensated without clinical CHF.  Continue BB and ARB. Currently on low dose losartan 12.5 mg daily due to lightheadedness on high intensity agents. Particularly concerning as he works on [employer].   - losartan (COZAAR) 25 mg tablet; Take 0.5 tablets (12.5 mg total) by mouth daily  Dispense: 90 tablet; Refill: 3  - Evolocumab 140 MG/ML SOAJ; Inject 1 mL (140 mg total) under the skin every 14 (fourteen) days  Dispense: 6 mL; Refill: 3    3. Mixed hyperlipidemia  As above, add PCSK9i for goal LDL < 55 m/dL. Last LDL was > 100 mg/dL on on atorvastatin 80 mg daily.   - Evolocumab 140 MG/ML SOAJ; Inject 1 mL (140 mg total) under the skin every 14 (fourteen) days  Dispense: 6 mL; Refill: 3  - Lipoprotein A (LPA); Future  - Lipid Panel with Direct LDL reflex; Future  - Lipoprotein A (LPA); Future    4. ST elevation myocardial infarction (STEMI), unspecified artery (HCC)  - atorvastatin (LIPITOR) 80 mg tablet; Take 1 tablet (80 mg total) by mouth daily at bedtime  Dispense: 90 tablet; Refill: 3  - metoprolol succinate (TOPROL-XL) 25 mg 24 hr tablet; Take 0.5 tablets (12.5 mg total) by mouth daily  Dispense: 45 tablet; Refill: 3  - Evolocumab 140 MG/ML SOAJ; Inject 1 mL  (140 mg total) under the skin every 14 (fourteen) days  Dispense: 6 mL; Refill: 3    5. Preop cardiovascular exam  No cardiac contraindication to planned subcutaneous mass excision.  Low cardiac risk.  Surgery is elective, scheduled > 12 months from STEMI.  OK to stop Brilinta in June, therapy will be complete, and only continue aspirin into perioperative period.       I suspect his hand cramping/tingling is due to carpal tunnel syndrome. Follow up with PCP.       We will see Adan Melendez back in 6 months for routine follow-up.    Subjective:     HPI: Adan Melendez is a 58 y.o. year old male with CAD (STEMI June 2023 s/p PABLO to pLAD), ICMP with LVEF 45%, CKD, HLD, pre-DM presenting for follow-up.     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   Otherwise no cardiac complaints.       Cardiac Testing:    Echo 10/3/23   Interpretation Summary         Left Ventricle: Left ventricular cavity size is normal. Wall thickness is normal. The left ventricular ejection fraction is 45% by biplane measurement.. Systolic function is mildly reduced. Global longitudinal strain is reduced at -11% with marked regional reduction in the anteroseptum, anterior wall and apex. Diastolic function is mildly abnormal, consistent with grade I (abnormal) relaxation.  Left atrial filling pressure is normal.    The following segments are akinetic: mid anteroseptal, apical anterior, apical septal, apical lateral and apex.    All other segments are normal.    Right Ventricle: Right ventricular cavity size is normal. Systolic function is normal.    Tricuspid Valve: There is mild regurgitation. The tricuspid valve regurgitation jet is eccentric.     Strain was performed to quantify interventricular dyssynchrony and evaluate components of myocardial function due to CAD, MI. ICM . Results from the utilization of Strain Analysis are listed in the report below.      Cardiac Cath 06/18/2023  "  Impression         Prox LAD lesion is 100% stenosed.     Single-vessel coronary disease with a total occlusion of the proximal LAD representing the culprit for the patient's anterior STEMI.  Successful IVUS-guided PCI, proximal LAD, with a reduction in stenosis from 100% to 0% following pre-dilation and placement of a 4.0x28mm drug-eluting stent.  Mildly elevated LVEDP (20 mmHg).  Plan: DAPT, statin, beta-blocker, echocardiogram, guideline-directed medical therapy if LVEDP is significantly reduced, cardiac rehabilitation.             EKGs, personally reviewed:  N/a    Relevant Labs & Results:  Lipid panel 06/19/23 -- , , HDL 33, LDL 85 mg/dL   Lipid panel 12/12/23 -- , , HDL 35,  mg/dL       ROS:  Review of Systems:  Review of Systems    Objective:     Vitals:   Vitals:    05/13/24 0840   BP: 136/84   BP Location: Left arm   Patient Position: Sitting   Cuff Size: Standard   Pulse: 64   Weight: 84.4 kg (186 lb)   Height: 5' 6\" (1.676 m)    Body surface area is 1.94 meters squared.  Wt Readings from Last 3 Encounters:   05/13/24 84.4 kg (186 lb)   02/26/24 86.6 kg (191 lb)   02/01/24 87.1 kg (192 lb)       Physical Exam:  General: Adan Melendez is a well appearing male, in no acute distress, sitting comfortably  HEENT: moist mucous membranes, EOMI  Neck:  No JVD, supple, trachea midline  Cardiovascular: unremarkable S1/S2, regular rate and rhythm, no murmurs, rubs or gallops  Pulmonary: normal respiratory effort, CTAB  Abdomen: soft and nondistended  Extremities: No lower extremity edema. Warm and well perfused extremities.  Neuro: no focal motor deficits, AAOx3 (person, place, time)  Psych: Normal mood and affect, cooperative        Medications (at the START of this encounter):  Outpatient Medications Prior to Visit   Medication Sig Dispense Refill    aspirin (ECOTRIN LOW STRENGTH) 81 mg EC tablet Take 1 tablet (81 mg total) by mouth daily 90 tablet 3    atorvastatin (LIPITOR) 80 " "mg tablet Take 1 tablet (80 mg total) by mouth daily at bedtime 90 tablet 3    Cholecalciferol (Vitamin D) 50 MCG (2000 UT) CAPS Take 2,000 Units by mouth daily in the early morning      losartan (COZAAR) 25 mg tablet Take 0.5 tablets (12.5 mg total) by mouth daily 90 tablet 0    metoprolol succinate (TOPROL-XL) 25 mg 24 hr tablet Take 0.5 tablets (12.5 mg total) by mouth daily 45 tablet 3    ticagrelor (BRILINTA) 90 MG Take 1 tablet (90 mg total) by mouth every 12 (twelve) hours 180 tablet 3     No facility-administered medications prior to visit.                 Time Spent:  Total time (face-to-face and non-face-to-face) spent on today's visit was 23 minutes. This includes preparation for the visits (i.e. reviewing test results), performance of a medically appropriate history and examination, and orders for medications, tests or other procedures. This time is exclusive of procedures performed and time spent teaching.      This note was completed in part utilizing Dragon Medical One voice recognition software. Grammatical errors, random word insertion, spelling mistakes, occasional wrong word or \"sound-alike\" substitutions and incomplete sentences may be an occasional consequence of the system secondary to software limitations, ambient noise and hardware issues. At the time of dictation, efforts were made to edit, clarify and /or correct errors.  Please read the chart carefully and recognize, using context, where substitutions have occurred.  If you have any questions or concerns about the context, text or information contained within the body of this dictation, please contact myself, the provider, for further clarification.    "

## 2024-05-13 NOTE — TELEPHONE ENCOUNTER
PA for REPATHA 140 MG/ML SUBMITTED     Submitted via    []CMM-KEY   [x]Surescripts-Case ID # [id number]  []Faxed to plan   []Other website   []Phone call Case ID #     Office notes sent, clinical questions answered. Awaiting determination    Turnaround time for your insurance to make a decision on your Prior Authorization can take 7-21 business days.

## 2024-05-14 NOTE — TELEPHONE ENCOUNTER
PA for REPATHA 140 MG/ML  Approved     Date(s) approved Authorized from April 13, 2024 to May 13, 2025        Case #[id number]     Patient advised by          [x] Simpleshowhart Message  [] Phone call   [x]LMOM  []L/M to call office as no active Communication consent on file  []Unable to leave detailed message as VM not approved on Communication consent       Pharmacy advised by    [x]Fax  []Phone call    Approval letter scanned into Media No

## 2024-06-06 ENCOUNTER — TELEPHONE (OUTPATIENT)
Dept: ANESTHESIOLOGY | Facility: CLINIC | Age: 59
End: 2024-06-06

## 2024-06-18 ENCOUNTER — TELEPHONE (OUTPATIENT)
Age: 59
End: 2024-06-18

## 2024-06-18 ENCOUNTER — TELEPHONE (OUTPATIENT)
Dept: PLASTIC SURGERY | Facility: CLINIC | Age: 59
End: 2024-06-18

## 2024-06-18 NOTE — TELEPHONE ENCOUNTER
Patient is looking to cancel and reschedule his sx with Dr. Simons on 7/2/24. Patient stated he does not  his phone..he can be emailed or his wife can be reached at [phone]. Thanks!

## 2024-08-01 ENCOUNTER — APPOINTMENT (OUTPATIENT)
Dept: LAB | Facility: CLINIC | Age: 59
End: 2024-08-01
Payer: COMMERCIAL

## 2024-08-01 DIAGNOSIS — E55.9 VITAMIN D DEFICIENCY: ICD-10-CM

## 2024-08-01 DIAGNOSIS — E78.2 MIXED HYPERLIPIDEMIA: ICD-10-CM

## 2024-08-01 DIAGNOSIS — Z00.00 HEALTH CARE MAINTENANCE: ICD-10-CM

## 2024-08-01 DIAGNOSIS — I25.10 CORONARY ARTERY DISEASE INVOLVING NATIVE CORONARY ARTERY OF NATIVE HEART WITHOUT ANGINA PECTORIS: ICD-10-CM

## 2024-08-01 DIAGNOSIS — Z11.59 NEED FOR HEPATITIS C SCREENING TEST: ICD-10-CM

## 2024-08-01 DIAGNOSIS — Z11.4 SCREENING FOR HIV (HUMAN IMMUNODEFICIENCY VIRUS): ICD-10-CM

## 2024-08-01 LAB
25(OH)D3 SERPL-MCNC: 42.7 NG/ML (ref 30–100)
ALBUMIN SERPL BCG-MCNC: 4.1 G/DL (ref 3.5–5)
ALP SERPL-CCNC: 83 U/L (ref 34–104)
ALT SERPL W P-5'-P-CCNC: 25 U/L (ref 7–52)
ANION GAP SERPL CALCULATED.3IONS-SCNC: 7 MMOL/L (ref 4–13)
AST SERPL W P-5'-P-CCNC: 22 U/L (ref 13–39)
BASOPHILS # BLD AUTO: 0.04 THOUSANDS/ÂΜL (ref 0–0.1)
BASOPHILS NFR BLD AUTO: 1 % (ref 0–1)
BILIRUB SERPL-MCNC: 0.76 MG/DL (ref 0.2–1)
BUN SERPL-MCNC: 24 MG/DL (ref 5–25)
CALCIUM SERPL-MCNC: 9.2 MG/DL (ref 8.4–10.2)
CHLORIDE SERPL-SCNC: 106 MMOL/L (ref 96–108)
CHOLEST SERPL-MCNC: 126 MG/DL
CO2 SERPL-SCNC: 30 MMOL/L (ref 21–32)
CREAT SERPL-MCNC: 1.27 MG/DL (ref 0.6–1.3)
CREAT UR-MCNC: 188.8 MG/DL
EOSINOPHIL # BLD AUTO: 0.12 THOUSAND/ÂΜL (ref 0–0.61)
EOSINOPHIL NFR BLD AUTO: 2 % (ref 0–6)
ERYTHROCYTE [DISTWIDTH] IN BLOOD BY AUTOMATED COUNT: 12.1 % (ref 11.6–15.1)
EST. AVERAGE GLUCOSE BLD GHB EST-MCNC: 120 MG/DL
GFR SERPL CREATININE-BSD FRML MDRD: 61 ML/MIN/1.73SQ M
GLUCOSE P FAST SERPL-MCNC: 110 MG/DL (ref 65–99)
HBA1C MFR BLD: 5.8 %
HCT VFR BLD AUTO: 47 % (ref 36.5–49.3)
HCV AB SER QL: NORMAL
HDLC SERPL-MCNC: 37 MG/DL
HGB BLD-MCNC: 15.8 G/DL (ref 12–17)
HIV 1+2 AB+HIV1 P24 AG SERPL QL IA: NORMAL
HIV 2 AB SERPL QL IA: NORMAL
HIV1 AB SERPL QL IA: NORMAL
HIV1 P24 AG SERPL QL IA: NORMAL
IMM GRANULOCYTES # BLD AUTO: 0.01 THOUSAND/UL (ref 0–0.2)
IMM GRANULOCYTES NFR BLD AUTO: 0 % (ref 0–2)
LDLC SERPL CALC-MCNC: 65 MG/DL (ref 0–100)
LYMPHOCYTES # BLD AUTO: 1.25 THOUSANDS/ÂΜL (ref 0.6–4.47)
LYMPHOCYTES NFR BLD AUTO: 24 % (ref 14–44)
MCH RBC QN AUTO: 30.7 PG (ref 26.8–34.3)
MCHC RBC AUTO-ENTMCNC: 33.6 G/DL (ref 31.4–37.4)
MCV RBC AUTO: 91 FL (ref 82–98)
MICROALBUMIN UR-MCNC: 7.9 MG/L
MICROALBUMIN/CREAT 24H UR: 4 MG/G CREATININE (ref 0–30)
MONOCYTES # BLD AUTO: 0.49 THOUSAND/ÂΜL (ref 0.17–1.22)
MONOCYTES NFR BLD AUTO: 10 % (ref 4–12)
NEUTROPHILS # BLD AUTO: 3.27 THOUSANDS/ÂΜL (ref 1.85–7.62)
NEUTS SEG NFR BLD AUTO: 63 % (ref 43–75)
NRBC BLD AUTO-RTO: 0 /100 WBCS
PHOSPHATE SERPL-MCNC: 3.2 MG/DL (ref 2.7–4.5)
PLATELET # BLD AUTO: 182 THOUSANDS/UL (ref 149–390)
PMV BLD AUTO: 11.3 FL (ref 8.9–12.7)
POTASSIUM SERPL-SCNC: 4.8 MMOL/L (ref 3.5–5.3)
PROT SERPL-MCNC: 6.8 G/DL (ref 6.4–8.4)
RBC # BLD AUTO: 5.14 MILLION/UL (ref 3.88–5.62)
SODIUM SERPL-SCNC: 143 MMOL/L (ref 135–147)
TRIGL SERPL-MCNC: 120 MG/DL
WBC # BLD AUTO: 5.18 THOUSAND/UL (ref 4.31–10.16)

## 2024-08-01 PROCEDURE — 82306 VITAMIN D 25 HYDROXY: CPT

## 2024-08-01 PROCEDURE — 83695 ASSAY OF LIPOPROTEIN(A): CPT

## 2024-08-01 PROCEDURE — 84100 ASSAY OF PHOSPHORUS: CPT

## 2024-08-01 PROCEDURE — 86803 HEPATITIS C AB TEST: CPT

## 2024-08-01 PROCEDURE — 80053 COMPREHEN METABOLIC PANEL: CPT

## 2024-08-01 PROCEDURE — 82043 UR ALBUMIN QUANTITATIVE: CPT

## 2024-08-01 PROCEDURE — 83036 HEMOGLOBIN GLYCOSYLATED A1C: CPT

## 2024-08-01 PROCEDURE — 87389 HIV-1 AG W/HIV-1&-2 AB AG IA: CPT

## 2024-08-01 PROCEDURE — 85025 COMPLETE CBC W/AUTO DIFF WBC: CPT

## 2024-08-01 PROCEDURE — 36415 COLL VENOUS BLD VENIPUNCTURE: CPT

## 2024-08-01 PROCEDURE — 82570 ASSAY OF URINE CREATININE: CPT

## 2024-08-01 PROCEDURE — 80061 LIPID PANEL: CPT

## 2024-08-02 LAB — LPA SERPL-SCNC: 124.8 NMOL/L

## 2024-08-06 ENCOUNTER — OFFICE VISIT (OUTPATIENT)
Dept: NEPHROLOGY | Facility: CLINIC | Age: 59
End: 2024-08-06
Payer: COMMERCIAL

## 2024-08-06 VITALS
DIASTOLIC BLOOD PRESSURE: 70 MMHG | SYSTOLIC BLOOD PRESSURE: 132 MMHG | HEART RATE: 52 BPM | HEIGHT: 66 IN | BODY MASS INDEX: 30.7 KG/M2 | WEIGHT: 191 LBS

## 2024-08-06 DIAGNOSIS — M89.9 CHRONIC KIDNEY DISEASE-MINERAL AND BONE DISORDER: ICD-10-CM

## 2024-08-06 DIAGNOSIS — E83.9 CHRONIC KIDNEY DISEASE-MINERAL AND BONE DISORDER: ICD-10-CM

## 2024-08-06 DIAGNOSIS — I25.10 CORONARY ARTERY DISEASE INVOLVING NATIVE CORONARY ARTERY OF NATIVE HEART WITHOUT ANGINA PECTORIS: ICD-10-CM

## 2024-08-06 DIAGNOSIS — N18.9 CHRONIC KIDNEY DISEASE-MINERAL AND BONE DISORDER: ICD-10-CM

## 2024-08-06 DIAGNOSIS — I12.9 BENIGN HYPERTENSION WITH CKD (CHRONIC KIDNEY DISEASE) STAGE III (HCC): ICD-10-CM

## 2024-08-06 DIAGNOSIS — N18.30 BENIGN HYPERTENSION WITH CKD (CHRONIC KIDNEY DISEASE) STAGE III (HCC): ICD-10-CM

## 2024-08-06 DIAGNOSIS — N18.31 STAGE 3A CHRONIC KIDNEY DISEASE (HCC): Primary | ICD-10-CM

## 2024-08-06 PROCEDURE — 99214 OFFICE O/P EST MOD 30 MIN: CPT | Performed by: INTERNAL MEDICINE

## 2024-08-06 NOTE — PATIENT INSTRUCTIONS
You have chronic kidney disease (CKD) and your kidney function is stable.   I recommend no changes to your medications today.   Follow up in 12 months - please obtain blood work 1-2 weeks prior to the appointment.     Please check your BP twice daily for 1 week and bring a log with your next visit.   Please avoid taking NSAIDs (Ibuprofen, Motrin, Aleve, Advil, Naproxen, Celebrex, etc.)  Make sure you are eating healthy and have adequate exercise.

## 2024-08-06 NOTE — PROGRESS NOTES
NEPHROLOGY OFFICE PROGRESS NOTE   Adan Melendez 59 y.o. male MRN: [id number]  DATE: 08/06/24  Reason for visit: Continued evaluation and management of CKD    ASSESSMENT & PLAN:  Chronic kidney disease, stage IIIA:  Adan's baseline creatinine is 1.3 to 1.4 since 2012. No proteinuria.   Etiology for CKD is not clear - possibly arterio and arteriolosclerosis.   Stable renal function - SCr 1.27  Treatment: BP control, lipid control, avoidance of nephrotoxics.     Hypertension:  BP is at goal (<130/80)  Rx: Metoprolol succ 12.5 mg OD, Losartan 12.5 mg OD.   No changes.     Coronary artery disease with ICM  10/2023 Echo: EF 45% - improved.   Follow up with cardiology.      Mineral and bone disease:  PTH is at goal in Feb 2024.   Ca and phos are at goal.   Vitamin D is at goal in Aug 2024. Not taking Vitamin D.     Patient Instructions   You have chronic kidney disease (CKD) and your kidney function is stable.   I recommend no changes to your medications today.   Follow up in 12 months - please obtain blood work 1-2 weeks prior to the appointment.     Please check your BP twice daily for 1 week and bring a log with your next visit.   Please avoid taking NSAIDs (Ibuprofen, Motrin, Aleve, Advil, Naproxen, Celebrex, etc.)  Make sure you are eating healthy and have adequate exercise.       SUBJECTIVE / INTERVAL HISTORY:  Adan was last seen in the office back in Feb 2024.   There have been no recent hospitalizations or ER visits over the past 6 months.  Home BP average is 124/80 from Feb to Aug 2024 based on his phone edwin.   No new medical issues.   He is not taking the Evolocumab.     PMH/PSH: HTN, HLP, CKD, CAD s/p STEMI, ICM, GERD, nephrolithiasis, DDD    Previous work up:   4/22/22 CT abd: stable 3 mm right renal calculus, bilateral simple renal cysts.     3/9/23 Renal US: R 11.4 cm, L 10.9 cm, no hydronephrosis, simple cyst on the R. PVR 21 cc.     ALLERGIES:   Allergies   Allergen Reactions    Turmeric - Food  "Allergy Anaphylaxis    Niacin Other (See Comments)     Other reaction(s): NIACIN (NIACIN) (facial flushing)    Fenofibrate Dizziness     Other reaction(s): dizziness     REVIEW OF SYSTEMS:  Review of Systems   Constitutional:  Negative for appetite change, chills, fatigue and fever.   Respiratory:  Negative for cough and shortness of breath.    Cardiovascular:  Negative for chest pain and leg swelling.   Gastrointestinal:  Negative for abdominal pain, diarrhea, nausea and vomiting.   Genitourinary:  Negative for dysuria and hematuria.   Musculoskeletal:  Negative for arthralgias and back pain.   Allergic/Immunologic: Negative for immunocompromised state.   Neurological:  Negative for dizziness and light-headedness.     OBJECTIVE:  /70 (BP Location: Left arm, Patient Position: Sitting, Cuff Size: Large)   Pulse (!) 52   Ht 5' 6\" (1.676 m)   Wt 86.6 kg (191 lb)   BMI 30.83 kg/m²   Current Weight:   Body mass index is 30.83 kg/m².  Physical Exam  Constitutional:       General: He is not in acute distress.     Appearance: Normal appearance. He is well-developed. He is not ill-appearing, toxic-appearing or diaphoretic.   HENT:      Head: Normocephalic and atraumatic.   Eyes:      General: No scleral icterus.     Conjunctiva/sclera: Conjunctivae normal.   Neck:      Vascular: No JVD.   Cardiovascular:      Rate and Rhythm: Normal rate and regular rhythm.      Heart sounds: Normal heart sounds.   Pulmonary:      Effort: Pulmonary effort is normal. No respiratory distress.      Breath sounds: Normal breath sounds.   Abdominal:      General: Bowel sounds are normal.      Palpations: Abdomen is soft.   Musculoskeletal:      Cervical back: Neck supple.      Right lower leg: No edema.      Left lower leg: No edema.   Skin:     General: Skin is warm and dry.   Neurological:      Mental Status: He is alert and oriented to person, place, and time.   Psychiatric:         Mood and Affect: Mood normal.         Behavior: " Behavior normal.         Judgment: Judgment normal.       Medications:  Current Outpatient Medications:     aspirin (ECOTRIN LOW STRENGTH) 81 mg EC tablet, Take 1 tablet (81 mg total) by mouth daily, Disp: 90 tablet, Rfl: 3    atorvastatin (LIPITOR) 80 mg tablet, Take 1 tablet (80 mg total) by mouth daily at bedtime, Disp: 90 tablet, Rfl: 3    Cholecalciferol (Vitamin D) 50 MCG (2000 UT) CAPS, Take 2,000 Units by mouth daily in the early morning, Disp: , Rfl:     Evolocumab 140 MG/ML SOAJ, Inject 1 mL (140 mg total) under the skin every 14 (fourteen) days, Disp: 6 mL, Rfl: 3    losartan (COZAAR) 25 mg tablet, Take 0.5 tablets (12.5 mg total) by mouth daily, Disp: 90 tablet, Rfl: 3    metoprolol succinate (TOPROL-XL) 25 mg 24 hr tablet, Take 0.5 tablets (12.5 mg total) by mouth daily, Disp: 45 tablet, Rfl: 3    ticagrelor (BRILINTA) 90 MG, Take 1 tablet (90 mg total) by mouth every 12 (twelve) hours, Disp: 180 tablet, Rfl: 3    Laboratory Results:  Results for orders placed or performed in visit on 08/01/24   Hemoglobin A1C   Result Value Ref Range    Hemoglobin A1C 5.8 (H) Normal 4.0-5.6%; PreDiabetic 5.7-6.4%; Diabetic >=6.5%; Glycemic control for adults with diabetes <7.0% %     mg/dl   Vitamin D 25 hydroxy   Result Value Ref Range    Vit D, 25-Hydroxy 42.7 30.0 - 100.0 ng/mL   CBC and differential   Result Value Ref Range    WBC 5.18 4.31 - 10.16 Thousand/uL    RBC 5.14 3.88 - 5.62 Million/uL    Hemoglobin 15.8 12.0 - 17.0 g/dL    Hematocrit 47.0 36.5 - 49.3 %    MCV 91 82 - 98 fL    MCH 30.7 26.8 - 34.3 pg    MCHC 33.6 31.4 - 37.4 g/dL    RDW 12.1 11.6 - 15.1 %    MPV 11.3 8.9 - 12.7 fL    Platelets 182 149 - 390 Thousands/uL    nRBC 0 /100 WBCs    Segmented % 63 43 - 75 %    Immature Grans % 0 0 - 2 %    Lymphocytes % 24 14 - 44 %    Monocytes % 10 4 - 12 %    Eosinophils Relative 2 0 - 6 %    Basophils Relative 1 0 - 1 %    Absolute Neutrophils 3.27 1.85 - 7.62 Thousands/µL    Absolute Immature Grans  0.01 0.00 - 0.20 Thousand/uL    Absolute Lymphocytes 1.25 0.60 - 4.47 Thousands/µL    Absolute Monocytes 0.49 0.17 - 1.22 Thousand/µL    Eosinophils Absolute 0.12 0.00 - 0.61 Thousand/µL    Basophils Absolute 0.04 0.00 - 0.10 Thousands/µL   Comprehensive metabolic panel   Result Value Ref Range    Sodium 143 135 - 147 mmol/L    Potassium 4.8 3.5 - 5.3 mmol/L    Chloride 106 96 - 108 mmol/L    CO2 30 21 - 32 mmol/L    ANION GAP 7 4 - 13 mmol/L    BUN 24 5 - 25 mg/dL    Creatinine 1.27 0.60 - 1.30 mg/dL    Glucose, Fasting 110 (H) 65 - 99 mg/dL    Calcium 9.2 8.4 - 10.2 mg/dL    AST 22 13 - 39 U/L    ALT 25 7 - 52 U/L    Alkaline Phosphatase 83 34 - 104 U/L    Total Protein 6.8 6.4 - 8.4 g/dL    Albumin 4.1 3.5 - 5.0 g/dL    Total Bilirubin 0.76 0.20 - 1.00 mg/dL    eGFR 61 ml/min/1.73sq m   Hepatitis C Antibody   Result Value Ref Range    Hepatitis C Ab Non-reactive Non-Reactive   HIV 1/2 AG/AB w Reflex [facility] for 2 yr old and above   Result Value Ref Range    HIV-1 p24 Antigen Non-Reactive Non-Reactive    HIV-1 Antibody Non-Reactive Non-Reactive    HIV-2 Antibody Non-Reactive Non-Reactive    HIV Ag-Ab 5th Gen Non-Reactive Non-Reactive   Lipoprotein A (LPA)   Result Value Ref Range    Lipoprotein (a) 124.8 (H) <75.0 nmol/L   Lipid Panel with Direct LDL reflex   Result Value Ref Range    Cholesterol 126 See Comment mg/dL    Triglycerides 120 See Comment mg/dL    HDL, Direct 37 (L) >=40 mg/dL    LDL Calculated 65 0 - 100 mg/dL

## 2024-08-27 DIAGNOSIS — I25.5 ISCHEMIC CARDIOMYOPATHY: ICD-10-CM

## 2024-08-27 DIAGNOSIS — I25.10 CORONARY ARTERY DISEASE INVOLVING NATIVE CORONARY ARTERY OF NATIVE HEART WITHOUT ANGINA PECTORIS: ICD-10-CM

## 2024-08-27 RX ORDER — LOSARTAN POTASSIUM 25 MG/1
25 TABLET ORAL DAILY
Qty: 14 TABLET | Refills: 0 | Status: SHIPPED | OUTPATIENT
Start: 2024-08-27

## 2024-08-27 RX ORDER — LOSARTAN POTASSIUM 25 MG/1
12.5 TABLET ORAL DAILY
Qty: 90 TABLET | Refills: 1 | Status: SHIPPED | OUTPATIENT
Start: 2024-08-27

## 2024-08-27 NOTE — TELEPHONE ENCOUNTER
Patient spouse called in stating that she would a 2 week supply for Losartan 25 mfg tablet  to be sent to [facility] in [place], [place] until Patient 90 day supply arrives.

## 2024-09-28 ENCOUNTER — TELEPHONE (OUTPATIENT)
Dept: PLASTIC SURGERY | Facility: CLINIC | Age: 59
End: 2024-09-28

## 2024-10-08 ENCOUNTER — TELEPHONE (OUTPATIENT)
Dept: PLASTIC SURGERY | Facility: CLINIC | Age: 59
End: 2024-10-08

## 2024-10-08 DIAGNOSIS — L98.9 SKIN LESION OF CHEST WALL: Primary | ICD-10-CM

## 2024-10-08 DIAGNOSIS — L98.9 CHEST SKIN LESION: ICD-10-CM

## 2024-10-14 NOTE — PROGRESS NOTES
Exercise session detail Billing Type: Third-Party Bill Bill For Surgical Tray: no Expected Date Of Service: 10/14/2024 Performing Laboratory: 0

## 2024-10-21 ENCOUNTER — TELEPHONE (OUTPATIENT)
Dept: PLASTIC SURGERY | Facility: CLINIC | Age: 59
End: 2024-10-21

## 2024-11-07 ENCOUNTER — OFFICE VISIT (OUTPATIENT)
Dept: CARDIOLOGY CLINIC | Facility: CLINIC | Age: 59
End: 2024-11-07
Payer: COMMERCIAL

## 2024-11-07 VITALS
HEIGHT: 66 IN | WEIGHT: 191 LBS | SYSTOLIC BLOOD PRESSURE: 104 MMHG | DIASTOLIC BLOOD PRESSURE: 74 MMHG | BODY MASS INDEX: 30.7 KG/M2 | HEART RATE: 53 BPM

## 2024-11-07 DIAGNOSIS — E78.2 MIXED HYPERLIPIDEMIA: ICD-10-CM

## 2024-11-07 DIAGNOSIS — I25.10 CORONARY ARTERY DISEASE INVOLVING NATIVE CORONARY ARTERY OF NATIVE HEART WITHOUT ANGINA PECTORIS: ICD-10-CM

## 2024-11-07 DIAGNOSIS — I25.5 ISCHEMIC CARDIOMYOPATHY: Primary | ICD-10-CM

## 2024-11-07 DIAGNOSIS — I21.3 ST ELEVATION MYOCARDIAL INFARCTION (STEMI), UNSPECIFIED ARTERY (HCC): ICD-10-CM

## 2024-11-07 DIAGNOSIS — L98.9 SKIN LESION OF CHEST WALL: ICD-10-CM

## 2024-11-07 PROCEDURE — 99214 OFFICE O/P EST MOD 30 MIN: CPT | Performed by: INTERNAL MEDICINE

## 2024-11-07 PROCEDURE — 93000 ELECTROCARDIOGRAM COMPLETE: CPT | Performed by: INTERNAL MEDICINE

## 2024-11-07 RX ORDER — EVOLOCUMAB 140 MG/ML
1 INJECTION, SOLUTION SUBCUTANEOUS
Qty: 6 ML | Refills: 3 | Status: SHIPPED | OUTPATIENT
Start: 2024-11-07

## 2024-11-07 NOTE — PROGRESS NOTES
Cardiology Outpatient Follow-Up Note - Adan Melendez 59 y.o. male MRN: [id number]    Assessment/Plan:    1. Coronary artery disease involving native coronary artery of native heart without angina pectoris  No angina.  STEMI June 2023. Continue aspirin 81 mg daily.  Continue metoprolol XL 12.5 mg daily  Continue atorvastatin 80 mg daily -- LDL above goal. Goal LDL < 55 mg/dL for secondary prevention.   Add Repatha 140 mg SC q14d.  - not previously started        2. Ischemic cardiomyopathy  Very well compensated without clinical CHF.  Continue BB and ARB.     3. Mixed hyperlipidemia  As above, add PCSK9i for goal LDL < 55 m/dL.       4. ST elevation myocardial infarction (STEMI), unspecified artery (HCC)      We will see Adan Melendez back in 12 months for routine follow-up.    Subjective:     HPI: Adan Melendez is a 59 y.o. year old male with CAD (STEMI June 2023 s/p PABLO to pLAD), ICMP with LVEF 45%, CKD, HLD, pre-DM presenting for follow-up.     No new complaints  Never started Repatha    Cardiac Testing:    Echo 10/3/23   Interpretation Summary         Left Ventricle: Left ventricular cavity size is normal. Wall thickness is normal. The left ventricular ejection fraction is 45% by biplane measurement.. Systolic function is mildly reduced. Global longitudinal strain is reduced at -11% with marked regional reduction in the anteroseptum, anterior wall and apex. Diastolic function is mildly abnormal, consistent with grade I (abnormal) relaxation.  Left atrial filling pressure is normal.    The following segments are akinetic: mid anteroseptal, apical anterior, apical septal, apical lateral and apex.    All other segments are normal.    Right Ventricle: Right ventricular cavity size is normal. Systolic function is normal.    Tricuspid Valve: There is mild regurgitation. The tricuspid valve regurgitation jet is eccentric.     Strain was performed to quantify interventricular dyssynchrony and evaluate components  "of myocardial function due to CAD, MI. ICM . Results from the utilization of Strain Analysis are listed in the report below.      Cardiac Cath 06/18/2023   Impression         Prox LAD lesion is 100% stenosed.     Single-vessel coronary disease with a total occlusion of the proximal LAD representing the culprit for the patient's anterior STEMI.  Successful IVUS-guided PCI, proximal LAD, with a reduction in stenosis from 100% to 0% following pre-dilation and placement of a 4.0x28mm drug-eluting stent.  Mildly elevated LVEDP (20 mmHg).  Plan: DAPT, statin, beta-blocker, echocardiogram, guideline-directed medical therapy if LVEDP is significantly reduced, cardiac rehabilitation.             EKGs, personally reviewed:  11/7/24 - sinus bradycardia, 53 bpm, anteroseptal infarct, (old), abnormal study    Relevant Labs & Results:  Lipid panel 06/19/23 -- , , HDL 33, LDL 85 mg/dL   Lipid panel 12/12/23 -- , , HDL 35,  mg/dL   CMP 8/1/24 - Cr 1.27, K 4.8  Lipid panel 8/1/24 - , , HDL 37, LDL 65  Lp(a) 8/1/24 - 124 nmol/L    ROS:  Review of Systems:  Review of Systems    Objective:     Vitals:   Vitals:    11/07/24 1333   BP: 104/74   BP Location: Left arm   Patient Position: Sitting   Cuff Size: Standard   Pulse: (!) 53   Weight: 86.6 kg (191 lb)   Height: 5' 6\" (1.676 m)    Body surface area is 1.96 meters squared.  Wt Readings from Last 3 Encounters:   11/07/24 86.6 kg (191 lb)   08/06/24 86.6 kg (191 lb)   05/13/24 84.4 kg (186 lb)       Physical Exam:    General: Adan Melendez is a well appearing male, in no acute distress, sitting comfortably  HEENT: moist mucous membranes, EOMI  Neck:  No JVD, supple, trachea midline  Cardiovascular: unremarkable S1/S2, regular rate and rhythm, no murmurs, rubs or gallops  Pulmonary: normal respiratory effort, CTAB  Abdomen: soft and nondistended  Extremities: No lower extremity edema. Warm and well perfused extremities.  Neuro: no focal " "motor deficits, AAOx3 (person, place, time)  Psych: Normal mood and affect, cooperative          Medications (at the START of this encounter):  Outpatient Medications Prior to Visit   Medication Sig Dispense Refill    aspirin (ECOTRIN LOW STRENGTH) 81 mg EC tablet Take 1 tablet (81 mg total) by mouth daily 90 tablet 3    atorvastatin (LIPITOR) 80 mg tablet Take 1 tablet (80 mg total) by mouth daily at bedtime 90 tablet 3    Cholecalciferol (Vitamin D) 50 MCG (2000 UT) CAPS Take 2,000 Units by mouth daily in the early morning      Evolocumab 140 MG/ML SOAJ Inject 1 mL (140 mg total) under the skin every 14 (fourteen) days (Patient not taking: Reported on 8/6/2024) 6 mL 3    losartan (COZAAR) 25 mg tablet Take 0.5 tablets (12.5 mg total) by mouth daily 90 tablet 1    losartan (COZAAR) 25 mg tablet Take 1 tablet (25 mg total) by mouth daily 14 tablet 0    metoprolol succinate (TOPROL-XL) 25 mg 24 hr tablet Take 0.5 tablets (12.5 mg total) by mouth daily 45 tablet 3    ticagrelor (BRILINTA) 90 MG Take 1 tablet (90 mg total) by mouth every 12 (twelve) hours (Patient not taking: Reported on 8/6/2024) 180 tablet 3     No facility-administered medications prior to visit.             This note was completed in part utilizing Dragon Medical One voice recognition software. Grammatical errors, random word insertion, spelling mistakes, occasional wrong word or \"sound-alike\" substitutions and incomplete sentences may be an occasional consequence of the system secondary to software limitations, ambient noise and hardware issues. At the time of dictation, efforts were made to edit, clarify and /or correct errors.  Please read the chart carefully and recognize, using context, where substitutions have occurred.  If you have any questions or concerns about the context, text or information contained within the body of this dictation, please contact myself, the provider, for further clarification.    "

## 2024-11-07 NOTE — PATIENT INSTRUCTIONS
You have an elevated Lipoprotein(a)  I recommend your children have their cholesterol and Lipoprotein(a) checked as well

## 2025-01-29 ENCOUNTER — TELEPHONE (OUTPATIENT)
Dept: CARDIOLOGY CLINIC | Facility: CLINIC | Age: 60
End: 2025-01-29

## 2025-03-12 ENCOUNTER — TELEPHONE (OUTPATIENT)
Dept: CARDIOLOGY CLINIC | Facility: CLINIC | Age: 60
End: 2025-03-12

## 2025-03-12 NOTE — TELEPHONE ENCOUNTER
[facility] (1:32 pm) to Reschedule 11/28/2025 with Dr Joyce.  Dr Joyce will not be in the office on this date

## 2025-03-21 ENCOUNTER — TELEPHONE (OUTPATIENT)
Dept: CARDIOLOGY CLINIC | Facility: CLINIC | Age: 60
End: 2025-03-21

## 2025-03-31 ENCOUNTER — TELEPHONE (OUTPATIENT)
Dept: CARDIOLOGY CLINIC | Facility: CLINIC | Age: 60
End: 2025-03-31

## 2025-03-31 NOTE — TELEPHONE ENCOUNTER
[facility] (2:56 pm) to call and reschedule 11/28/2025 with Dr Joyce.  Dr Joyce is not in the office on this date

## 2025-07-21 DIAGNOSIS — I21.3 ST ELEVATION MYOCARDIAL INFARCTION (STEMI), UNSPECIFIED ARTERY (HCC): ICD-10-CM

## 2025-07-22 RX ORDER — METOPROLOL SUCCINATE 25 MG/1
12.5 TABLET, EXTENDED RELEASE ORAL
Qty: 45 TABLET | Refills: 1 | Status: SHIPPED | OUTPATIENT
Start: 2025-07-22

## 2025-07-30 DIAGNOSIS — I21.3 ST ELEVATION MYOCARDIAL INFARCTION (STEMI), UNSPECIFIED ARTERY (HCC): ICD-10-CM

## 2025-07-31 RX ORDER — ATORVASTATIN CALCIUM 80 MG/1
80 TABLET, FILM COATED ORAL
Qty: 90 TABLET | Refills: 0 | Status: SHIPPED | OUTPATIENT
Start: 2025-07-31

## (undated) DEVICE — TREK CORONARY DILATATION CATHETER 2.50 MM X 15 MM / RAPID-EXCHANGE: Brand: TREK

## (undated) DEVICE — THE VASC BAND HEMOSTAT IS A COMPRESSION DEVICE TO ASSIST HEMOSTASIS OF ARTERIAL, VENOUS AND HEMODIALYSIS PERCUTANEOUS ACCESS SITES.: Brand: VASC BAND™ HEMOSTAT

## (undated) DEVICE — NC TREK NEO™ CORONARY DILATATION CATHETER 4.00 MM X 20 MM / RAPID-EXCHANGE: Brand: NC TREK NEO™

## (undated) DEVICE — RUNTHROUGH NS EXTRA FLOPPY PTCA GUIDEWIRE: Brand: RUNTHROUGH

## (undated) DEVICE — CATH GUIDE LAUNCHER 6FR EBU 3.5

## (undated) DEVICE — RADIFOCUS OPTITORQUE ANGIOGRAPHIC CATHETER: Brand: OPTITORQUE

## (undated) DEVICE — Device: Brand: PROWATER

## (undated) DEVICE — GLIDESHEATH BASIC HYDROPHILIC COATED INTRODUCER SHEATH: Brand: GLIDESHEATH

## (undated) DEVICE — CATH DIAG 5FR IMPULSE 110CM 6S PIG 145 ANG

## (undated) DEVICE — Device: Brand: ASAHI SILVERWAY

## (undated) DEVICE — CATH IVUS EAGLE EYE PLATINUM 5FR 150CM

## (undated) DEVICE — DGW .035 FC J3MM 260CM TEF: Brand: EMERALD